# Patient Record
Sex: FEMALE | Race: WHITE | NOT HISPANIC OR LATINO | Employment: FULL TIME | URBAN - METROPOLITAN AREA
[De-identification: names, ages, dates, MRNs, and addresses within clinical notes are randomized per-mention and may not be internally consistent; named-entity substitution may affect disease eponyms.]

---

## 2017-02-19 ENCOUNTER — OFFICE VISIT - HEALTHEAST (OUTPATIENT)
Dept: FAMILY MEDICINE | Facility: CLINIC | Age: 37
End: 2017-02-19

## 2017-02-19 DIAGNOSIS — Z20.818 EXPOSURE TO STREP THROAT: ICD-10-CM

## 2017-03-17 ENCOUNTER — COMMUNICATION - HEALTHEAST (OUTPATIENT)
Dept: FAMILY MEDICINE | Facility: CLINIC | Age: 37
End: 2017-03-17

## 2017-03-30 ENCOUNTER — OFFICE VISIT - HEALTHEAST (OUTPATIENT)
Dept: FAMILY MEDICINE | Facility: CLINIC | Age: 37
End: 2017-03-30

## 2017-03-30 ENCOUNTER — AMBULATORY - HEALTHEAST (OUTPATIENT)
Dept: LAB | Facility: CLINIC | Age: 37
End: 2017-03-30

## 2017-03-30 DIAGNOSIS — Z13.220 SCREENING, LIPID: ICD-10-CM

## 2017-03-30 DIAGNOSIS — E78.5 HYPERLIPIDEMIA, UNSPECIFIED HYPERLIPIDEMIA TYPE: ICD-10-CM

## 2017-03-30 DIAGNOSIS — Z13.29 SCREENING FOR ENDOCRINE, NUTRITIONAL, METABOLIC AND IMMUNITY DISORDER: ICD-10-CM

## 2017-03-30 DIAGNOSIS — Z13.21 SCREENING FOR ENDOCRINE, NUTRITIONAL, METABOLIC AND IMMUNITY DISORDER: ICD-10-CM

## 2017-03-30 DIAGNOSIS — Z13.228 SCREENING FOR METABOLIC DISORDER: ICD-10-CM

## 2017-03-30 DIAGNOSIS — G89.29 OTHER CHRONIC PAIN: ICD-10-CM

## 2017-03-30 DIAGNOSIS — Z13.0 SCREENING FOR ENDOCRINE, NUTRITIONAL, METABOLIC AND IMMUNITY DISORDER: ICD-10-CM

## 2017-03-30 DIAGNOSIS — Z13.1 SCREENING FOR DIABETES MELLITUS: ICD-10-CM

## 2017-03-30 DIAGNOSIS — R63.5 WEIGHT GAIN: ICD-10-CM

## 2017-03-30 DIAGNOSIS — Z13.29 SCREENING FOR THYROID DISORDER: ICD-10-CM

## 2017-03-30 DIAGNOSIS — Z13.0 SCREENING, ANEMIA, DEFICIENCY, IRON: ICD-10-CM

## 2017-03-30 DIAGNOSIS — Z13.228 SCREENING FOR ENDOCRINE, NUTRITIONAL, METABOLIC AND IMMUNITY DISORDER: ICD-10-CM

## 2017-03-30 DIAGNOSIS — E55.9 VITAMIN D DEFICIENCY DISEASE: ICD-10-CM

## 2017-03-30 LAB
CHOLEST SERPL-MCNC: 216 MG/DL
FASTING STATUS PATIENT QL REPORTED: YES
HBA1C MFR BLD: 5.4 % (ref 3.5–6)
HDLC SERPL-MCNC: 63 MG/DL
LDLC SERPL CALC-MCNC: 137 MG/DL
TRIGL SERPL-MCNC: 82 MG/DL

## 2017-03-30 ASSESSMENT — MIFFLIN-ST. JEOR: SCORE: 1820.75

## 2017-03-30 NOTE — ASSESSMENT & PLAN NOTE
Diffuse muscle aches.  Weight reduction recommended.  She  is participating in the Weight Loss Program at [facility].

## 2017-03-30 NOTE — ASSESSMENT & PLAN NOTE
Vitamin d level is low. It is 22.6 but I would like to see it closer to 60.  I recommend you start taking (or increase current intake of) over the counter vitamin D3, 2000 IU daily.  We can recheck your vitamin D level in 3months or at your next visit.

## 2017-03-30 NOTE — ASSESSMENT & PLAN NOTE
Weight reduction recommended.  She  is participating in the Weight Loss Program at [facility].

## 2017-03-30 NOTE — ASSESSMENT & PLAN NOTE
Weight reduction recommended.  She  is participating in the Weight Loss Program at [facility].

## 2017-04-26 ENCOUNTER — OFFICE VISIT - HEALTHEAST (OUTPATIENT)
Dept: FAMILY MEDICINE | Facility: CLINIC | Age: 37
End: 2017-04-26

## 2017-04-26 DIAGNOSIS — Z20.818 EXPOSURE TO STREP THROAT: ICD-10-CM

## 2017-04-27 ENCOUNTER — COMMUNICATION - HEALTHEAST (OUTPATIENT)
Dept: FAMILY MEDICINE | Facility: CLINIC | Age: 37
End: 2017-04-27

## 2017-05-02 ENCOUNTER — OFFICE VISIT - HEALTHEAST (OUTPATIENT)
Dept: FAMILY MEDICINE | Facility: CLINIC | Age: 37
End: 2017-05-02

## 2017-05-02 DIAGNOSIS — E78.5 HYPERLIPIDEMIA: ICD-10-CM

## 2017-06-01 ENCOUNTER — OFFICE VISIT - HEALTHEAST (OUTPATIENT)
Dept: FAMILY MEDICINE | Facility: CLINIC | Age: 37
End: 2017-06-01

## 2017-06-01 DIAGNOSIS — E78.5 HYPERLIPIDEMIA: ICD-10-CM

## 2017-06-01 DIAGNOSIS — G89.29 CHRONIC PAIN: ICD-10-CM

## 2017-06-01 ASSESSMENT — MIFFLIN-ST. JEOR: SCORE: 1686.94

## 2017-07-12 ENCOUNTER — OFFICE VISIT - HEALTHEAST (OUTPATIENT)
Dept: FAMILY MEDICINE | Facility: CLINIC | Age: 37
End: 2017-07-12

## 2017-07-12 DIAGNOSIS — E78.5 HYPERLIPIDEMIA: ICD-10-CM

## 2017-07-12 DIAGNOSIS — G89.29 CHRONIC PAIN: ICD-10-CM

## 2017-07-12 ASSESSMENT — MIFFLIN-ST. JEOR: SCORE: 1635.91

## 2017-07-16 ENCOUNTER — OFFICE VISIT - HEALTHEAST (OUTPATIENT)
Dept: FAMILY MEDICINE | Facility: CLINIC | Age: 37
End: 2017-07-16

## 2017-07-16 DIAGNOSIS — R07.0 THROAT PAIN: ICD-10-CM

## 2017-08-22 ENCOUNTER — OFFICE VISIT - HEALTHEAST (OUTPATIENT)
Dept: FAMILY MEDICINE | Facility: CLINIC | Age: 37
End: 2017-08-22

## 2017-08-22 DIAGNOSIS — E78.5 HYPERLIPIDEMIA: ICD-10-CM

## 2017-08-22 DIAGNOSIS — G89.29 CHRONIC PAIN: ICD-10-CM

## 2017-09-26 ENCOUNTER — COMMUNICATION - HEALTHEAST (OUTPATIENT)
Dept: FAMILY MEDICINE | Facility: CLINIC | Age: 37
End: 2017-09-26

## 2017-10-02 ENCOUNTER — COMMUNICATION - HEALTHEAST (OUTPATIENT)
Dept: FAMILY MEDICINE | Facility: CLINIC | Age: 37
End: 2017-10-02

## 2017-10-12 ENCOUNTER — COMMUNICATION - HEALTHEAST (OUTPATIENT)
Dept: SCHEDULING | Facility: CLINIC | Age: 37
End: 2017-10-12

## 2017-11-07 ENCOUNTER — OFFICE VISIT - HEALTHEAST (OUTPATIENT)
Dept: FAMILY MEDICINE | Facility: CLINIC | Age: 37
End: 2017-11-07

## 2017-11-07 DIAGNOSIS — E78.5 HYPERLIPIDEMIA, UNSPECIFIED HYPERLIPIDEMIA TYPE: ICD-10-CM

## 2017-11-07 DIAGNOSIS — E55.9 VITAMIN D DEFICIENCY DISEASE: ICD-10-CM

## 2017-11-07 LAB
CHOLEST SERPL-MCNC: 224 MG/DL
FASTING STATUS PATIENT QL REPORTED: ABNORMAL
HDLC SERPL-MCNC: 85 MG/DL
LDLC SERPL CALC-MCNC: 129 MG/DL
TRIGL SERPL-MCNC: 50 MG/DL

## 2017-11-07 ASSESSMENT — MIFFLIN-ST. JEOR: SCORE: 1520.24

## 2017-11-07 NOTE — ASSESSMENT & PLAN NOTE
Lab Results   Component Value Date    CHOL 216 (H) 03/30/2017     Lab Results   Component Value Date    HDL  03/30/2017     Lab Results   Component Value Date    LDLCALC 137 (H) 03/30/2017     Lab Results   Component Value Date    TRIG 82 03/30/2017     Repeat lipids ordered today.    Addendum:  mychart result note sent with lipid results:  Overall these are improved.   triglicerides are better, hdl cholesterol is better and ldl cholesterol is better!      The total cholesterol is actually improved as well when considering your excellent HDL number (which is falsely elevating the total cholesterol)    These are excellent numbers.      All your hard work losing weight is paying off, keep upthe great work.

## 2017-11-07 NOTE — ASSESSMENT & PLAN NOTE
Vitamin D, Total (25-Hydroxy)   Date Value RefRange Status   03/30/2017 22.6 (L) 30.0 - 80.0 ng/mL Final      Will recheck today. Order placed.     Addendum:   Vitamin D, Total (25-Hydroxy)   Date Value Ref Range Status   11/07/2017 37.5 30.0 - 80.0 ng/mL Final

## 2017-12-08 ENCOUNTER — TRANSFERRED RECORDS (OUTPATIENT)
Dept: HEALTH INFORMATION MANAGEMENT | Facility: CLINIC | Age: 37
End: 2017-12-08

## 2017-12-12 ENCOUNTER — OFFICE VISIT - HEALTHEAST (OUTPATIENT)
Dept: FAMILY MEDICINE | Facility: CLINIC | Age: 37
End: 2017-12-12

## 2017-12-12 DIAGNOSIS — E55.9 VITAMIN D DEFICIENCY DISEASE: ICD-10-CM

## 2017-12-12 DIAGNOSIS — E78.5 HYPERLIPIDEMIA, UNSPECIFIED HYPERLIPIDEMIA TYPE: ICD-10-CM

## 2017-12-12 NOTE — ASSESSMENT & PLAN NOTE
We discussed that her vitamin D level has improved but that she could probably take up in a little more vitamin D daily and that would be beneficial.

## 2017-12-12 NOTE — ASSESSMENT & PLAN NOTE
She continues to lose weight.  We will continue the phentermine for now.    Initial Weight: 253.5 lbs  Weight: 185 lb 8 oz (84.1 kg)  Weight loss from initial: 68  % Weight loss: 26.82 %

## 2017-12-29 ENCOUNTER — RECORDS - HEALTHEAST (OUTPATIENT)
Dept: ADMINISTRATIVE | Facility: OTHER | Age: 37
End: 2017-12-29

## 2017-12-29 ENCOUNTER — HOSPITAL ENCOUNTER (OUTPATIENT)
Dept: PHYSICAL MEDICINE AND REHAB | Facility: CLINIC | Age: 37
Discharge: HOME | End: 2017-12-29
Attending: PHYSICAL MEDICINE & REHABILITATION

## 2017-12-29 DIAGNOSIS — M54.6 THORACIC SPINE PAIN: ICD-10-CM

## 2017-12-29 DIAGNOSIS — G56.03 BILATERAL CARPAL TUNNEL SYNDROME: ICD-10-CM

## 2017-12-29 DIAGNOSIS — M54.50 CHRONIC LOW BACK PAIN WITHOUT SCIATICA: ICD-10-CM

## 2017-12-29 DIAGNOSIS — R20.0 BILATERAL HAND NUMBNESS: ICD-10-CM

## 2017-12-29 DIAGNOSIS — M54.2 NECK PAIN: ICD-10-CM

## 2017-12-29 DIAGNOSIS — G89.29 CHRONIC LOW BACK PAIN WITHOUT SCIATICA: ICD-10-CM

## 2017-12-29 DIAGNOSIS — M79.18 MYOFASCIAL PAIN: ICD-10-CM

## 2017-12-29 DIAGNOSIS — M47.816 LUMBAR FACET JOINT SYNDROME: ICD-10-CM

## 2017-12-29 ASSESSMENT — MIFFLIN-ST. JEOR: SCORE: 1509.81

## 2018-01-08 ENCOUNTER — OFFICE VISIT - HEALTHEAST (OUTPATIENT)
Dept: PHYSICAL THERAPY | Facility: REHABILITATION | Age: 38
End: 2018-01-08

## 2018-01-08 DIAGNOSIS — G56.12 MEDIAN NERVE DYSFUNCTION, LEFT: ICD-10-CM

## 2018-01-08 DIAGNOSIS — M54.2 CERVICALGIA: ICD-10-CM

## 2018-01-08 DIAGNOSIS — G56.11 MEDIAN NERVE DYSFUNCTION, RIGHT: ICD-10-CM

## 2018-01-08 DIAGNOSIS — M25.639 DECREASED RANGE OF MOTION OF WRIST: ICD-10-CM

## 2018-01-08 DIAGNOSIS — R29.3 POOR POSTURE: ICD-10-CM

## 2018-01-12 ENCOUNTER — RECORDS - HEALTHEAST (OUTPATIENT)
Dept: RADIOLOGY | Facility: CLINIC | Age: 38
End: 2018-01-12

## 2018-01-15 ENCOUNTER — OFFICE VISIT - HEALTHEAST (OUTPATIENT)
Dept: PHYSICAL THERAPY | Facility: REHABILITATION | Age: 38
End: 2018-01-15

## 2018-01-15 DIAGNOSIS — G56.11 MEDIAN NERVE DYSFUNCTION, RIGHT: ICD-10-CM

## 2018-01-15 DIAGNOSIS — R29.3 POOR POSTURE: ICD-10-CM

## 2018-01-15 DIAGNOSIS — M25.639 DECREASED RANGE OF MOTION OF WRIST: ICD-10-CM

## 2018-01-15 DIAGNOSIS — G56.12 MEDIAN NERVE DYSFUNCTION, LEFT: ICD-10-CM

## 2018-01-15 DIAGNOSIS — M54.2 CERVICALGIA: ICD-10-CM

## 2018-01-17 ENCOUNTER — COMMUNICATION - HEALTHEAST (OUTPATIENT)
Dept: PHYSICAL MEDICINE AND REHAB | Facility: CLINIC | Age: 38
End: 2018-01-17

## 2018-01-19 ENCOUNTER — AMBULATORY - HEALTHEAST (OUTPATIENT)
Dept: NEUROSURGERY | Facility: CLINIC | Age: 38
End: 2018-01-19

## 2018-01-19 DIAGNOSIS — G93.5 CHIARI I MALFORMATION (H): ICD-10-CM

## 2018-01-24 ENCOUNTER — OFFICE VISIT - HEALTHEAST (OUTPATIENT)
Dept: FAMILY MEDICINE | Facility: CLINIC | Age: 38
End: 2018-01-24

## 2018-01-24 ENCOUNTER — AMBULATORY - HEALTHEAST (OUTPATIENT)
Dept: NEUROSURGERY | Facility: CLINIC | Age: 38
End: 2018-01-24

## 2018-01-24 ENCOUNTER — COMMUNICATION - HEALTHEAST (OUTPATIENT)
Dept: NEUROSURGERY | Facility: CLINIC | Age: 38
End: 2018-01-24

## 2018-01-24 ENCOUNTER — OFFICE VISIT - HEALTHEAST (OUTPATIENT)
Dept: PHYSICAL THERAPY | Facility: REHABILITATION | Age: 38
End: 2018-01-24

## 2018-01-24 DIAGNOSIS — M54.2 NECK PAIN: ICD-10-CM

## 2018-01-24 DIAGNOSIS — R29.3 POOR POSTURE: ICD-10-CM

## 2018-01-24 DIAGNOSIS — G56.11 MEDIAN NERVE DYSFUNCTION, RIGHT: ICD-10-CM

## 2018-01-24 DIAGNOSIS — G56.12 MEDIAN NERVE DYSFUNCTION, LEFT: ICD-10-CM

## 2018-01-24 DIAGNOSIS — M54.2 CERVICALGIA: ICD-10-CM

## 2018-01-24 DIAGNOSIS — M25.639 DECREASED RANGE OF MOTION OF WRIST: ICD-10-CM

## 2018-01-24 DIAGNOSIS — G89.29 OTHER CHRONIC PAIN: ICD-10-CM

## 2018-01-24 ASSESSMENT — MIFFLIN-ST. JEOR: SCORE: 1502.09

## 2018-01-24 NOTE — ASSESSMENT & PLAN NOTE
She tells me that she continues to have chronic pain and this has not really improved despite her dramatic weight loss.    Initial Weight: 253.5 lbs  Weight: 185 lb (83.9 kg)  Weight loss from initial: 68.5  % Weight loss: 27.02 %  Body Fat %: 40.7  Waist Circumference: 42 inches  Neck Circumference: 14.5 inches

## 2018-01-24 NOTE — ASSESSMENT & PLAN NOTE
She thinks she is not getting enough sleep so she will try to improve this. Could talk more about this at follow up as we spent majority of the time today discussinghow to wean phentermine.     Started weaning Phentermine 1/24/2018.  Taking 5/7d per week.     Her heaviest weight would have qualified her for weight loss surgery so I will keep that in mind if she is having difficulty with her weight at this point.    Plan follow up in 2 months.      We went over her in body today and compared it to July and March when she had previous and bodies done.  She was actually losing muscle mass as well as fat mass back in July but then started to implement exercise and since July she is actually gained 4 pounds of muscle.  She is congratulated on this achievement and encouraged to continue her current habits and exercise.  We did discuss that her waist circumference did not change a lot since July and that this could be due to persistent elevation of insulin levels.  We zoned and again on her sleep and that getting more sleep may be helpful in reducing cortisol and thus insulin levels.    C  A  P  Plan    C stands for control weight equal to or greater than 188 pounds.  If your daily weights are less than this your disease is in control.  Continue your present dosages of medicine and your current efforts at weight maintenance.    A stands for action -if weight is between 189 pounds and 203 pounds. This is a weight guideline that lets you know your disease is starting to slip out of control.  We encourage actions such as journaling food intake, daily weighing and other to get back into the control weight range.  At an action weight you do not increase your weight loss medicines.  You continue at your present dosages.  If you are not on medications you do not resume them unless you are unsuccessful and reach your panic weight.    P stands for PANIC if weight reaches 204 or above.  Your disease is out of control despite your  best efforts.  In this situation resume daily medicines and call the office for an appointment.

## 2018-01-26 ENCOUNTER — RECORDS - HEALTHEAST (OUTPATIENT)
Dept: ADMINISTRATIVE | Facility: OTHER | Age: 38
End: 2018-01-26

## 2018-01-29 ENCOUNTER — OFFICE VISIT - HEALTHEAST (OUTPATIENT)
Dept: PHYSICAL THERAPY | Facility: REHABILITATION | Age: 38
End: 2018-01-29

## 2018-01-29 DIAGNOSIS — M54.2 CERVICALGIA: ICD-10-CM

## 2018-01-29 DIAGNOSIS — R29.3 POOR POSTURE: ICD-10-CM

## 2018-01-29 DIAGNOSIS — M25.639 DECREASED RANGE OF MOTION OF WRIST: ICD-10-CM

## 2018-01-29 DIAGNOSIS — G56.12 MEDIAN NERVE DYSFUNCTION, LEFT: ICD-10-CM

## 2018-01-29 DIAGNOSIS — G56.11 MEDIAN NERVE DYSFUNCTION, RIGHT: ICD-10-CM

## 2018-01-31 ENCOUNTER — HOSPITAL ENCOUNTER (OUTPATIENT)
Dept: MRI IMAGING | Facility: CLINIC | Age: 38
Discharge: HOME | End: 2018-01-31
Attending: NEUROLOGICAL SURGERY

## 2018-01-31 DIAGNOSIS — G93.5 CHIARI I MALFORMATION (H): ICD-10-CM

## 2018-01-31 DIAGNOSIS — M54.2 NECK PAIN: ICD-10-CM

## 2018-02-01 ENCOUNTER — AMBULATORY - HEALTHEAST (OUTPATIENT)
Dept: NEUROSURGERY | Facility: CLINIC | Age: 38
End: 2018-02-01

## 2018-02-02 ENCOUNTER — OFFICE VISIT - HEALTHEAST (OUTPATIENT)
Dept: NEUROSURGERY | Facility: CLINIC | Age: 38
End: 2018-02-02

## 2018-02-02 DIAGNOSIS — G56.03 BILATERAL CARPAL TUNNEL SYNDROME: ICD-10-CM

## 2018-02-02 DIAGNOSIS — G93.5 CHIARI MALFORMATION TYPE I (H): ICD-10-CM

## 2018-02-06 ENCOUNTER — HOSPITAL ENCOUNTER (OUTPATIENT)
Dept: PHYSICAL MEDICINE AND REHAB | Facility: CLINIC | Age: 38
Discharge: HOME | End: 2018-02-06
Attending: NEUROLOGICAL SURGERY

## 2018-02-06 DIAGNOSIS — G56.03 BILATERAL CARPAL TUNNEL SYNDROME: ICD-10-CM

## 2018-02-09 ENCOUNTER — COMMUNICATION - HEALTHEAST (OUTPATIENT)
Dept: PHYSICAL THERAPY | Facility: REHABILITATION | Age: 38
End: 2018-02-09

## 2018-03-01 ENCOUNTER — AMBULATORY - HEALTHEAST (OUTPATIENT)
Dept: FAMILY MEDICINE | Facility: CLINIC | Age: 38
End: 2018-03-01

## 2018-03-15 ENCOUNTER — COMMUNICATION - HEALTHEAST (OUTPATIENT)
Dept: NEUROSURGERY | Facility: CLINIC | Age: 38
End: 2018-03-15

## 2018-03-21 ENCOUNTER — OFFICE VISIT - HEALTHEAST (OUTPATIENT)
Dept: FAMILY MEDICINE | Facility: CLINIC | Age: 38
End: 2018-03-21

## 2018-03-21 NOTE — ASSESSMENT & PLAN NOTE
Started weaning Phentermine 1/24/2018.  Taking 5/7dper week. Will continue that now (3/20/2018) as it seems to be working for her and she continues to lose weight.  She is congratulated today because her BMI has now dropped into the overweight range and she is no longer in the obese range     Her heaviest weight would have qualified her for weight loss surgery so I will keep that in mind if she is having difficulty with her weight at this point.     Plan follow up in 2 months.

## 2018-04-17 ENCOUNTER — COMMUNICATION - HEALTHEAST (OUTPATIENT)
Dept: NEUROSURGERY | Facility: CLINIC | Age: 38
End: 2018-04-17

## 2018-04-20 ENCOUNTER — OFFICE VISIT - HEALTHEAST (OUTPATIENT)
Dept: NEUROSURGERY | Facility: CLINIC | Age: 38
End: 2018-04-20

## 2018-04-20 DIAGNOSIS — G56.03 BILATERAL CARPAL TUNNEL SYNDROME: ICD-10-CM

## 2018-04-20 DIAGNOSIS — G93.5 CHIARI I MALFORMATION (H): ICD-10-CM

## 2018-05-02 ENCOUNTER — RECORDS - HEALTHEAST (OUTPATIENT)
Dept: ADMINISTRATIVE | Facility: OTHER | Age: 38
End: 2018-05-02

## 2018-05-03 ENCOUNTER — COMMUNICATION - HEALTHEAST (OUTPATIENT)
Dept: NEUROSURGERY | Facility: CLINIC | Age: 38
End: 2018-05-03

## 2018-05-04 ENCOUNTER — COMMUNICATION - HEALTHEAST (OUTPATIENT)
Dept: FAMILY MEDICINE | Facility: CLINIC | Age: 38
End: 2018-05-04

## 2018-05-07 ENCOUNTER — COMMUNICATION - HEALTHEAST (OUTPATIENT)
Dept: NEUROSURGERY | Facility: CLINIC | Age: 38
End: 2018-05-07

## 2018-05-07 ENCOUNTER — OFFICE VISIT - HEALTHEAST (OUTPATIENT)
Dept: FAMILY MEDICINE | Facility: CLINIC | Age: 38
End: 2018-05-07

## 2018-05-07 DIAGNOSIS — Z01.818 PREOP EXAMINATION: ICD-10-CM

## 2018-05-07 DIAGNOSIS — M25.532 BILATERAL WRIST PAIN: ICD-10-CM

## 2018-05-07 DIAGNOSIS — M25.531 BILATERAL WRIST PAIN: ICD-10-CM

## 2018-05-07 LAB
ALBUMIN UR-MCNC: NEGATIVE MG/DL
APPEARANCE UR: ABNORMAL
BILIRUB UR QL STRIP: NEGATIVE
COLOR UR AUTO: YELLOW
GLUCOSE UR STRIP-MCNC: NEGATIVE MG/DL
HCG UR QL: NEGATIVE
HGB BLD-MCNC: 12 G/DL (ref 12–16)
HGB UR QL STRIP: ABNORMAL
KETONES UR STRIP-MCNC: NEGATIVE MG/DL
LEUKOCYTE ESTERASE UR QL STRIP: NEGATIVE
NITRATE UR QL: NEGATIVE
PH UR STRIP: 6 [PH] (ref 5–8)
SP GR UR STRIP: 1.02 (ref 1–1.03)
SP GR UR STRIP: 1.02 (ref 1–1.03)
UROBILINOGEN UR STRIP-ACNC: ABNORMAL

## 2018-05-07 ASSESSMENT — MIFFLIN-ST. JEOR: SCORE: 1465.81

## 2018-05-08 ENCOUNTER — AMBULATORY - HEALTHEAST (OUTPATIENT)
Dept: FAMILY MEDICINE | Facility: CLINIC | Age: 38
End: 2018-05-08

## 2018-05-08 DIAGNOSIS — R82.90 ABNORMAL URINALYSIS: ICD-10-CM

## 2018-05-09 ENCOUNTER — COMMUNICATION - HEALTHEAST (OUTPATIENT)
Dept: FAMILY MEDICINE | Facility: CLINIC | Age: 38
End: 2018-05-09

## 2018-05-11 ENCOUNTER — ANESTHESIA - HEALTHEAST (OUTPATIENT)
Dept: SURGERY | Facility: CLINIC | Age: 38
End: 2018-05-11

## 2018-05-14 ENCOUNTER — SURGERY - HEALTHEAST (OUTPATIENT)
Dept: SURGERY | Facility: CLINIC | Age: 38
End: 2018-05-14

## 2018-05-14 ENCOUNTER — COMMUNICATION - HEALTHEAST (OUTPATIENT)
Dept: NEUROLOGY | Facility: CLINIC | Age: 38
End: 2018-05-14

## 2018-05-14 ENCOUNTER — COMMUNICATION - HEALTHEAST (OUTPATIENT)
Dept: NEUROSURGERY | Facility: CLINIC | Age: 38
End: 2018-05-14

## 2018-05-14 ASSESSMENT — MIFFLIN-ST. JEOR: SCORE: 1461.5

## 2018-05-22 ENCOUNTER — COMMUNICATION - HEALTHEAST (OUTPATIENT)
Dept: FAMILY MEDICINE | Facility: CLINIC | Age: 38
End: 2018-05-22

## 2018-05-22 DIAGNOSIS — M54.2 NECK PAIN: ICD-10-CM

## 2018-05-23 ENCOUNTER — OFFICE VISIT - HEALTHEAST (OUTPATIENT)
Dept: FAMILY MEDICINE | Facility: CLINIC | Age: 38
End: 2018-05-23

## 2018-05-23 DIAGNOSIS — F50.812 BINGE-EATING DISORDER, SEVERE: ICD-10-CM

## 2018-05-23 ASSESSMENT — MIFFLIN-ST. JEOR: SCORE: 1435.19

## 2018-05-23 NOTE — ASSESSMENT & PLAN NOTE
Dx: Initial BMI 42.18 with comorbid weight related conditions including chronic pain, hyperlipidemia, and vitamin D deficiency.  She also appears to have severe binge eating disorder.    Initial Weight: 253.5 lbs bmi 42.18, 3/30/2017  Weight: 172 lb (78 kg) bmi 29.52, 5/23/2018   Weight loss from initial: 81.5  % Weight loss: 32.15 %    Are you experiencing any side effects to the medications:  No  PHENTERMINE 37.5 MG PO Q DAY  3/30/2018 -1/24/2018 - 73 LB DOWN    PHENTERMINE 37.5MG PO Q DAY 5/7 D PER WEEK  1/24/2018 - 5/23/2018 - 8 LBS ADDITIONALLY DOWN (TOTAL 81.5 LB DOWN)  Starting to get difficult now, so will decrease dose 5/23/2018     PHENTERMINE 37.5 MG PO 3/7 D PER WEEK  5/23/2018 - Started.    vyvanse - discussed today 5/23/2018 - indication-  severe binge eating disorder.   She will research     Hunger control:  Good although she does eat larger quantities about once a week. She tells me today that she has had problems with what seems to be binge eating about once a day, and the phentermine has helped, she is worried that she will go back to that after getting off the phentermine.   Exercise was discussed: hit or miss. Kids sports taking over.  Taking supplements:  Taking fish oil, , mvi, vit d, she is not taking chromium.   Discussed journaling food:  no  Patient is pleased with the current results:  Yes.   The patient is following the nutrition plan:  Yes.   Barriers to losing weight:  Binge eating disorder - says she used to binge daily before phentermine.       PLAN  phentermine 3/7 d per week. follow up 2 months. consider vyvanse for severe binge eating disorder.

## 2018-05-23 NOTE — ASSESSMENT & PLAN NOTE
Currently weaning off phentermine.  We discussed Vyvanse.  She will go to the website for Vyvanse and consider this drug for herself.  We can talk about it at follow-up which is planned for 2 months from now.

## 2018-05-30 ENCOUNTER — AMBULATORY - HEALTHEAST (OUTPATIENT)
Dept: NEUROSURGERY | Facility: CLINIC | Age: 38
End: 2018-05-30

## 2018-06-12 ENCOUNTER — OFFICE VISIT - HEALTHEAST (OUTPATIENT)
Dept: NEUROSURGERY | Facility: CLINIC | Age: 38
End: 2018-06-12

## 2018-06-12 DIAGNOSIS — G56.01 CARPAL TUNNEL SYNDROME OF RIGHT WRIST: ICD-10-CM

## 2018-06-27 ENCOUNTER — COMMUNICATION - HEALTHEAST (OUTPATIENT)
Dept: NEUROSURGERY | Facility: CLINIC | Age: 38
End: 2018-06-27

## 2018-06-28 ENCOUNTER — HOSPITAL ENCOUNTER (OUTPATIENT)
Dept: PALLIATIVE MEDICINE | Facility: OTHER | Age: 38
Discharge: HOME | End: 2018-06-28
Attending: FAMILY MEDICINE

## 2018-06-28 DIAGNOSIS — G89.4 CHRONIC PAIN SYNDROME: ICD-10-CM

## 2018-07-06 ENCOUNTER — COMMUNICATION - HEALTHEAST (OUTPATIENT)
Dept: FAMILY MEDICINE | Facility: CLINIC | Age: 38
End: 2018-07-06

## 2018-07-10 ENCOUNTER — HOSPITAL ENCOUNTER (OUTPATIENT)
Dept: PALLIATIVE MEDICINE | Facility: OTHER | Age: 38
Discharge: HOME | End: 2018-07-10

## 2018-07-10 DIAGNOSIS — G89.4 CHRONIC PAIN SYNDROME: ICD-10-CM

## 2018-07-17 ENCOUNTER — HOSPITAL ENCOUNTER (OUTPATIENT)
Dept: PALLIATIVE MEDICINE | Facility: OTHER | Age: 38
Discharge: HOME | End: 2018-07-17

## 2018-07-17 DIAGNOSIS — G89.4 CHRONIC PAIN SYNDROME: ICD-10-CM

## 2018-07-18 ENCOUNTER — OFFICE VISIT - HEALTHEAST (OUTPATIENT)
Dept: FAMILY MEDICINE | Facility: CLINIC | Age: 38
End: 2018-07-18

## 2018-07-18 DIAGNOSIS — R20.0 HAND NUMBNESS: ICD-10-CM

## 2018-07-18 DIAGNOSIS — Z86.69 HISTORY OF CHIARI MALFORMATION: ICD-10-CM

## 2018-07-18 DIAGNOSIS — R00.2 HEART PALPITATIONS: ICD-10-CM

## 2018-07-24 ENCOUNTER — OFFICE VISIT - HEALTHEAST (OUTPATIENT)
Dept: FAMILY MEDICINE | Facility: CLINIC | Age: 38
End: 2018-07-24

## 2018-07-24 ENCOUNTER — COMMUNICATION - HEALTHEAST (OUTPATIENT)
Dept: FAMILY MEDICINE | Facility: CLINIC | Age: 38
End: 2018-07-24

## 2018-07-24 ASSESSMENT — MIFFLIN-ST. JEOR: SCORE: 1462.4

## 2018-07-24 NOTE — ASSESSMENT & PLAN NOTE
Dx: Initial BMI 42.18 with comorbid weight related conditions including chronic pain, hyperlipidemia, and vitamin D deficiency.  She also appears to have severe binge eating disorder.     Initial Weight: 253.5 lbs bmi 42.18, 3/30/2017  Weight: 172 lb (78 kg) bmi 29.52, 5/23/2018   Weight: 178 lb (80.7 kg) bmi 30.55 7/24/2018   Weight loss from initial: 75.5  % Weight loss: 29.78 %      Are you experiencing any side effects to the medications:  No  PHENTERMINE 37.5 MG PO Q DAY  3/30/2018 -1/24/2018 - 73 LB DOWN     PHENTERMINE 37.5MG PO Q DAY 5/7 D PER WEEK  1/24/2018 - 5/23/2018 - 8 LBS ADDITIONALLY DOWN (TOTAL 81.5 LB DOWN)  Startingto get difficult now, so will decrease dose 5/23/2018      PHENTERMINE 37.5 MG PO 3/7 D PER WEEK  5/23/2018 - 7/24/2018 - gained 6 lbs.   Dc phentermine 7/24/2018      CONTRAVE 2 PILLS two times a day (START SLOWLY)  7/24/2018 - START     vyvanse - discussed today 5/23/2018 - indication-  severe binge eating disorder.   She will research      Hunger control:  Good although she does eat larger quantities about once a week. She tells me today that she has had problems with what seems to be binge eating about once a day, and the phentermine has helped, she is worried that she will go back to that after getting off the phentermine.   Exercise was discussed: hit or miss. Kids sports taking over.  Taking supplements:  Taking fish oil, , mvi, vit d, she is not taking chromium.   Discussed journaling food:  no  Patient is pleased with the current results:  Yes. But scared to regain weight.   The patient is following the nutrition plan:  Yes.   Barriers to losing weight:  Binge eating disorder - says she used to binge daily before phentermine.         PLAN  Dc phentermine  Consider vyvanse in the future for binge eating disorder. I did not want to start vyvanse today because I worry that it will not be as effective as she is just coming off phentermine (another stimulant to which she appears to  have developed some tolerance.  Start contrave. We did discuss risks and benefits.     Follow up in 1-3 months (she is told that we would like to see weight loss of 5% by 3 months - down to 170 lbs)

## 2018-07-25 ENCOUNTER — COMMUNICATION - HEALTHEAST (OUTPATIENT)
Dept: FAMILY MEDICINE | Facility: CLINIC | Age: 38
End: 2018-07-25

## 2018-07-26 ENCOUNTER — COMMUNICATION - HEALTHEAST (OUTPATIENT)
Dept: FAMILY MEDICINE | Facility: CLINIC | Age: 38
End: 2018-07-26

## 2018-07-26 ENCOUNTER — HOSPITAL ENCOUNTER (OUTPATIENT)
Dept: PALLIATIVE MEDICINE | Facility: OTHER | Age: 38
Discharge: HOME | End: 2018-07-26

## 2018-07-26 DIAGNOSIS — G89.4 CHRONIC PAIN SYNDROME: ICD-10-CM

## 2018-07-30 ENCOUNTER — OFFICE VISIT (OUTPATIENT)
Dept: NEUROSURGERY | Facility: CLINIC | Age: 38
End: 2018-07-30
Attending: NEUROLOGICAL SURGERY
Payer: COMMERCIAL

## 2018-07-30 VITALS
SYSTOLIC BLOOD PRESSURE: 124 MMHG | TEMPERATURE: 97.6 F | OXYGEN SATURATION: 100 % | DIASTOLIC BLOOD PRESSURE: 91 MMHG | HEART RATE: 82 BPM

## 2018-07-30 DIAGNOSIS — G93.5 COMPRESSION OF BRAIN (H): Primary | ICD-10-CM

## 2018-07-30 PROCEDURE — 99203 OFFICE O/P NEW LOW 30 MIN: CPT | Performed by: NEUROLOGICAL SURGERY

## 2018-07-30 PROCEDURE — G0463 HOSPITAL OUTPT CLINIC VISIT: HCPCS

## 2018-07-30 ASSESSMENT — PAIN SCALES - GENERAL: PAINLEVEL: MILD PAIN (2)

## 2018-07-30 NOTE — LETTER
7/30/2018         RE: Lia Lemon  [address]  [place] [address]        Dear Colleague,    Thank you for referring your patient, Lia Lemon, to the [facility]. Please see a copy of my visit note below.    38F w/ HAs, neck pain, bilateral hand numbness/weakness, Chiari malformation.  Several months of aching headaches, neck pain radiating to the bilateral shoulders, numbness, weakness, paresthesias in the hands.  Worsened since a chiropractic neck manipulation.  EMG had shown carpal tunnel, and she recently underwent right carpal tunnel release with Dr. Hennessy.  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       History reviewed. No pertinent past medical history.  History reviewed. No pertinent surgical history.  Social History     Social History     Marital status:      Spouse name: N/A     Number of children: N/A     Years of education: N/A     Occupational History     Not on file.     Social History Main Topics     Smoking status: Never Smoker     Smokeless tobacco: Never Used      Comment: never smoked     Alcohol use Not on file     Drug use: Not on file     Sexual activity: Not on file     Other Topics Concern     Not on file     Social History Narrative     No narrative on file     History reviewed. No pertinent family history.     ROS: 10 point ROS neg other than the symptoms noted above in the HPI.    Physical Exam  BP (!) 124/91 (BP Location: Right arm, Cuff Size: Adult Regular)  Pulse 82  Temp 97.6  F (36.4  C) (Oral)  LMP 07/21/2018 (Exact Date)  SpO2 100%  Breastfeeding? No  HEENT:  Normocephalic, atraumatic.  PERRLA.  EOM s intact.  Visual fields full to gross exam  Neck:  Supple, non-tender, without lymphadenopathy.  Heart:  No peripheral edema  Lungs:  No SOB  Abdomen:  Non-distended.   Skin:  Warm and dry.  Extremities:  No edema, cyanosis or  clubbing.  Psychiatric:  No apparent distress  Musculoskeletal:  Normal bulk and tone    NEUROLOGICAL EXAMINATION:     Mental status:  Alert and Oriented x 3, speech is fluent.  Cranial nerves:  II-XII intact.   Motor:    Shoulder Abduction:  Right:  5/5   Left:  5/5  Biceps:                      Right:  5/5   Left:  5/5  Triceps:                     Right:  5/5   Left:  5/5  Wrist Extensors:       Right:  5/5   Left:  5/5  Wrist Flexors:           Right:  5/5   Left:  5/5  interosseus :            Right:  5/5   Left:  5/5   Hip Flexor:                Right: 5/5  Left:  5/5  Hip Adductor:             Right:  5/5  Left:  5/5  Hip Abductor:             Right:  5/5  Left:  5/5  Gastroc Soleus:        Right:  5/5  Left:  5/5  Tib/Ant:                      Right:  5/5  Left:  5/5  EHL:                     Right:  5/5  Left:  5/5  Sensation:  Intact  Reflexes:  Negative Babinski.  Negative Clonus.  Negative Le's.  Coordination:  Smooth finger to nose testing.   Negative pronator drift.  Smooth tandem walking.    A/P:  38F w/ HAs, neck pain, bilateral hand numbness/weakness, Chiari malformation    I had a discussion with the patient, reviewing the history, symptoms, and imaging  We discussed the risk/benefit profile of Chiari decompression  Discussed that without a syrinx or myelopathy on exam, it is reasonable to pursue observation at this point given that she is fairly lightly symptomatic    Again, thank you for allowing me to participate in the care of your patient.        Sincerely,        Pavel Patel MD

## 2018-07-30 NOTE — NURSING NOTE
Lia Lemon is a 38 year old female who presents for:  Chief Complaint   Patient presents with     Neurologic Problem     referral from Dr. ELVIA Kenney, follow up [facility]        Vitals:    There were no vitals filed for this visit.    BMI:  There is no height or weight on file to calculate BMI.    Pain Score:  Data Unavailable        Mariana Ferro CMA, AAS

## 2018-07-30 NOTE — PROGRESS NOTES
38F w/ HAs, neck pain, bilateral hand numbness/weakness, Chiari malformation.  Several months of aching headaches, neck pain radiating to the bilateral shoulders, numbness, weakness, paresthesias in the hands.  Worsened since a chiropractic neck manipulation.  EMG had shown carpal tunnel, and she recently underwent right carpal tunnel release with Dr. Hennessy.  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       History reviewed. No pertinent past medical history.  History reviewed. No pertinent surgical history.  Social History     Social History     Marital status:      Spouse name: N/A     Number of children: N/A     Years of education: N/A     Occupational History     Not on file.     Social History Main Topics     Smoking status: Never Smoker     Smokeless tobacco: Never Used      Comment: never smoked     Alcohol use Not on file     Drug use: Not on file     Sexual activity: Not on file     Other Topics Concern     Not on file     Social History Narrative     No narrative on file     History reviewed. No pertinent family history.     ROS: 10 point ROS neg other than the symptoms noted above in the HPI.    Physical Exam  BP (!) 124/91 (BP Location: Right arm, Cuff Size: Adult Regular)  Pulse 82  Temp 97.6  F (36.4  C) (Oral)  LMP 07/21/2018 (Exact Date)  SpO2 100%  Breastfeeding? No  HEENT:  Normocephalic, atraumatic.  PERRLA.  EOM s intact.  Visual fields full to gross exam  Neck:  Supple, non-tender, without lymphadenopathy.  Heart:  No peripheral edema  Lungs:  No SOB  Abdomen:  Non-distended.   Skin:  Warm and dry.  Extremities:  No edema, cyanosis or clubbing.  Psychiatric:  No apparent distress  Musculoskeletal:  Normal bulk and tone    NEUROLOGICAL EXAMINATION:     Mental status:  Alert and Oriented x 3, speech is fluent.  Cranial nerves:  II-XII intact.   Motor:    Shoulder Abduction:  Right:  5/5   Left:  5/5  Biceps:                       Right:  5/5   Left:  5/5  Triceps:                     Right:  5/5   Left:  5/5  Wrist Extensors:       Right:  5/5   Left:  5/5  Wrist Flexors:           Right:  5/5   Left:  5/5  interosseus :            Right:  5/5   Left:  5/5   Hip Flexor:                Right: 5/5  Left:  5/5  Hip Adductor:             Right:  5/5  Left:  5/5  Hip Abductor:             Right:  5/5  Left:  5/5  Gastroc Soleus:        Right:  5/5  Left:  5/5  Tib/Ant:                      Right:  5/5  Left:  5/5  EHL:                     Right:  5/5  Left:  5/5  Sensation:  Intact  Reflexes:  Negative Babinski.  Negative Clonus.  Negative Le's.  Coordination:  Smooth finger to nose testing.   Negative pronator drift.  Smooth tandem walking.    A/P:  38F w/ HAs, neck pain, bilateral hand numbness/weakness, Chiari malformation    I had a discussion with the patient, reviewing the history, symptoms, and imaging  We discussed the risk/benefit profile of Chiari decompression  Discussed that without a syrinx or myelopathy on exam, it is reasonable to pursue observation at this point given that she is fairly lightly symptomatic

## 2018-07-31 ENCOUNTER — COMMUNICATION - HEALTHEAST (OUTPATIENT)
Dept: FAMILY MEDICINE | Facility: CLINIC | Age: 38
End: 2018-07-31

## 2018-07-31 ENCOUNTER — HOSPITAL ENCOUNTER (OUTPATIENT)
Dept: PALLIATIVE MEDICINE | Facility: OTHER | Age: 38
Discharge: HOME | End: 2018-07-31

## 2018-07-31 DIAGNOSIS — G89.4 CHRONIC PAIN SYNDROME: ICD-10-CM

## 2018-07-31 DIAGNOSIS — M54.2 NECK PAIN: ICD-10-CM

## 2018-08-06 ENCOUNTER — HEALTH MAINTENANCE LETTER (OUTPATIENT)
Age: 38
End: 2018-08-06

## 2018-08-07 ENCOUNTER — HOSPITAL ENCOUNTER (OUTPATIENT)
Dept: PALLIATIVE MEDICINE | Facility: OTHER | Age: 38
Discharge: HOME | End: 2018-08-07

## 2018-08-07 ENCOUNTER — RECORDS - HEALTHEAST (OUTPATIENT)
Dept: ADMINISTRATIVE | Facility: OTHER | Age: 38
End: 2018-08-07

## 2018-08-07 DIAGNOSIS — M54.2 NECK PAIN: ICD-10-CM

## 2018-08-07 DIAGNOSIS — G89.4 CHRONIC PAIN SYNDROME: ICD-10-CM

## 2018-11-08 ENCOUNTER — RECORDS - HEALTHEAST (OUTPATIENT)
Dept: LAB | Facility: CLINIC | Age: 38
End: 2018-11-08

## 2018-11-10 LAB — BACTERIA SPEC CULT: NORMAL

## 2018-11-12 ENCOUNTER — COMMUNICATION - HEALTHEAST (OUTPATIENT)
Dept: FAMILY MEDICINE | Facility: CLINIC | Age: 38
End: 2018-11-12

## 2018-12-13 ENCOUNTER — OFFICE VISIT - HEALTHEAST (OUTPATIENT)
Dept: FAMILY MEDICINE | Facility: CLINIC | Age: 38
End: 2018-12-13

## 2018-12-13 DIAGNOSIS — E66.812 CLASS 2 OBESITY WITH ALVEOLAR HYPOVENTILATION, SERIOUS COMORBIDITY, AND BODY MASS INDEX (BMI) OF 35.0 TO 35.9 IN ADULT (H): ICD-10-CM

## 2018-12-13 DIAGNOSIS — E66.2 CLASS 2 OBESITY WITH ALVEOLAR HYPOVENTILATION, SERIOUS COMORBIDITY, AND BODY MASS INDEX (BMI) OF 35.0 TO 35.9 IN ADULT (H): ICD-10-CM

## 2018-12-13 DIAGNOSIS — F50.812 BINGE-EATING DISORDER, SEVERE: ICD-10-CM

## 2018-12-13 ASSESSMENT — MIFFLIN-ST. JEOR: SCORE: 1580.34

## 2018-12-13 NOTE — ASSESSMENT & PLAN NOTE
Dx: Initial BMI 42.18 with comorbid weight related conditions including chronic pain, hyperlipidemia, and vitamin D deficiency.  She also appears to have severe binge eating disorder.     Initial Weight: 253.5 lbs bmi 42.18, 3/30/2017  Weight: 172 lb (78 kg) bmi 29.52, 5/23/2018   Weight: 178 lb (80.7 kg) bmi 30.55 7/24/2018   Weight: 204 lb (92.5 kg) Body mass index is 35.02 kg/m . 12/13/2018   Weight loss from initial: 49.5  % Weight loss: 19.53 %    PHENTERMINE 37.5 MG PO Q DAY  3/30/2018 -1/24/2018 - 73 LB DOWN     PHENTERMINE 37.5MG PO Q DAY 5/7 D PER WEEK  1/24/2018 - 5/23/2018 - 8 LBS ADDITIONALLY DOWN (TOTAL 81.5 LB DOWN)  Starting to get difficult now, so will decrease dose 5/23/2018      PHENTERMINE 37.5 MG PO 3/7 D PER WEEK  5/23/2018 - 7/24/2018 - gained 6 lbs.   Dc phentermine 7/24/2018       CONTRAVE 2 PILLS two times a day (START SLOWLY)  7/24/2018 - - SELF DISCONTINUED.       30 MG orally per morning-  indication-  severe binge eating disorder.   12/13/2018 - START       PLAN  REGAINING WEIGHT - (BMI down from 42-35 even with weight regain.)    CAP PLAN - need to discuss in more detail at follow up.   Less than or equal to 206 lbs- control  Between 207 and 209lbs - action  Greater than or equal to 210 - panic     Start Vyvanse 30 mg orally per day.  Notes she is no longer taking Contrave-she discontinued that on her own.    Follow up in 1 MONTH

## 2018-12-14 ENCOUNTER — COMMUNICATION - HEALTHEAST (OUTPATIENT)
Dept: FAMILY MEDICINE | Facility: CLINIC | Age: 38
End: 2018-12-14

## 2019-01-09 ENCOUNTER — HOSPITAL ENCOUNTER (OUTPATIENT)
Dept: PALLIATIVE MEDICINE | Facility: OTHER | Age: 39
Discharge: HOME | End: 2019-01-09

## 2019-01-09 DIAGNOSIS — G89.4 CHRONIC PAIN SYNDROME: ICD-10-CM

## 2019-01-09 DIAGNOSIS — M54.2 NECK PAIN: ICD-10-CM

## 2019-01-11 ENCOUNTER — OFFICE VISIT - HEALTHEAST (OUTPATIENT)
Dept: NEUROSURGERY | Facility: CLINIC | Age: 39
End: 2019-01-11

## 2019-01-11 DIAGNOSIS — I49.8 ATRIAL ARRHYTHMIA: ICD-10-CM

## 2019-01-11 DIAGNOSIS — G56.03 BILATERAL CARPAL TUNNEL SYNDROME: ICD-10-CM

## 2019-01-11 DIAGNOSIS — G93.5 CHIARI I MALFORMATION (H): ICD-10-CM

## 2019-01-15 ENCOUNTER — HOSPITAL ENCOUNTER (OUTPATIENT)
Dept: PALLIATIVE MEDICINE | Facility: OTHER | Age: 39
Discharge: HOME | End: 2019-01-15

## 2019-01-15 DIAGNOSIS — G89.4 CHRONIC PAIN SYNDROME: ICD-10-CM

## 2019-01-16 ENCOUNTER — OFFICE VISIT - HEALTHEAST (OUTPATIENT)
Dept: FAMILY MEDICINE | Facility: CLINIC | Age: 39
End: 2019-01-16

## 2019-01-16 DIAGNOSIS — Z13.1 SCREENING FOR DIABETES MELLITUS: ICD-10-CM

## 2019-01-16 DIAGNOSIS — E66.2 CLASS 1 OBESITY WITH ALVEOLAR HYPOVENTILATION, SERIOUS COMORBIDITY, AND BODY MASS INDEX (BMI) OF 34.0 TO 34.9 IN ADULT (H): ICD-10-CM

## 2019-01-16 DIAGNOSIS — E78.5 HYPERLIPIDEMIA, UNSPECIFIED HYPERLIPIDEMIA TYPE: ICD-10-CM

## 2019-01-16 DIAGNOSIS — Z13.0 SCREENING, ANEMIA, DEFICIENCY, IRON: ICD-10-CM

## 2019-01-16 DIAGNOSIS — E55.9 VITAMIN D DEFICIENCY DISEASE: ICD-10-CM

## 2019-01-16 DIAGNOSIS — E66.811 CLASS 1 OBESITY WITH ALVEOLAR HYPOVENTILATION, SERIOUS COMORBIDITY, AND BODY MASS INDEX (BMI) OF 34.0 TO 34.9 IN ADULT (H): ICD-10-CM

## 2019-01-16 DIAGNOSIS — Z13.228 ENCOUNTER FOR SCREENING FOR METABOLIC DISORDER: ICD-10-CM

## 2019-01-16 DIAGNOSIS — Z13.29 SCREENING FOR THYROID DISORDER: ICD-10-CM

## 2019-01-16 DIAGNOSIS — R00.2 PALPITATIONS: ICD-10-CM

## 2019-01-16 DIAGNOSIS — F50.812 BINGE-EATING DISORDER, SEVERE: ICD-10-CM

## 2019-01-16 LAB
ALBUMIN SERPL-MCNC: 4 G/DL (ref 3.5–5)
ALP SERPL-CCNC: 87 U/L (ref 45–120)
ALT SERPL W P-5'-P-CCNC: <9 U/L (ref 0–45)
ANION GAP SERPL CALCULATED.3IONS-SCNC: 12 MMOL/L (ref 5–18)
AST SERPL W P-5'-P-CCNC: 22 U/L (ref 0–40)
BASOPHILS # BLD AUTO: 0 THOU/UL (ref 0–0.2)
BASOPHILS NFR BLD AUTO: 0 % (ref 0–2)
BILIRUB SERPL-MCNC: 0.6 MG/DL (ref 0–1)
BUN SERPL-MCNC: 9 MG/DL (ref 8–22)
CALCIUM SERPL-MCNC: 9.3 MG/DL (ref 8.5–10.5)
CHLORIDE BLD-SCNC: 105 MMOL/L (ref 98–107)
CHOLEST SERPL-MCNC: 219 MG/DL
CO2 SERPL-SCNC: 22 MMOL/L (ref 22–31)
CREAT SERPL-MCNC: 0.71 MG/DL (ref 0.6–1.1)
EOSINOPHIL # BLD AUTO: 0.1 THOU/UL (ref 0–0.4)
EOSINOPHIL NFR BLD AUTO: 2 % (ref 0–6)
ERYTHROCYTE [DISTWIDTH] IN BLOOD BY AUTOMATED COUNT: 11.2 % (ref 11–14.5)
FASTING STATUS PATIENT QL REPORTED: YES
GFR SERPL CREATININE-BSD FRML MDRD: >60 ML/MIN/1.73M2
GLUCOSE BLD-MCNC: 75 MG/DL (ref 70–125)
HBA1C MFR BLD: 5.1 % (ref 3.5–6)
HCT VFR BLD AUTO: 43.2 % (ref 35–47)
HDLC SERPL-MCNC: 97 MG/DL
HGB BLD-MCNC: 14.3 G/DL (ref 12–16)
LDLC SERPL CALC-MCNC: 111 MG/DL
LYMPHOCYTES # BLD AUTO: 1.3 THOU/UL (ref 0.8–4.4)
LYMPHOCYTES NFR BLD AUTO: 30 % (ref 20–40)
MCH RBC QN AUTO: 31.1 PG (ref 27–34)
MCHC RBC AUTO-ENTMCNC: 33.1 G/DL (ref 32–36)
MCV RBC AUTO: 94 FL (ref 80–100)
MONOCYTES # BLD AUTO: 0.4 THOU/UL (ref 0–0.9)
MONOCYTES NFR BLD AUTO: 10 % (ref 2–10)
NEUTROPHILS # BLD AUTO: 2.5 THOU/UL (ref 2–7.7)
NEUTROPHILS NFR BLD AUTO: 58 % (ref 50–70)
PLATELET # BLD AUTO: 264 THOU/UL (ref 140–440)
PMV BLD AUTO: 7.6 FL (ref 7–10)
POTASSIUM BLD-SCNC: 4.6 MMOL/L (ref 3.5–5)
PROT SERPL-MCNC: 7.2 G/DL (ref 6–8)
RBC # BLD AUTO: 4.6 MILL/UL (ref 3.8–5.4)
SODIUM SERPL-SCNC: 139 MMOL/L (ref 136–145)
TRIGL SERPL-MCNC: 54 MG/DL
TSH SERPL DL<=0.005 MIU/L-ACNC: 1.74 UIU/ML (ref 0.3–5)
WBC: 4.3 THOU/UL (ref 4–11)

## 2019-01-16 ASSESSMENT — MIFFLIN-ST. JEOR: SCORE: 1566.73

## 2019-01-16 NOTE — ASSESSMENT & PLAN NOTE
Vyvanse 30 mg orally every morning discontinued-dose increased  Vyvanse 30 mg p.o. twice daily started today.    Follow-up in 1 month-see BMI and problem list for more details.

## 2019-01-16 NOTE — ASSESSMENT & PLAN NOTE
Dx: Initial BMI 42.18 with comorbid weight related conditions including chronic pain, hyperlipidemia, and vitamin D deficiency.  She also appears to have severe binge eating disorder.     Initial Weight: 253.5 lbs bmi 42.18, 3/30/2017  Weight: 172 lb (78 kg) bmi 29.52, 5/23/2018   Weight: 178 lb (80.7 kg) bmi 30.55 7/24/2018   Weight: 204 lb (92.5 kg) Body mass index is 35.02 kg/m . 12/13/2018   Weight: 201 lb (91.2 kg) bmi 34.50, 1/16/2019   Weight loss from initial: 52.5  % Weight loss: 20.71 %     PHENTERMINE 37.5 MG PO Q DAY  3/30/2018 -1/24/2018 - 73 LB DOWN     PHENTERMINE 37.5MG PO Q DAY 5/7 D PER WEEK  1/24/2018 - 5/23/2018 - 8 LBS ADDITIONALLY DOWN (TOTAL 81.5 LB DOWN)  Starting to get difficult now, so will decrease dose 5/23/2018      PHENTERMINE 37.5 MG PO 3/7 D PER WEEK  5/23/2018 - 7/24/2018 - gained 6 lbs.   Dc phentermine 7/24/2018       CONTRAVE 2 PILLS two times a day (START SLOWLY)  7/24/2018 - - SELF DISCONTINUED.       Vyvanse 30 MG orally per morning-  indication-  severe binge eating disorder.   12/13/2018 - 1/16/2019- she finds it helpful.   1/16/2019 INCREASED DOSE    VYVANSE 30 MG PO two times a day  1/16/2019 - INCREASED DOSE FROM 30 MG PO Q DAY.  [facility].    LIRAGLUTIDE -  1/16/2019 - DISCUSSED.        PLAN  REGAINING WEIGHT - (BMI down from 42-34 even with weight regain.)     CAP PLAN -  Less than or equal to 202 lbs- control  Between 203 and 205 lbs - action  Greater than or equal to 206 - panic      She found the vyvanse helpful but wears off quickly and not strong enough.   Increased vyvanse dose.   START VYVANSE 30 MG PO two times a day (INCREASED)    Goal this month: start to get back on the treadmill.  And increase vyvanse. Watch pacs.     Labs: Given her recent difficulties with maintaining her weight loss I would like to check thyroid, CBC, vitamin D, lipids, A1c and CMP as she is due anyway since it has been over a year since we have checked these.     Follow up in 1 MONTH

## 2019-01-17 LAB
25(OH)D3 SERPL-MCNC: 22.9 NG/ML (ref 30–80)
25(OH)D3 SERPL-MCNC: 22.9 NG/ML (ref 30–80)

## 2019-01-18 ENCOUNTER — RECORDS - HEALTHEAST (OUTPATIENT)
Dept: ADMINISTRATIVE | Facility: OTHER | Age: 39
End: 2019-01-18

## 2019-01-18 ENCOUNTER — HOSPITAL ENCOUNTER (OUTPATIENT)
Dept: PHYSICAL MEDICINE AND REHAB | Facility: CLINIC | Age: 39
Discharge: HOME | End: 2019-01-18
Attending: NEUROLOGICAL SURGERY

## 2019-01-18 ENCOUNTER — COMMUNICATION - HEALTHEAST (OUTPATIENT)
Dept: FAMILY MEDICINE | Facility: CLINIC | Age: 39
End: 2019-01-18

## 2019-01-18 DIAGNOSIS — G56.03 BILATERAL CARPAL TUNNEL SYNDROME: ICD-10-CM

## 2019-01-18 LAB — HPV_EXT - HISTORICAL: NORMAL

## 2019-01-22 ENCOUNTER — COMMUNICATION - HEALTHEAST (OUTPATIENT)
Dept: FAMILY MEDICINE | Facility: CLINIC | Age: 39
End: 2019-01-22

## 2019-01-22 ENCOUNTER — HOSPITAL ENCOUNTER (OUTPATIENT)
Dept: MRI IMAGING | Facility: CLINIC | Age: 39
Discharge: HOME | End: 2019-01-22
Attending: NEUROLOGICAL SURGERY

## 2019-01-22 DIAGNOSIS — G93.5 CHIARI I MALFORMATION (H): ICD-10-CM

## 2019-01-31 ENCOUNTER — HOSPITAL ENCOUNTER (OUTPATIENT)
Dept: PALLIATIVE MEDICINE | Facility: OTHER | Age: 39
Discharge: HOME | End: 2019-01-31

## 2019-01-31 DIAGNOSIS — G89.4 CHRONIC PAIN SYNDROME: ICD-10-CM

## 2019-02-01 ENCOUNTER — AMBULATORY - HEALTHEAST (OUTPATIENT)
Dept: FAMILY MEDICINE | Facility: CLINIC | Age: 39
End: 2019-02-01

## 2019-02-01 ENCOUNTER — OFFICE VISIT - HEALTHEAST (OUTPATIENT)
Dept: FAMILY MEDICINE | Facility: CLINIC | Age: 39
End: 2019-02-01

## 2019-02-01 DIAGNOSIS — M89.8X1 PAIN OF RIGHT SCAPULA: ICD-10-CM

## 2019-02-01 DIAGNOSIS — R03.0 ELEVATED BLOOD PRESSURE READING WITHOUT DIAGNOSIS OF HYPERTENSION: ICD-10-CM

## 2019-02-10 ENCOUNTER — COMMUNICATION - HEALTHEAST (OUTPATIENT)
Dept: FAMILY MEDICINE | Facility: CLINIC | Age: 39
End: 2019-02-10

## 2019-02-11 ENCOUNTER — AMBULATORY - HEALTHEAST (OUTPATIENT)
Dept: FAMILY MEDICINE | Facility: CLINIC | Age: 39
End: 2019-02-11

## 2019-02-11 DIAGNOSIS — B37.31 YEAST INFECTION OF THE VAGINA: ICD-10-CM

## 2019-02-12 ENCOUNTER — VIRTUAL VISIT (OUTPATIENT)
Dept: FAMILY MEDICINE | Facility: OTHER | Age: 39
End: 2019-02-12

## 2019-02-12 NOTE — PROGRESS NOTES
"Date:   Clinician: Octavia Phillips  Clinician NPI: [id number]  Patient: Lia Lemon  Patient : 1980  Patient Address: [address] [place] [address]  Patient Phone: [phone]  Visit Protocol: Ear pain  Patient Summary:  Lia is a 38 year old ( : 1980 ) female who initiated a Visit for swimmer's ear (ear pain). When asked the question \"Please sign me up to receive news, health information and promotions from [employer].\", Lia responded \"No\".    Lia reports that her ear pain started yesterday. The ear pain is located inside both ears.   In addition to the ear pain, Lia is experiencing a feeling of fullness and tenderness in the ear(s). Her ear(s) is tender to the touch on the ear canal.   Symptom Details   Pain: Lia is experiencing moderate pain (4-6 on a 10 point pain scale). It gets worse when she eats or chews and gently pulls on the earlobe(s).    Lia denies redness and itchiness in the ear(s). She also denies the possibility of a foreign object in the ear(s), ever having ear tubes, recent injuries near the ear(s), having fluid draining from the ear(s), and feeling feverish.   Lia reports swimming and flying within the past week.   Weight: 110 lbs   She denies pregnancy and denies breastfeeding. She has menstruated in the past month.   She does not smoke or use smokeless tobacco.   Additional health information pertinent to this Visit as reported by the patient (free text): My son is visiting his grandparents in [place] and has swimmers ear from the beach and pool. Vj Lemon 2007.  He is 110 pounds.  He will be flying in 48 hours and I would like for him to get drops ASAP to prevent issues with his flight.  [facility] - [id number], [facility] [address].   MEDICATIONS: No current medications, ALLERGIES: NKDA  Clinician Response:  Dear Lia,   I am sorry you are not feeling well. To determine the most appropriate " care for you, I would like you to be seen in person to further discuss your health history and symptoms.  You will not be charged for this Visit. Thank you for trusting us with your care.   Diagnosis: Refer for additional evaluation  Diagnosis ICD: R69  Diagnosis ICD: 462.0

## 2019-02-14 ENCOUNTER — OFFICE VISIT - HEALTHEAST (OUTPATIENT)
Dept: FAMILY MEDICINE | Facility: CLINIC | Age: 39
End: 2019-02-14

## 2019-02-14 DIAGNOSIS — E66.2 CLASS 1 OBESITY WITH ALVEOLAR HYPOVENTILATION, SERIOUS COMORBIDITY, AND BODY MASS INDEX (BMI) OF 34.0 TO 34.9 IN ADULT (H): ICD-10-CM

## 2019-02-14 DIAGNOSIS — E55.9 VITAMIN D DEFICIENCY DISEASE: ICD-10-CM

## 2019-02-14 DIAGNOSIS — F50.812 BINGE-EATING DISORDER, SEVERE: ICD-10-CM

## 2019-02-14 DIAGNOSIS — E66.811 CLASS 1 OBESITY WITH ALVEOLAR HYPOVENTILATION, SERIOUS COMORBIDITY, AND BODY MASS INDEX (BMI) OF 34.0 TO 34.9 IN ADULT (H): ICD-10-CM

## 2019-02-14 ASSESSMENT — MIFFLIN-ST. JEOR: SCORE: 1565.37

## 2019-02-14 NOTE — ASSESSMENT & PLAN NOTE
Dx: Initial BMI 42.18 with comorbid weight related conditions including chronic pain, hyperlipidemia, and vitamin D deficiency.  She also appears to have severe binge eating disorder.     Initial Weight: 253.5 lbs bmi 42.18, 3/30/2017  Weight: 172 lb (78 kg) bmi 29.52, 5/23/2018   Weight: 178 lb (80.7 kg) bmi 30.55 7/24/2018   Weight: 204 lb (92.5 kg) Body mass index is 35.02 kg/m . 12/13/2018   Weight: 201 lb (91.2 kg) bmi 34.50, 1/16/2019   Weight: 200 lb 11.2 oz (91 kg) bmi 34.45, 2/14/2019   Weight loss from initial: 52.8  % Weight loss: 20.83 %       PHENTERMINE 37.5 MG PO Q DAY  3/30/2018 -1/24/2018 - 73 LB DOWN     PHENTERMINE 37.5MG PO Q DAY 5/7 D PER WEEK  1/24/2018 - 5/23/2018 - 8 LBS ADDITIONALLY DOWN (TOTAL 81.5 LB DOWN)  Starting to get difficult now, so will decrease dose 5/23/2018      PHENTERMINE 37.5 MG PO 3/7 D PER WEEK  5/23/2018 - 7/24/2018 - gained 6 lbs.  Dc phentermine 7/24/2018       CONTRAVE 2 PILLS two times a day (START SLOWLY)  7/24/2018 - - SELF DISCONTINUED.       Vyvanse 30 MG orally per morning-  indication-  severe binge eating disorder.   12/13/2018 - 1/16/2019 - she finds it helpful.   1/16/2019 INCREASED DOSE     VYVANSE 30 MG PO two times a day  1/16/2019 - INCREASED DOSE FROM 30 MG PO Q DAY.  Watch pvcs.     LIRAGLUTIDE -  1/16/2019 - DISCUSSED.        PLAN  WEIGHT MAINTENANCE PHASE - (BMI down from 42-34)     CAP PLAN   Less than or equal to 202 lbs- control  Between 203 and 205 lbs - action  Greater than or equal to 206 - panic      She found the vyvanse helpful but wears off quickly and not strong enough.   Increased vyvanse dose.  CONTINUE VYVANSE 30 MG PO two times a day (INCREASED)     Goal this month: going to try and increase veggies in diet and research recipes for sauces.      Labs: 1/16/19-normal CBC and A1c is 5.1/improved, d 22.9, tsh nl, cmp nl, lipids normal (hdl high)    Vit d is 22 - recommend taking 5000 iu daily.      Follow up in 1 MONTH

## 2019-03-27 ENCOUNTER — OFFICE VISIT - HEALTHEAST (OUTPATIENT)
Dept: FAMILY MEDICINE | Facility: CLINIC | Age: 39
End: 2019-03-27

## 2019-03-27 DIAGNOSIS — R03.0 ELEVATED BLOOD PRESSURE READING WITHOUT DIAGNOSIS OF HYPERTENSION: ICD-10-CM

## 2019-03-27 DIAGNOSIS — M89.8X1 PAIN OF RIGHT SCAPULA: ICD-10-CM

## 2019-03-27 DIAGNOSIS — M54.6 ACUTE MIDLINE THORACIC BACK PAIN: ICD-10-CM

## 2019-03-27 DIAGNOSIS — G56.01 CARPAL TUNNEL SYNDROME OF RIGHT WRIST: ICD-10-CM

## 2019-04-05 ENCOUNTER — HOSPITAL ENCOUNTER (OUTPATIENT)
Dept: PHYSICAL MEDICINE AND REHAB | Facility: CLINIC | Age: 39
Discharge: HOME | End: 2019-04-05
Attending: PHYSICAL MEDICINE & REHABILITATION

## 2019-04-05 ENCOUNTER — HOSPITAL ENCOUNTER (OUTPATIENT)
Dept: RADIOLOGY | Facility: HOSPITAL | Age: 39
Discharge: HOME | End: 2019-04-05
Attending: PHYSICAL MEDICINE & REHABILITATION

## 2019-04-05 DIAGNOSIS — M54.2 NECK PAIN: ICD-10-CM

## 2019-04-05 DIAGNOSIS — M79.18 MYOFASCIAL PAIN: ICD-10-CM

## 2019-04-05 DIAGNOSIS — G44.86 HEADACHE, CERVICOGENIC: ICD-10-CM

## 2019-04-05 DIAGNOSIS — R20.0 BILATERAL HAND NUMBNESS: ICD-10-CM

## 2019-04-09 ENCOUNTER — OFFICE VISIT - HEALTHEAST (OUTPATIENT)
Dept: FAMILY MEDICINE | Facility: CLINIC | Age: 39
End: 2019-04-09

## 2019-04-09 ENCOUNTER — COMMUNICATION - HEALTHEAST (OUTPATIENT)
Dept: PHYSICAL MEDICINE AND REHAB | Facility: CLINIC | Age: 39
End: 2019-04-09

## 2019-04-09 DIAGNOSIS — R03.0 ELEVATED BLOOD PRESSURE READING WITHOUT DIAGNOSIS OF HYPERTENSION: ICD-10-CM

## 2019-04-09 DIAGNOSIS — E66.812 CLASS 2 OBESITY WITH ALVEOLAR HYPOVENTILATION, SERIOUS COMORBIDITY, AND BODY MASS INDEX (BMI) OF 35.0 TO 35.9 IN ADULT (H): ICD-10-CM

## 2019-04-09 DIAGNOSIS — E66.2 CLASS 2 OBESITY WITH ALVEOLAR HYPOVENTILATION, SERIOUS COMORBIDITY, AND BODY MASS INDEX (BMI) OF 35.0 TO 35.9 IN ADULT (H): ICD-10-CM

## 2019-04-09 ASSESSMENT — MIFFLIN-ST. JEOR: SCORE: 1591.68

## 2019-04-09 NOTE — ASSESSMENT & PLAN NOTE
Dx: Initial BMI 42.18 with comorbid weight related conditions including chronic pain, hyperlipidemia, and vitamin D deficiency.  She also appears to have severe binge eating disorder.     Initial Weight: 253.5 lbs bmi 42.18, 3/30/2017  Weight: 172 lb (78 kg) bmi 29.52, 5/23/2018   Weight: 178 lb (80.7 kg) bmi 30.55 7/24/2018   Weight: 204 lb (92.5 kg) Body mass index is 35.02 kg/m . 12/13/2018   Weight: 201 lb (91.2 kg) bmi 34.50, 1/16/2019   Weight: 200 lb 11.2 oz (91 kg) bmi 34.45, 2/14/2019   Weight: 206 lb 8 oz (93.7 kg) , bmi 35.45, 4/9/2019   Weight lossfrom initial: 47  % Weight loss: 18.54 %    PHENTERMINE 37.5 MG PO Q DAY  3/30/2018 -1/24/2018 - 73 LB DOWN     PHENTERMINE 37.5MG PO Q DAY 5/7 D PER WEEK  1/24/2018 - 5/23/2018 - 8 LBS ADDITIONALLY DOWN (TOTAL 81.5 LB DOWN)  Starting to get difficult now, so will decrease dose 5/23/2018      PHENTERMINE 37.5 MG PO 3/7 D PER WEEK  5/23/2018 - 7/24/2018 - gained 6 lbs.   Dc phentermine 7/24/2018       CONTRAVE 2 PILLS two times a day (START SLOWLY)  7/24/2018 - - SELF DISCONTINUED.       Vyvanse 30 MG orally per morning-  indication-  severe binge eating disorder.   12/13/2018 - 1/16/2019 - she finds it helpful.   1/16/2019 INCREASED DOSE     VYVANSE 30 MG PO two times a day  1/16/2019 - INCREASED DOSE FROM 30 MG PO Q DAY.  Watch pvcs.     LIRAGLUTIDE -  1/16/2019 - DISCUSSED.        PLAN  WEIGHT MAINTENANCE PHASE - (BMI down from 42-34, but now starting to regain and bmi is up to 35)     CAP PLAN   Less than or equal to 208 lbs- control  Between 210 and 212 lbs - action  Greater than or equal to 213 - panic      vyvanse discontinued today due to elevated blood pressure.  I did suggest an antihypertensive medication to help with her blood pressure so that she can remain on a stimulant but she declined that.    Discussed saxenda and belviq, she declined both now but wants to look them up.     Given that her initial BMI was 42 and she has comorbidities including  hyperlipidemia she may be a candidate for weight loss surgery.  I did offer up the option of watching a video learning more about weight loss surgery but she declined.     Goal this month: monitor bp. Discontinue all stimulants due to elevated bp.      Labs: 1/16/19-normal CBC and A1c is 5.1/improved, d 22.9, tsh nl, cmp nl, lipids normal (hdl high)     Vit d is 22 - recommend taking 5000 iu daily.      Followup in 2-4 weeks to recheck bp and weight.

## 2019-04-09 NOTE — ASSESSMENT & PLAN NOTE
BP Readings from Last 3 Encounters:    (!) 182/110   04/05/19 142/87   03/27/19 (!) 128/98     vyvanse discontinued.   Follow up in 2-4 weeks to recheck bp.

## 2019-05-02 ENCOUNTER — OFFICE VISIT - HEALTHEAST (OUTPATIENT)
Dept: PHYSICAL THERAPY | Facility: CLINIC | Age: 39
End: 2019-05-02

## 2019-05-02 DIAGNOSIS — M79.18 MYOFASCIAL PAIN: ICD-10-CM

## 2019-05-02 DIAGNOSIS — M54.2 CERVICALGIA: ICD-10-CM

## 2019-05-02 DIAGNOSIS — G89.29 CHRONIC SCAPULAR PAIN: ICD-10-CM

## 2019-05-02 DIAGNOSIS — M89.8X1 CHRONIC SCAPULAR PAIN: ICD-10-CM

## 2019-05-02 DIAGNOSIS — R29.3 POOR POSTURE: ICD-10-CM

## 2019-05-07 ENCOUNTER — OFFICE VISIT - HEALTHEAST (OUTPATIENT)
Dept: PHYSICAL THERAPY | Facility: CLINIC | Age: 39
End: 2019-05-07

## 2019-05-07 DIAGNOSIS — R29.3 POOR POSTURE: ICD-10-CM

## 2019-05-07 DIAGNOSIS — M79.18 MYOFASCIAL PAIN: ICD-10-CM

## 2019-05-07 DIAGNOSIS — G89.29 CHRONIC SCAPULAR PAIN: ICD-10-CM

## 2019-05-07 DIAGNOSIS — M89.8X1 CHRONIC SCAPULAR PAIN: ICD-10-CM

## 2019-05-07 DIAGNOSIS — M54.2 CERVICALGIA: ICD-10-CM

## 2019-05-09 ENCOUNTER — OFFICE VISIT - HEALTHEAST (OUTPATIENT)
Dept: PHYSICAL THERAPY | Facility: CLINIC | Age: 39
End: 2019-05-09

## 2019-05-09 DIAGNOSIS — M54.2 CERVICALGIA: ICD-10-CM

## 2019-05-09 DIAGNOSIS — G89.29 CHRONIC SCAPULAR PAIN: ICD-10-CM

## 2019-05-09 DIAGNOSIS — M89.8X1 CHRONIC SCAPULAR PAIN: ICD-10-CM

## 2019-05-09 DIAGNOSIS — M79.18 MYOFASCIAL PAIN: ICD-10-CM

## 2019-05-09 DIAGNOSIS — R29.3 POOR POSTURE: ICD-10-CM

## 2019-05-16 ENCOUNTER — OFFICE VISIT - HEALTHEAST (OUTPATIENT)
Dept: PHYSICAL THERAPY | Facility: CLINIC | Age: 39
End: 2019-05-16

## 2019-05-16 DIAGNOSIS — M54.2 CERVICALGIA: ICD-10-CM

## 2019-05-16 DIAGNOSIS — M79.18 MYOFASCIAL PAIN: ICD-10-CM

## 2019-05-16 DIAGNOSIS — G89.29 CHRONIC SCAPULAR PAIN: ICD-10-CM

## 2019-05-16 DIAGNOSIS — M89.8X1 CHRONIC SCAPULAR PAIN: ICD-10-CM

## 2019-05-16 DIAGNOSIS — R29.3 POOR POSTURE: ICD-10-CM

## 2019-05-23 ENCOUNTER — OFFICE VISIT - HEALTHEAST (OUTPATIENT)
Dept: PHYSICAL THERAPY | Facility: CLINIC | Age: 39
End: 2019-05-23

## 2019-05-23 DIAGNOSIS — M89.8X1 CHRONIC SCAPULAR PAIN: ICD-10-CM

## 2019-05-23 DIAGNOSIS — M79.18 MYOFASCIAL PAIN: ICD-10-CM

## 2019-05-23 DIAGNOSIS — R29.3 POOR POSTURE: ICD-10-CM

## 2019-05-23 DIAGNOSIS — M54.2 CERVICALGIA: ICD-10-CM

## 2019-05-23 DIAGNOSIS — G89.29 CHRONIC SCAPULAR PAIN: ICD-10-CM

## 2019-06-27 ENCOUNTER — OFFICE VISIT - HEALTHEAST (OUTPATIENT)
Dept: FAMILY MEDICINE | Facility: CLINIC | Age: 39
End: 2019-06-27

## 2019-06-27 DIAGNOSIS — I10 ESSENTIAL HYPERTENSION: ICD-10-CM

## 2019-06-27 ASSESSMENT — MIFFLIN-ST. JEOR: SCORE: 1589.41

## 2019-07-09 ENCOUNTER — OFFICE VISIT - HEALTHEAST (OUTPATIENT)
Dept: FAMILY MEDICINE | Facility: CLINIC | Age: 39
End: 2019-07-09

## 2019-07-09 DIAGNOSIS — E66.2 CLASS 2 OBESITY WITH ALVEOLAR HYPOVENTILATION, SERIOUS COMORBIDITY, AND BODY MASS INDEX (BMI) OF 36.0 TO 36.9 IN ADULT (H): ICD-10-CM

## 2019-07-09 DIAGNOSIS — E66.812 CLASS 2 OBESITY WITH ALVEOLAR HYPOVENTILATION, SERIOUS COMORBIDITY, AND BODY MASS INDEX (BMI) OF 36.0 TO 36.9 IN ADULT (H): ICD-10-CM

## 2019-07-09 ASSESSMENT — MIFFLIN-ST. JEOR: SCORE: 1612.54

## 2019-07-09 NOTE — ASSESSMENT & PLAN NOTE
Dx: Initial BMI 42.18 with comorbid weight related conditions including chronic pain, hyperlipidemia, and vitamin D deficiency.  also appears to have severe binge eating disorder.     Initial Weight: 253.5 lbs bmi 42.18, 3/30/2017  Weight: 172 lb (78 kg) bmi 29.52, 5/23/2018   Weight: 178 lb (80.7 kg) bmi 30.55 7/24/2018   Weight: 204 lb (92.5 kg) Body mass index is 35.02 kg/m . 12/13/2018   Weight: 201 lb (91.2 kg) bmi 34.50, 1/16/2019   Weight: 200 lb 11.2 oz (91 kg) bmi 34.45, 2/14/2019   Weight: 206 lb 8 oz (93.7 kg) , bmi 35.45, 4/9/2019   Weight: 211 lb 1.6 oz (95.8 kg), bmi 36.24, 7/9/2019   Weight loss from initial: 42.4  % Weight loss: 16.73 %       PHENTERMINE 37.5 MG PO Q DAY  3/30/2018 -1/24/2018 - 73 LB DOWN     PHENTERMINE 37.5MG PO Q DAY 5/7 D PER WEEK  1/24/2018 - 5/23/2018 - 8 LBS ADDITIONALLY DOWN (TOTAL 81.5 LB DOWN)  Starting to get difficult now, so will decrease dose 5/23/2018      PHENTERMINE 37.5 MG PO 3/7 D PER WEEK  5/23/2018 - 7/24/2018 - gained 6 lbs.   Dc phentermine 7/24/2018       CONTRAVE 2 PILLS two times a day (START SLOWLY)  7/24/2018 - - SELF DISCONTINUED.       Vyvanse 30 MG orally per morning-  indication-  severe binge eating disorder.   12/13/2018 - 1/16/2019 - she finds it helpful.   1/16/2019 INCREASED DOSE     VYVANSE 30 MG PO two times a day  1/16/2019 - INCREASED DOSE FROM 30 MG PO Q DAY.  Watch pvcs.     LIRAGLUTIDE -  1/16/2019 - DISCUSSED.        PLAN  WEIGHT MAINTENANCE PHASE - (BMI down from 42-34, but now starting to regain and bmi is up to 36)     CAP PLAN   Less than or equal to 213 lbs- control  Between 214-216 lbs - action  Greater than or equal to 217 - panic      She is frustrated today with weight regain.  She would like to restart stimulant medication.  Phentermine worked for her in the past and she wants to restart that.   Discussed saxenda and belviq, she declined both now and although I had recommended she look these up she has not done that yet.  At this  point she would like to get her blood pressure under control and restart phentermine.  Her blood pressure was still elevated today at 138/90 and so we will wait to see if the hydrochlorothiazide 12.5 mg dose that she started a week ago starts to kick in.  She will schedule a nurse visit and if her blood pressure is improved we can restart phentermine.     Given that her initial BMI was 42 and she has comorbidities including hyperlipidemia she may be a candidate for weight loss surgery.  I did offer up the option of watching a video learning more about weight loss surgery but she declined.     Goal this month: monitor bp.  Get blood pressure under control and consider restarting phentermine.     Labs: 1/16/19-normal CBC and A1c is 5.1/improved, d 22.9, tsh nl, cmp nl, lipids normal (hdl high)     Vit d is 22 - recommend taking 5000 iu daily.      Follow up in 2-4 weeks to recheck bp and weight.

## 2019-07-16 ENCOUNTER — AMBULATORY - HEALTHEAST (OUTPATIENT)
Dept: NURSING | Facility: CLINIC | Age: 39
End: 2019-07-16

## 2019-07-16 DIAGNOSIS — R03.0 ELEVATED BLOOD PRESSURE READING WITHOUT DIAGNOSIS OF HYPERTENSION: ICD-10-CM

## 2019-07-23 ENCOUNTER — OFFICE VISIT - HEALTHEAST (OUTPATIENT)
Dept: FAMILY MEDICINE | Facility: CLINIC | Age: 39
End: 2019-07-23

## 2019-07-23 DIAGNOSIS — I10 ESSENTIAL HYPERTENSION: ICD-10-CM

## 2019-07-23 DIAGNOSIS — E66.812 CLASS 2 OBESITY WITH ALVEOLAR HYPOVENTILATION, SERIOUS COMORBIDITY, AND BODY MASS INDEX (BMI) OF 35.0 TO 35.9 IN ADULT (H): ICD-10-CM

## 2019-07-23 DIAGNOSIS — R03.0 ELEVATED BLOOD PRESSURE READING WITHOUT DIAGNOSIS OF HYPERTENSION: ICD-10-CM

## 2019-07-23 DIAGNOSIS — Z51.81 ENCOUNTER FOR THERAPEUTIC DRUG MONITORING: ICD-10-CM

## 2019-07-23 DIAGNOSIS — E66.2 CLASS 2 OBESITY WITH ALVEOLAR HYPOVENTILATION, SERIOUS COMORBIDITY, AND BODY MASS INDEX (BMI) OF 35.0 TO 35.9 IN ADULT (H): ICD-10-CM

## 2019-07-23 ASSESSMENT — MIFFLIN-ST. JEOR: SCORE: 1595.76

## 2019-07-23 NOTE — ASSESSMENT & PLAN NOTE
BP Readings from Last 3 Encounters:   07/23/19 (!) 138/94   07/16/19 130/82   07/09/19 138/90     She says she is taking hctz 12.5mg po q day but bp still high today.   Of note she says she took her blood pressure at home this morning it was 108/82.  She is never had her home blood pressure cuff calibrated but she plans to bring it in next time to have that done.  She also had a diet Mountain Dew today so possibly that is elevating her blood pressure at this time.  She is really wanting to get back on the phentermine but because of the elevated blood pressure I do not feel comfortable at this time.  She says she is never getting low blood pressures and does not feel hypotensive or lightheaded at any time.  For this reason I did offer to increase her hydrochlorothiazide.  We will start hydrochlorthiazide 25 mrem orally per day.    Follow-up in 2 weeks for blood pressure check and medication titration and consideration of starting phentermine.    Continue weight loss efforts.

## 2019-07-23 NOTE — ASSESSMENT & PLAN NOTE
Dx: Initial BMI 42.18 with comorbid weight related conditions including chronic pain, hyperlipidemia, and vitamin D deficiency.  She also appears to have severe binge eating disorder.     Initial Weight: 253.5 lbs bmi 42.18, 3/30/2017  Weight: 172 lb (78 kg) bmi 29.52, 5/23/2018   Weight: 178 lb (80.7 kg) bmi 30.55 7/24/2018   Weight: 204 lb (92.5 kg) Body mass index is 35.02 kg/m . 12/13/2018   Weight: 201 lb (91.2 kg) bmi 34.50, 1/16/2019   Weight: 200 lb 11.2 oz (91 kg) bmi 34.45, 2/14/2019   Weight: 206 lb 8 oz (93.7 kg) , bmi 35.45, 4/9/2019   Weight: 211 lb 1.6 oz (95.8 kg), bmi 36.24, 7/9/2019   Weight: 207 lb 6.4 oz (94.1 kg), bmi 35.60, 7/23/2019   Weight loss from initial: 46.1  % Weight loss: 18.19 %     PHENTERMINE 37.5 MG PO Q DAY  3/30/2018 -1/24/2018 - 73 LB DOWN     PHENTERMINE 37.5MG PO Q DAY 5/7 D PER WEEK  1/24/2018 - 5/23/2018 - 8 LBS ADDITIONALLY DOWN (TOTAL 81.5 LB DOWN)  Starting to getdifficult now, so will decrease dose 5/23/2018      PHENTERMINE 37.5 MG PO 3/7 D PER WEEK  5/23/2018 - 7/24/2018 - gained 6 lbs.   Dc phentermine 7/24/2018       CONTRAVE 2 PILLS two times a day (START SLOWLY)  7/24/2018 - - SELF DISCONTINUED.       Vyvanse 30 MG orally per morning-  indication-  severe binge eating disorder.   12/13/2018 - 1/16/2019 - she finds it helpful.   1/16/2019 INCREASED DOSE     VYVANSE 30 MG PO two times a day  1/16/2019 - INCREASED DOSE FROM 30 MG PO Q DAY.  Watch pvcs.     LIRAGLUTIDE -  1/16/2019 - DISCUSSED.        PLAN  WEIGHT MAINTENANCE PHASE - (BMI down from 42-34, but now starting to regain and bmi is up to 35)     CAP PLAN   Less than or equal to 209lbs- control  Between 210-211 lbs - action  Greater than or equal to 212 - panic      She is frustrated today with weight regain.  She would like to restart stimulant medication.  Phentermine worked for her in the past and she wants to restart that.  She is not interested in Saxenda or Belviq.  Her blood pressure was still  elevated today at 132/98 despite taking hctz 12.5mg po q day x 3 weeks.      Given that her initial BMI was 42 and she has comorbidities including hyperlipidemia she may be a candidate for weight loss surgery.  I did offer up the option of watching a video learning more about weight loss surgery but she declined.     Goal this month: Increase from 12.5 to hydrochlorothiazide 25 mg orally per day, bring in blood pressure cuff next time to calibrate, consider restarting phentermine once her blood pressure is controlled.  We do need a BMP at follow-up.     Labs: 1/16/19-normal CBC and A1c is 5.1/improved, d 22.9, tsh nl, cmp nl, lipids normal (hdl high)     Vit d is 22 - recommend taking 5000 iu daily.      Follow up in 2-4 weeks to recheck bp and weight.

## 2019-08-09 ENCOUNTER — OFFICE VISIT - HEALTHEAST (OUTPATIENT)
Dept: FAMILY MEDICINE | Facility: CLINIC | Age: 39
End: 2019-08-09

## 2019-08-09 DIAGNOSIS — I10 ESSENTIAL HYPERTENSION: ICD-10-CM

## 2019-08-09 DIAGNOSIS — E78.5 HYPERLIPIDEMIA, UNSPECIFIED HYPERLIPIDEMIA TYPE: ICD-10-CM

## 2019-08-09 DIAGNOSIS — E55.9 VITAMIN D DEFICIENCY DISEASE: ICD-10-CM

## 2019-08-09 DIAGNOSIS — E66.2 CLASS 2 OBESITY WITH ALVEOLAR HYPOVENTILATION, SERIOUS COMORBIDITY, AND BODY MASS INDEX (BMI) OF 35.0 TO 35.9 IN ADULT (H): ICD-10-CM

## 2019-08-09 DIAGNOSIS — G89.29 OTHER CHRONIC PAIN: ICD-10-CM

## 2019-08-09 DIAGNOSIS — M54.6 ACUTE MIDLINE THORACIC BACK PAIN: ICD-10-CM

## 2019-08-09 DIAGNOSIS — E66.812 CLASS 2 OBESITY WITH ALVEOLAR HYPOVENTILATION, SERIOUS COMORBIDITY, AND BODY MASS INDEX (BMI) OF 35.0 TO 35.9 IN ADULT (H): ICD-10-CM

## 2019-08-09 ASSESSMENT — MIFFLIN-ST. JEOR: SCORE: 1584.87

## 2019-08-09 NOTE — ASSESSMENT & PLAN NOTE
BP Readings from Last 3 Encounters:   08/09/19 (!) 144/94   07/23/19 (!) 132/98   07/16/19 130/82   WORSENING despite weight loss and continued attempts at weight loss.   Her blood pressure continues to be an issue and seems to be rising despite the addition of hctz 12.5 on 6/27/19 and increased to 25 mg on 7/23/19.     For weight loss for now I am avoiding stimulants and have suggested adding another medication, we discussed lisinopril today or followup with PCP. She wants to make appt w/ PCP and I think this is a good idea. I have recommended she also bring in her home bp cuff to calibrate because she reports home bp is always normal.  She is dealing with a lot of back and neck pain and not sleeping well because of it and her stress levels are increased. All of this could be contributing to elevated bp and to increased weight.

## 2019-08-09 NOTE — ASSESSMENT & PLAN NOTE
Worsening - clearly having a lot of discomfort today, constantly moving and trying to get comfortable. Recommend joyce w PCP. She continues to maintain a 45+ lb weight loss since 2017 and wants to lose more

## 2019-08-09 NOTE — ASSESSMENT & PLAN NOTE
Vit D was 22 in past and will recheck now as she says she is taking her supplement.   Order has been placed.

## 2019-08-09 NOTE — ASSESSMENT & PLAN NOTE
Dx: Initial BMI 42.18 with comorbid weight related conditions including chronic pain, hyperlipidemia, and vitamin D deficiency.  She also appears to have severe binge eating disorder.     Initial Weight: 253.5 lbs bmi 42.18, 3/30/2017  Weight: 172 lb (78 kg) bmi 29.52, 5/23/2018   Weight: 178 lb (80.7 kg) bmi 30.55 7/24/2018   Weight: 204 lb (92.5 kg) Body mass index is 35.02 kg/m . 12/13/2018   Weight: 201 lb (91.2 kg) bmi 34.50, 1/16/2019   Weight: 200 lb 11.2 oz (91 kg) bmi 34.45, 2/14/2019   Weight: 206 lb 8 oz (93.7 kg) ,bmi 35.45, 4/9/2019   Weight: 211 lb 1.6 oz (95.8 kg), bmi 36.24, 7/9/2019   Weight: 207 lb 6.4 oz (94.1 kg), bmi 35.60, 7/23/2019   Weight: 205 lb (93 kg), bmi 35.19, 8/11/2019   Weight loss from initial: 48.5  % Weight loss: 19.13 %       PHENTERMINE 37.5 MG PO Q DAY  3/30/2018 -1/24/2018 - 73 LB DOWN     PHENTERMINE 37.5MG PO Q DAY 5/7 D PER WEEK  1/24/2018 - 5/23/2018 - 8 LBS ADDITIONALLY DOWN (TOTAL 81.5 LB DOWN)  Dose decreased 5/2018    PHENTERMINE 37.5 MG PO 3/7 D PER WEEK  5/23/2018 - 7/24/2018 - gained 6 lbs.   Dc phentermine 7/24/2018       2 PILLS two times a day (START SLOWLY)  7/24/2018 - - SELF DISCONTINUED.     NALOXONE  8/9/19 - start naloxone (plan to add wellbutrin if desired in future to simulate contrave with less cost).      Vyvanse 30 MG orally per morning-  indication-  severe binge eating disorder.   12/13/2018 - 1/16/2019 - she finds it helpful.   1/16/2019 INCREASED DOSE     VYVANSE 30 MG PO two times a day  1/16/2019 - INCREASED DOSE FROM 30 MG PO Q DAY.  Watch pvcs.     LIRAGLUTIDE -  1/16/2019 - DISCUSSED.        PLAN  WEIGHT MAINTENANCE PHASE - (BMI down from 42-34, but now starting to regain and bmi is up to 35).  CURRENTLY NOT INTERESTED IN 24 WEEK PROGRAM AND NOT INTERESTED IN BARIATRIC SURGERY.     CAP PLAN   Less than or equal to 209lbs- control  Between 210-211 lbs - action  Greater than or equal to 212 - panic      Start naloxone    She is frustrated today  with weight and difficulty losing more.  She would like to restart stimulant medication.  Phentermine worked for her in the past and she wants to restart that.  She is not interested in Saxenda or Belviq.  Her blood pressure is making it difficult to restart stimulant as it seems to keep going up despite taking hctz 25 mg po q day. See htn in prob list.      Given that her initial BMI was 42 and she has comorbidities including hyperlipidemia she may be a candidate for weight loss surgery.  I did offer up the option of watching a video in the past, to learn more about weight loss surgery but she declined.     Goal this month: see pcp to work on chronic back pain and htn. Start naloxone (didn't like contrave due to cost) so could try generic naloxone andadd wellbutrin as needed for compulsive eating)     Labs: 1/16/19-normal CBC and A1c is 5.1/improved, d 22.9, tsh nl, cmp nl, lipids normal (hdl high)     Vit d is 22 - recommend taking 5000 iu daily.     HYPERTENSION - WORSENING  BP Readings from Last 3 Encounters:   08/09/19 (!) 144/94   07/23/19 (!) 132/98   07/16/19 130/82     Her blood pressure continues to be an issue and seems to be rising despite the addition of hctz 12.5 on 6/27/19 and increased to 25 mg on 7/23/19.     For weight loss for now I am avoiding stimulants and have suggested adding another medication, we discussed lisinopril today or follow up with PCP. She wants to make appt w/ PCP and I think this is a good idea.I have recommended she also bring in her home bp cuff to calibrate because she reports home bp is always normal.  She is dealing with a lot of back and neck pain and not sleeping well because of it and her stress levels are increased. All of this could be contributing to elevated bp and to increased weight.     CHRONIC NECK AND BACK PAIN  Worsening - clearly having a lot of discomfort today, constantly moving and trying to getcomfortable. Recommend fu w PCP. She continues to maintain a  45+ lb weight loss since 2017 and wants to lose more       Follow up in in 1-3 months and try to work with PCP to get bp under control in the meantime.

## 2019-08-22 ENCOUNTER — COMMUNICATION - HEALTHEAST (OUTPATIENT)
Dept: FAMILY MEDICINE | Facility: CLINIC | Age: 39
End: 2019-08-22

## 2019-08-22 DIAGNOSIS — R03.0 ELEVATED BLOOD PRESSURE READING WITHOUT DIAGNOSIS OF HYPERTENSION: ICD-10-CM

## 2019-09-03 ENCOUNTER — COMMUNICATION - HEALTHEAST (OUTPATIENT)
Dept: FAMILY MEDICINE | Facility: CLINIC | Age: 39
End: 2019-09-03

## 2019-10-17 ENCOUNTER — COMMUNICATION - HEALTHEAST (OUTPATIENT)
Dept: FAMILY MEDICINE | Facility: CLINIC | Age: 39
End: 2019-10-17

## 2019-11-08 ENCOUNTER — HEALTH MAINTENANCE LETTER (OUTPATIENT)
Age: 39
End: 2019-11-08

## 2019-12-21 ENCOUNTER — RECORDS - HEALTHEAST (OUTPATIENT)
Dept: LAB | Facility: CLINIC | Age: 39
End: 2019-12-21

## 2019-12-23 LAB — BACTERIA SPEC CULT: NORMAL

## 2019-12-24 ENCOUNTER — RECORDS - HEALTHEAST (OUTPATIENT)
Dept: LAB | Facility: CLINIC | Age: 39
End: 2019-12-24

## 2019-12-26 LAB — BACTERIA SPEC CULT: NORMAL

## 2020-01-07 ENCOUNTER — COMMUNICATION - HEALTHEAST (OUTPATIENT)
Dept: SURGERY | Facility: CLINIC | Age: 40
End: 2020-01-07

## 2020-02-23 ENCOUNTER — HEALTH MAINTENANCE LETTER (OUTPATIENT)
Age: 40
End: 2020-02-23

## 2020-05-05 ENCOUNTER — COMMUNICATION - HEALTHEAST (OUTPATIENT)
Dept: FAMILY MEDICINE | Facility: CLINIC | Age: 40
End: 2020-05-05

## 2020-09-02 ENCOUNTER — RECORDS - HEALTHEAST (OUTPATIENT)
Dept: ADMINISTRATIVE | Facility: OTHER | Age: 40
End: 2020-09-02

## 2020-10-01 NOTE — ASSESSMENT & PLAN NOTE
Lab Results   Component Value Date    CHOL 224 (H) 11/07/2017    CHOL 216 (H) 03/30/2017     Lab Results   Component Value Date    HDL 85 11/07/2017    HDL 63 03/30/2017     Lab Results   Component Value Date    LDLCALC 129 11/07/2017    LDLCALC 137 (H) 03/30/2017     Lab Results   Component Value Date    TRIG 50 11/07/2017    TRIG 82 03/30/2017     No components found for: CHOLHDL    Overall improved.  The total cholesterol has likely gone up due to the elevated HDL cholesterol which is a positive change.  Encouraged continued weight loss efforts.  Continue phentermine.    Initial Weight: 253.5 lbs  Weight: 185 lb 8 oz (84.1 kg)  Weight loss from initial: 68  % Weight loss: 26.82 %      No

## 2020-10-14 ENCOUNTER — OFFICE VISIT - HEALTHEAST (OUTPATIENT)
Dept: FAMILY MEDICINE | Facility: CLINIC | Age: 40
End: 2020-10-14

## 2020-10-14 DIAGNOSIS — E78.2 MIXED HYPERLIPIDEMIA: ICD-10-CM

## 2020-10-14 DIAGNOSIS — I10 ESSENTIAL HYPERTENSION: ICD-10-CM

## 2020-10-14 DIAGNOSIS — E55.9 VITAMIN D DEFICIENCY DISEASE: ICD-10-CM

## 2020-10-14 DIAGNOSIS — Z00.00 ROUTINE GENERAL MEDICAL EXAMINATION AT A HEALTH CARE FACILITY: ICD-10-CM

## 2020-10-14 LAB
ALBUMIN SERPL-MCNC: 3.8 G/DL (ref 3.5–5)
ALBUMIN UR-MCNC: NEGATIVE MG/DL
ALP SERPL-CCNC: 110 U/L (ref 45–120)
ALT SERPL W P-5'-P-CCNC: 14 U/L (ref 0–45)
ANION GAP SERPL CALCULATED.3IONS-SCNC: 9 MMOL/L (ref 5–18)
APPEARANCE UR: CLEAR
AST SERPL W P-5'-P-CCNC: 31 U/L (ref 0–40)
BILIRUB SERPL-MCNC: 0.5 MG/DL (ref 0–1)
BILIRUB UR QL STRIP: NEGATIVE
BUN SERPL-MCNC: 8 MG/DL (ref 8–22)
CALCIUM SERPL-MCNC: 9.1 MG/DL (ref 8.5–10.5)
CHLORIDE BLD-SCNC: 105 MMOL/L (ref 98–107)
CHOLEST SERPL-MCNC: 229 MG/DL
CO2 SERPL-SCNC: 25 MMOL/L (ref 22–31)
COLOR UR AUTO: YELLOW
CREAT SERPL-MCNC: 0.72 MG/DL (ref 0.6–1.1)
FASTING STATUS PATIENT QL REPORTED: YES
GFR SERPL CREATININE-BSD FRML MDRD: >60 ML/MIN/1.73M2
GLUCOSE BLD-MCNC: 105 MG/DL (ref 70–125)
GLUCOSE UR STRIP-MCNC: NEGATIVE MG/DL
HDLC SERPL-MCNC: 88 MG/DL
HGB BLD-MCNC: 13.1 G/DL (ref 12–16)
HGB UR QL STRIP: NEGATIVE
KETONES UR STRIP-MCNC: NEGATIVE MG/DL
LDLC SERPL CALC-MCNC: 129 MG/DL
LEUKOCYTE ESTERASE UR QL STRIP: NEGATIVE
NITRATE UR QL: NEGATIVE
PH UR STRIP: 7 [PH] (ref 5–8)
POTASSIUM BLD-SCNC: 4.3 MMOL/L (ref 3.5–5)
PROT SERPL-MCNC: 7 G/DL (ref 6–8)
SODIUM SERPL-SCNC: 139 MMOL/L (ref 136–145)
SP GR UR STRIP: 1.02 (ref 1–1.03)
TRIGL SERPL-MCNC: 59 MG/DL
UROBILINOGEN UR STRIP-ACNC: NORMAL

## 2020-10-14 ASSESSMENT — ANXIETY QUESTIONNAIRES
GAD7 TOTAL SCORE: 6
1. FEELING NERVOUS, ANXIOUS, OR ON EDGE: SEVERAL DAYS
4. TROUBLE RELAXING: SEVERAL DAYS
6. BECOMING EASILY ANNOYED OR IRRITABLE: SEVERAL DAYS
7. FEELING AFRAID AS IF SOMETHING AWFUL MIGHT HAPPEN: NOT AT ALL
3. WORRYING TOO MUCH ABOUT DIFFERENT THINGS: MORE THAN HALF THE DAYS
5. BEING SO RESTLESS THAT IT IS HARD TO SIT STILL: NOT AT ALL
2. NOT BEING ABLE TO STOP OR CONTROL WORRYING: SEVERAL DAYS
IF YOU CHECKED OFF ANY PROBLEMS ON THIS QUESTIONNAIRE, HOW DIFFICULT HAVE THESE PROBLEMS MADE IT FOR YOU TO DO YOUR WORK, TAKE CARE OF THINGS AT HOME, OR GET ALONG WITH OTHER PEOPLE: SOMEWHAT DIFFICULT

## 2020-10-14 ASSESSMENT — PATIENT HEALTH QUESTIONNAIRE - PHQ9: SUM OF ALL RESPONSES TO PHQ QUESTIONS 1-9: 8

## 2020-10-14 ASSESSMENT — MIFFLIN-ST. JEOR: SCORE: 1737.96

## 2020-10-15 LAB
25(OH)D3 SERPL-MCNC: 23.9 NG/ML (ref 30–80)
25(OH)D3 SERPL-MCNC: 23.9 NG/ML (ref 30–80)

## 2020-10-28 ENCOUNTER — RECORDS - HEALTHEAST (OUTPATIENT)
Dept: HEALTH INFORMATION MANAGEMENT | Facility: CLINIC | Age: 40
End: 2020-10-28

## 2020-11-06 ENCOUNTER — COMMUNICATION - HEALTHEAST (OUTPATIENT)
Dept: FAMILY MEDICINE | Facility: CLINIC | Age: 40
End: 2020-11-06

## 2020-11-06 DIAGNOSIS — I10 ESSENTIAL HYPERTENSION: ICD-10-CM

## 2020-12-06 ENCOUNTER — HEALTH MAINTENANCE LETTER (OUTPATIENT)
Age: 40
End: 2020-12-06

## 2021-04-30 ENCOUNTER — OFFICE VISIT - HEALTHEAST (OUTPATIENT)
Dept: FAMILY MEDICINE | Facility: CLINIC | Age: 41
End: 2021-04-30

## 2021-04-30 ENCOUNTER — RECORDS - HEALTHEAST (OUTPATIENT)
Dept: ADMINISTRATIVE | Facility: OTHER | Age: 41
End: 2021-04-30

## 2021-04-30 ENCOUNTER — COMMUNICATION - HEALTHEAST (OUTPATIENT)
Dept: FAMILY MEDICINE | Facility: CLINIC | Age: 41
End: 2021-04-30

## 2021-04-30 DIAGNOSIS — E66.01 CLASS 3 SEVERE OBESITY DUE TO EXCESS CALORIES WITH SERIOUS COMORBIDITY AND BODY MASS INDEX (BMI) OF 40.0 TO 44.9 IN ADULT (H): ICD-10-CM

## 2021-04-30 DIAGNOSIS — E78.2 MIXED HYPERLIPIDEMIA: ICD-10-CM

## 2021-04-30 DIAGNOSIS — E66.812 CLASS 2 OBESITY WITH ALVEOLAR HYPOVENTILATION, SERIOUS COMORBIDITY, AND BODY MASS INDEX (BMI) OF 35.0 TO 35.9 IN ADULT (H): ICD-10-CM

## 2021-04-30 DIAGNOSIS — G89.29 OTHER CHRONIC PAIN: ICD-10-CM

## 2021-04-30 DIAGNOSIS — M54.6 ACUTE MIDLINE THORACIC BACK PAIN: ICD-10-CM

## 2021-04-30 DIAGNOSIS — E66.813 CLASS 3 SEVERE OBESITY DUE TO EXCESS CALORIES WITH SERIOUS COMORBIDITY AND BODY MASS INDEX (BMI) OF 40.0 TO 44.9 IN ADULT (H): ICD-10-CM

## 2021-04-30 DIAGNOSIS — I10 ESSENTIAL HYPERTENSION: ICD-10-CM

## 2021-04-30 DIAGNOSIS — E66.2 CLASS 2 OBESITY WITH ALVEOLAR HYPOVENTILATION, SERIOUS COMORBIDITY, AND BODY MASS INDEX (BMI) OF 35.0 TO 35.9 IN ADULT (H): ICD-10-CM

## 2021-04-30 ASSESSMENT — MIFFLIN-ST. JEOR: SCORE: 1772.67

## 2021-04-30 NOTE — ASSESSMENT & PLAN NOTE
Worsening.   rx liraglutide today.   Not interested in weight loss surgery. Decided to watch bariatric surgery video.   Not interested in 24 week program.     Dx: Initial BMI 42.18 with comorbid weight related conditions including chronic pain, hyperlipidemia, and vitamin D deficiency.  She also appears to have severe binge eating disorder.    Initial Weight: 253.5 lbs bmi 42.18, 3/30/2017  Weight: 172 lb (78 kg) bmi 29.52, 5/23/2018   Weight: 178 lb (80.7 kg) bmi 30.55 7/24/2018   Weight: 204 lb (92.5 kg) Body mass index is 35.02 kg/m . 12/13/2018   Weight: 201 lb (91.2 kg) bmi 34.50, 1/16/2019   Weight: 200 lb 11.2 oz (91 kg) bmi 34.45, 2/14/2019   Weight: 206 lb 8 oz (93.7 kg) , bmi 35.45, 4/9/2019   Weight: 211 lb 1.6 oz (95.8 kg), bmi 36.24, 7/9/2019   Weight: 207 lb 6.4 oz (94.1 kg), bmi 35.60, 7/23/2019   Weight: 205 lb (93 kg), bmi 35.19, 8/11/2019   Weight: (!) 242 lb 14.4 oz (110.2 kg) - bmi 40.42  Weight loss from initial: 10.6  % Weight loss: 4.18 %    PHENTERMINE 37.5 MG PO Q DAY  3/30/2018 -1/24/2018 - 73 LB DOWN     PHENTERMINE 37.5MG PO Q DAY 5/7 D PER WEEK  1/24/2018 - 5/23/2018 - 8 LBS ADDITIONALLY DOWN (TOTAL 81.5 LB DOWN)  Dose decreased 5/2018     PHENTERMINE 37.5 MG PO 3/7 D PER WEEK  5/23/2018 - 7/24/2018 - gained 6 lbs.   Dc phentermine 7/24/2018       CONTRAVE 2 PILLS two times a day (START SLOWLY)  7/24/2018 - - SELF DISCONTINUED.      NALOXONE  8/9/19 - start naloxone (plan to add wellbutrin if desired in future to simulate contrave with less cost).    4/30/2021 she says she never tried this.     Vyvanse 30 MG orally per morning-  indication-  severe binge eating disorder.   12/13/2018 - 1/16/2019 - she finds it helpful.   1/16/2019 INCREASED DOSE     VYVANSE 30 MG PO two times a day  1/16/2019 - INCREASED DOSE FROM 30 MG PO Q DAY.  Watch pvcs.     LIRAGLUTIDE -  1/16/2019 - DISCUSSED.  4/30/2021 saxenda rx sent, [facility] team tagged

## 2021-04-30 NOTE — ASSESSMENT & PLAN NOTE
BP Readings from Last 3 Encounters:   04/30/21 138/90   10/14/20 132/82   10/03/19 136/77      Managed by PCP   Weight reduction recommended see bmi in problem list.

## 2021-05-04 ENCOUNTER — COMMUNICATION - HEALTHEAST (OUTPATIENT)
Dept: FAMILY MEDICINE | Facility: CLINIC | Age: 41
End: 2021-05-04

## 2021-05-07 ENCOUNTER — RECORDS - HEALTHEAST (OUTPATIENT)
Dept: ADMINISTRATIVE | Facility: OTHER | Age: 41
End: 2021-05-07

## 2021-05-21 ENCOUNTER — HOSPITAL ENCOUNTER (OUTPATIENT)
Dept: MAMMOGRAPHY | Facility: CLINIC | Age: 41
Discharge: HOME | End: 2021-05-21
Attending: FAMILY MEDICINE
Payer: COMMERCIAL

## 2021-05-21 DIAGNOSIS — Z12.31 VISIT FOR SCREENING MAMMOGRAM: ICD-10-CM

## 2021-05-25 ENCOUNTER — OFFICE VISIT - HEALTHEAST (OUTPATIENT)
Dept: PHARMACY | Facility: CLINIC | Age: 41
End: 2021-05-25

## 2021-05-25 DIAGNOSIS — E66.01 CLASS 3 SEVERE OBESITY DUE TO EXCESS CALORIES WITH SERIOUS COMORBIDITY AND BODY MASS INDEX (BMI) OF 40.0 TO 44.9 IN ADULT (H): ICD-10-CM

## 2021-05-25 DIAGNOSIS — E66.813 CLASS 3 SEVERE OBESITY DUE TO EXCESS CALORIES WITH SERIOUS COMORBIDITY AND BODY MASS INDEX (BMI) OF 40.0 TO 44.9 IN ADULT (H): ICD-10-CM

## 2021-05-26 ASSESSMENT — PATIENT HEALTH QUESTIONNAIRE - PHQ9: SUM OF ALL RESPONSES TO PHQ QUESTIONS 1-9: 8

## 2021-05-27 NOTE — PROGRESS NOTES
Assessment/Plan:      Diagnoses and all orders for this visit:    Neck pain  -     Ambulatory referral to Physical Therapy  -     XR Cervical Spine 2 - 3 VWS    Myofascial pain  -     amitriptyline (ELAVIL) 10 MG tablet  Dispense: 90 tablet; Refill: 1  -     Ambulatory referral to Physical Therapy    Bilateral hand numbness  -     amitriptyline (ELAVIL) 10 MG tablet  Dispense: 90 tablet; Refill: 1  -     Ambulatory referral to Physical Therapy    Headache, cervicogenic  -     amitriptyline (ELAVIL) 10 MG tablet  Dispense: 90 tablet; Refill: 1        Assessment: Pleasant 39 y.o. female with a history of hyperlipidemia, status post removal of adrenal tumor with:    1.   Chronic cervical spine pain upper thoracic spine pain parascapular pain with right greater than left armPain and paresthesias.  Symptoms are most consistent with myofascial pain in the cervical spine and parascapular region with mild cervical hypermobility and some myofascial pain and potential for thoracic outlet type symptoms.    2.  Bilateral hand paresthesias.  These have improved on the right after carpal tunnel release still having them on the left but the right hand pain remains.  Median neuropathy at the wrist and EMG very mild on the left.    3.  Occasional cervicogenic headaches.  Does have equivocal Chiari malformation.      Discussion:    1.  I discussed the diagnosis and treatment options.  We did the option for medications, further diagnostics, therapy.    2.  Plain film cervical spine to evaluate alignment and evaluate for any anomalies such as accessory rib.    3.  Start physical therapy as [facility] protocol for cervical parascapular strengthening.    4.  Trial amitriptyline 10 mg increasing to 30 mg over the next 3 weeks as tolerated to help with myofascial pain headaches and sleep.    5.  Follow-up 1 month      It was our pleasure caring for your patient today, if there any questions or concerns please do not hesitate to contact  us.      Subjective:   Patient ID: Lia Lemon is a 39 y.o. female.    History of Present Illness: *Patient presents as a transfer of care from Dr. Newton for evaluation of cervical spine thoracic spine pain arm pain paresthesias that are chronic.  Chronic issues for her dating back at least 5 years.  Worsening over time.  She has pain in the mid cervical spine into the right greater left parascapular region.  Seems to be worse with sitting as well as using her arms.  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  Seems to be better with using her standing workstation as well as lying down sometimes helps.  Pain is relatively constant.  She also gets some headaches that radiate from the neck up into the head.  Pain today is a 3/10, 7/10 at worst, 1/10 at best.    She has had numbness and tingling in both hands in the past mostly digits 1-4.  Has had EMGs in the past showing median neuropathy at the wrist which is very mild-mild.  Had carpal tunnel release in the right upper extremity which is helped the tingling but still has pain in the right arm as well as some tingling in the arm itself.  The left hand is unchanged has not had surgery there.    In the past has had some physical therapy for the cervical and parascapular pain with no benefit.  Has had labs in the past from PCP.  She has had low vitamin D and still remains low.  She just started supplementation.  Has had a mildly elevated CRP several years ago.  Lyme titer negative in the past.      Imaging: MRI report and images were personally reviewed and discussed with the patient.  A plastic model was utilized during the discussion.  MRI of theCervical spine personally reviewed.  This was in January 2018.  Shows some minimal degenerative changes C5-6 and 6-7 no central stenosis or foraminal stenosis.  Mild straightening of the cervical lordotic curve.  MRI brain shows equivocal Chiari malformation.    Review of Systems: Complains  of numbness and tingling in the hands along with weakness or coordination issues in the hands.  She has headaches difficulty swallowing at times.  No fevers unintentional weight loss, no bowel or bladder incontinence.    Past Medical History:   Diagnosis Date     Abdominal pain      Chronic pain     upper back  pain     CTS (carpal tunnel syndrome)     bilateral     Hyperlipidemia      Normal delivery     x4     Palpitations      Shoulder joint pain     right- past hx.     Vitamin D deficiency      Social history: 5 children.  Works as a nurse.  .      The following portions of the patient's history were reviewed and updated as appropriate: allergies, current medications, past family history, past medical history, past social history, past surgical history and problem list.      Objective:   Physical Exam:    Vitals:    04/05/19 1004   BP: 142/87   Pulse: 80       General:  Well-appearing female in no acute distress.  Overweight, pleasant, cooperative, and interactive throughout the examination and interview.  CV: No lower extremity edema on inspection or paltation.  Lymphatics: No cervical lymphadenopathy palpated.  Eyes: sclera clear.  Skin: No rashes or lesions seen over the head/neck, hairline, arms, legs,  .  Respirations unlabored.  MSK: Gait is normal.  Able to heel-toe walk without difficulty.  Negative Romberg.  Spine: normal AP curves of the C, T, and L spine.  Full range of motion in the cervical spine in all planes.  Slight hypermobility of the cervical spine.  No hypermobility of the elbows wrists or fingers.  Palpation: Tenderness to palpation over cervical and parascapular region with hypertonic tissue textures.  Extremities: Full range of motion of the shoulders, elbows, and wrists with no effusions or tenderness to palpation.  Negative arm drop, empty can, and Speed's test bilaterally.  Negative Goodman and Neer's test bilaterally.  Full range of motion of the  knees, and ankles from a seated  position with no effusions or tenderness to palpation.   Neurologic exam: Mental status: Patient is alert and oriented with normal affect.  Attention, knowledge, memory, and language are intact.  Normal coordination throughout the examination.  Reflexes are 2+ and symmetric biceps, triceps, brachioradialis, patellar, and Achilles with Negative Christian's.  Sensation is intact to light touch throughout the upper and lower extremities bilaterally.  Manual muscle testing reveals 5 out of 5 strength in the shoulder abductors, elbow flexors/extensors, wrist extensors, interosseous, and finger flexors; lower extremity strength appears grossly normal.   Normal muscle bulk and tone in the arms and legs.  Negative Spurling's test bilaterally.

## 2021-05-27 NOTE — PROGRESS NOTES
1. Carpal tunnel syndrome of right wrist  Ambulatory referral to Spine Care    ketorolac injection 30 mg (TORADOL)    DISCONTINUED: ketorolac injection 30 mg (TORADOL)   2. Acute midline thoracic back pain  Ambulatory referral to Spine Care    ketorolac injection 30 mg (TORADOL)    DISCONTINUED: ketorolac injection 30 mg (TORADOL)   3. Pain of right scapula  Ambulatory referral to Spine Care    ketorolac injection 30 mg (TORADOL)    DISCONTINUED: ketorolac injection 30 mg (TORADOL)   4. Elevated blood pressure reading without diagnosis of hypertension           ASSESSMENT/PLAN:     The following high BMI interventions were performed this visit: exercise promotion: strength training and exercise promotion: stretching    1. Carpal tunnel syndrome of right wrist    - Ambulatory referral to Spine Care  - ketorolac injection 30 mg (TORADOL)    2. Acute midline thoracic back pain    - Ambulatory referral to Spine Care  - ketorolac injection 30 mg (TORADOL)    3. Pain of right scapula    - Ambulatory referral to Spine Care  - ketorolac injection 30 mg (TORADOL)    4. Elevated blood pressure reading without diagnosis of hypertension    -continue to monitor, patient is asymptomatic today, is having increased pain which would explain this today      There are no Patient Instructions on file for this visit.  Medications Discontinued During This Encounter   Medication Reason     predniSONE (DELTASONE) 10 mg tablet Therapy completed     ketorolac injection 30 mg (TORADOL)      Return if symptoms worsen or fail to improve, for carpel tunnel, back and scapular pain.         Sanjana Botello NP          SUBJECTIVE:  Lia Lemon is a 39 y.o. female who presents for reevaluation of her ongoing right carpal tunnel pain with right shoulder pain, right scapular pain, right neck pain and right pectoral pain.  Pain is constant.  She describes it as a burning, sharp, achy sensation.  Aggravating factors include sitting too long,  bending, twisting her upper body, raising her hands above her head and when she is at work.  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  Does change positions frequently at work.  Was given a sit to stand desk due to her ongoing issues with pain in the neck and back.  She has had these symptoms for several years and recently was working with neurosurgery for her cervical spine and Chiari issues.  Did have EMG testing with MRI of the brain and neck back in January 2019.  She viewed her results online and then never returned back to the clinic for follow-up with her neurosurgeon Dr. Hennessy.  Past treatments for her ongoing headache pain, neck pain, scapular and carpal tunnel issues have included prednisone, Tylenol, ibuprofen, chiropractor visits with acupuncture, physical therapy, wearing a wrist brace and she did undergo carpal tunnel surgery of the right wrist in May 2018.  We did discuss sending her back to spine care today for further evaluation and testing versus starting her on medication such as Cymbalta or gabapentin, I also suggested referral to pain clinic.  It is not interested in narcotic therapy or Cymbalta at this time, she would consider gabapentin after she discusses her issues with spine care.  In the meantime, until we can get her into spine care, based on her most recent kidney and liver function studies in January, we will give her an injection of Toradol 30 mg today.  Advised her to continue with rotating his and ice packs along with ibuprofen or Tylenol.  I did give her a prescription for prednisone in February to try, she states that it did nothing for her discomfort.  Did notice some relief of symptoms when she was in [place] in the hotter weather.      Diastolic blood pressure continues to be elevated x2 today, she did have elevated blood pressures during her last office with me as well.  She denies any chest pain, shortness of breath,  dizziness, lower extremity swelling or diaphoresis.  Currently, she is not taking any sort of antihypertensive medication.  Chief Complaint   Patient presents with     Pain     RTwrist and mellissa/shoulder  - getting worse         Patient Active Problem List   Diagnosis     Palpitations     Abdominal Pain     Joint pain localized in the shoulder      Vaginal delivery     Class 1 obesity with alveolar hypoventilation, serious comorbidity, and body mass index (BMI) of 34.0 to 34.9 in adult (H)     Chronic pain     Vitamin D deficiency disease     Hyperlipidemia     Carpal tunnel syndrome of right wrist     Binge-eating disorder, severe     Elevated blood pressure reading without diagnosis of hypertension     Pain of right scapula     Acute midline thoracic back pain       Current Outpatient Medications   Medication Sig Dispense Refill     cholecalciferol, vitamin D3, (VITAMIN D3) 2,000 unit Tab Take 2000 IU daily  0     lisdexamfetamine (VYVANSE) 30 MG capsule Take 1 capsule (30 mg total) by mouth 2 (two) times a day. 60 capsule 0     OMEGA-3/DHA/EPA/FISH OIL (FISH OIL-OMEGA-3 FATTY ACIDS) 300-1,000 mg capsule Take 2 capsules (2 g total) by mouth daily.  0     No current facility-administered medications for this visit.        Social History     Tobacco Use   Smoking Status Never Smoker   Smokeless Tobacco Never Used       REVIEW OF SYSTEMS: Denies saddle anesthesia, numbness/weakness, loss of bowel/bladder control.      TOBACCO USE:  Social History     Tobacco Use   Smoking Status Never Smoker   Smokeless Tobacco Never Used     Social History     Socioeconomic History     Marital status:      Spouse name: Macario     Number of children: 5     Years of education: Not on file     Highest education level: Not on file   Occupational History     Occupation: RN Manager     Comment: [employer]   Social Needs     Financial resource strain: Not on file     Food insecurity:     Worry: Not on file     Inability: Not  on file     Transportation needs:     Medical: Not on file     Non-medical: Not on file   Tobacco Use     Smoking status: Never Smoker     Smokeless tobacco: Never Used   Substance and Sexual Activity     Alcohol use: Yes     Alcohol/week: 3.0 oz     Types: 5 Standard drinks or equivalent per week     Comment: occas.     Drug use: No     Sexual activity: Yes     Partners: Male     Birth control/protection: Condom   Lifestyle     Physical activity:     Days per week: Not on file     Minutes per session: Not on file     Stress: Not on file   Relationships     Social connections:     Talks on phone: Not on file     Gets together: Not on file     Attends Voodoo service: Not on file     Active member of club or organization: Not on file     Attends meetings of clubs or organizations: Not on file     Relationship status: Not on file     Intimate partner violence:     Fear of current or ex partner: Not on file     Emotionally abused: Not on file     Physically abused: Not on file     Forced sexual activity: Not on file   Other Topics Concern     Not on file   Social History Narrative     Not on file         OBJECTIVE:            Vitals:    03/27/19 1144   BP: (!) 128/98   Pulse:    Resp:    Temp:    SpO2:      No Data Recorded  Wt Readings from Last 3 Encounters:   02/14/19 200 lb 11.2 oz (91 kg)   01/16/19 201 lb (91.2 kg)   12/13/18 204 lb (92.5 kg)     There is no height or weight on file to calculate BMI.        Physical Exam:  GENERAL APPEARANCE: A&A, NAD, well hydrated, well nourished  NECK: Supple, without lymphadenopathy, no thyroid mass, no JVD, no bruit, she does have full range of motion but does report increased neck pain with forward flexion and hyperextension  CV: RRR, no M/G/R, no edema  LUNGS: CTAB, normal respiratory effort  ABDOMEN: S&NT, no masses, no organomegaly, BS present x4   EXTREMITY: no edema.  Increased right scapular pain with palpation starting above the right scapula extending down to the  left inner aspect. Right pectoral pain with palpation.  She also notices increased scapular and right shoulder pain with overhead extension, lateral side raise, and forward front raise.  She reports increased right wrist pain with prior sign,  strength is decreased slightly on the right.  Hands are cool to touch, capillary refill time is less than 3 seconds.  SKIN:  Normal skin turgor, no lesions/rashes, warm and dry

## 2021-05-27 NOTE — TELEPHONE ENCOUNTER
----- Message from Vinh Guzman DO sent at 4/8/2019  7:47 AM CDT -----  Plain films reviewed.  Shows the mild straightening/flattening of the cervical curve with the degenerative changes at C3-4.    Continue current plan of physical therapy.

## 2021-05-27 NOTE — PROGRESS NOTES
ASSESSMENT AND PLAN:     Problem List Items Addressed This Visit        High    Class 2 obesity with alveolar hypoventilation, serious comorbidity, and body mass index (BMI) of 35.0 to 35.9 in adult (H)     Dx: Initial BMI 42.18 with comorbid weight related conditions including chronic pain, hyperlipidemia, and vitamin D deficiency.  She also appears to have severe binge eating disorder.     Initial Weight: 253.5 lbs bmi 42.18, 3/30/2017  Weight: 172 lb (78 kg) bmi 29.52, 5/23/2018   Weight: 178 lb (80.7 kg) bmi 30.55 7/24/2018   Weight: 204 lb (92.5 kg) Body mass index is 35.02 kg/m . 12/13/2018   Weight: 201 lb (91.2 kg) bmi 34.50, 1/16/2019   Weight: 200 lb 11.2 oz (91 kg) bmi 34.45, 2/14/2019   Weight: 206 lb 8 oz (93.7 kg) , bmi 35.45, 4/9/2019   Weight loss from initial: 47  % Weight loss: 18.54 %    PHENTERMINE 37.5 MG PO Q DAY  3/30/2018 -1/24/2018 - 73 LB DOWN     PHENTERMINE 37.5MG PO Q DAY 5/7 D PER WEEK  1/24/2018 - 5/23/2018 - 8 LBS ADDITIONALLY DOWN (TOTAL 81.5 LB DOWN)  Starting to get difficult now, so will decrease dose 5/23/2018      PHENTERMINE 37.5 MG PO 3/7 D PER WEEK  5/23/2018 - 7/24/2018 - gained 6 lbs.   Dc phentermine 7/24/2018       CONTRAVE 2 PILLS two times a day (START SLOWLY)  7/24/2018 - - SELF DISCONTINUED.       Vyvanse 30 MG orally per morning-  indication-  severe binge eating disorder.   12/13/2018 - 1/16/2019 - she finds it helpful.   1/16/2019 INCREASED DOSE     VYVANSE 30 MG PO two times a day  1/16/2019 - INCREASED DOSE FROM 30 MG PO Q DAY.  Watch pvcs.     LIRAGLUTIDE -  1/16/2019 - DISCUSSED.        PLAN  WEIGHT MAINTENANCE PHASE - (BMI down from 42-34, but now starting to regain and bmi is up to 35)     CAP PLAN   Less than or equal to 208 lbs- control  Between 210 and 212 lbs - action  Greater than or equal to 213 - panic      vyvanse discontinued today due to elevated blood pressure.  I did suggest an antihypertensive medication to help with her blood pressure so that  "she can remain on a stimulant but she declined that.    Discussed saxenda and belviq, she declined both now but wants to look them up.     Given that her initial BMI was 42 and she has comorbidities including hyperlipidemia she may be a candidate for weight loss surgery.  I did offer up the option of watching a video learning more about weight loss surgery but she declined.     Goal this month: monitor bp. Discontinue all stimulants due to elevated bp.      Labs: 1/16/19-normal CBC and A1c is 5.1/improved, d 22.9, tsh nl, cmp nl, lipids normal (hdl high)     Vit d is 22 - recommend taking 5000 iu daily.      Follow up in 2-4 weeks to recheck bp and weight.              Unprioritized    Elevated blood pressure reading without diagnosis of hypertension     BP Readings from Last 3 Encounters:   04/09/19 (!) 182/110   04/05/19 142/87   03/27/19 (!) 128/98     vyvanse discontinued.   Follow up in 2-4 weeks to recheck bp.                 Chief Complaint   Patient presents with     Weight Loss        HPI  Lia Lemon is a 39 y.o. female comes in for weight loss follow up.    Goal this past month:  going to try and increase veggies in diet and research recipes for sauces.  Update on above goal:  We did not talk about this because she was really upset about her weight regain.  She feels like she is losing control of her weight.  She feels the Vyvanse is not as strong as the phentermine.  She would like to go back on the phentermine but her blood pressure is really high today.  She feels extremely stressed out.  She says she has had \"all the doctors appointments \"dealing with \"pain stuff \"she says she has hand and back pain in the scapular area.  She says her large chest is problematic and she tends to hunch.  She feels like she is being bounced around from doctor to doctor and she is not being given straight answers about her pain.  She deals with carpal tunnel pain and actually had surgery for carpal tunnel but it only " worked partially.  She is really scared to have any more surgeries because of the carpal tunnel surgery not being 100% successful.    Are you experiencing any side effects to the medications:  Yes, elevated bp.  Hunger control:  vyvanse helps, just not as effective as phentermine.   Exercise was discussed: has not been exercising, feels like she can start this.   Discussed journaling food:  She is not journaling. She has done this in the past. She felt it was not helpful.   Patient is pleased with the current results:  No.   The patient is following the nutrition plan:  Struggling.   Barriers to losing weight:  Highest bmi 42 and she feels her body is trying to regain the weight.     Social History     Tobacco Use   Smoking Status Never Smoker   Smokeless Tobacco Never Used      Current Outpatient Medications on File Prior to Visit   Medication Sig Dispense Refill     cholecalciferol, vitamin D3, (VITAMIN D3) 2,000 unit Tab Take 2000 IU daily  0     lisdexamfetamine (VYVANSE) 30 MG capsule Take 1 capsule (30 mg total) by mouth 2 (two) times a day. 60 capsule 0     OMEGA-3/DHA/EPA/FISH OIL (FISH OIL-OMEGA-3 FATTY ACIDS) 300-1,000 mg capsule Take 2 capsules (2 g total) by mouth daily.  0     amitriptyline (ELAVIL) 10 MG tablet 1 tab at bedtime x 5 days.  Then may increase to 2 tabs at bedtime x 5 days, then may increase to 3 caps at bedtime. 90 tablet 1     No current facility-administered medications on file prior to visit.       Allergies   Allergen Reactions     Morphine Hives and Itching     Can take vicodin and percocet       Review of Systems   Constitutional: Negative.    HENT: Negative.    Eyes: Negative.    Respiratory: Negative.  Negative for chest tightness and shortness of breath.    Cardiovascular: Negative.  Negative for chest pain.   Gastrointestinal: Negative.    Endocrine: Negative.    Genitourinary: Negative.    Musculoskeletal: Negative.    Skin: Negative.    Neurological: Negative.   "  Hematological: Negative.    Psychiatric/Behavioral: Negative.         OBJECTIVE: BP (!) 182/110   Pulse 88   Resp 16   Ht 5' 4\" (1.626 m)   Wt 206 lb 8 oz (93.7 kg) Comment: declined today  BMI 35.45 kg/m     Physical Exam   Constitutional: She is oriented to person, place, and time. She appears well-developed and well-nourished. No distress.   HENT:   Head: Normocephalic and atraumatic.   Eyes: Conjunctivae are normal.   Neck: Neck supple.   Cardiovascular: Normal rate and regular rhythm.   Pulmonary/Chest: Effort normal.   Musculoskeletal: Normal range of motion.   Neurological: She is alert and oriented to person, place, and time.   Skin: Skin is warm and dry.   Psychiatric: She has a normal mood and affect.        This note was created using Dragon dictation.  Please excuse any grammatical errors.    "

## 2021-05-28 ASSESSMENT — ANXIETY QUESTIONNAIRES: GAD7 TOTAL SCORE: 6

## 2021-05-28 NOTE — PROGRESS NOTES
[facility]   Cervical Thoracic Initial Evaluation    Patient Name: Lia Lemon  Date of evaluation: 5/2/2019  Referral Diagnosis: Neck pain  Referring provider: Vinh Guzman DO  Visit Diagnosis:     ICD-10-CM    1. Cervicalgia M54.2    2. Chronic scapular pain M89.8X1     G89.29    3. Poor posture R29.3    4. Myofascial pain M79.18        Assessment:        Patient is a 39 y.o. female that presents with signs and symptoms consistent with R>L cervical and scapular neck pain secondary to possible thoracic joint stiffness, scapular muscle instability/weakness, chiari malformation and/or weight of her breasts causing her increased pain symptoms - all could be contributing factors to patient's pain symptoms. Patient demonstrates impairments including decreased thoracic joint mobility and flexibility, with poor postural strength and awareness, and weak core (has given birth to 5 children, most recent 2016) leading to impaired functional mobility. Patient's functional limitations include rolling over in bed, sitting for longer than 10 minutes, sleeping comfortable at night, turning her head in all directions, and performing daily work/house duties. Today patient was tested on cervical MedX and demonstrates below average strength at all points of motion, especially at end range extension.    Patient educated, demonstrated understanding and is in agreement with nature of impairment, plan of care, patient role and HEP. Patient compliant with PT and prognosis is good. Patient would benefit from skilled PT to progress and improve above impairments.    The POC is dynamic and will be modified on an ongoing basis.  Patient will return to clinic if symptoms persist.  Barriers to achieving goals as noted in the assessment section may affect outcome.  Prognosis to achieve goals is  good   Pt. is appropriate for skilled PT intervention as outlined in the Plan of Care (POC).  Pt. is a good candidate for skilled PT  services to improve pain levels and function.    Goals:  Pt. will be independent with home exercise program in : 4 weeks  Pt. will have improved quality of sleep: with less pain;waking less times/night;in 6 weeks  Pt. will improve posture : and demonstrate posture with minimal to no cuing;in sitting;in standing;30 minutes;for working;in 6 weeks  Patient will sit: 10 minutes;for work;for driving;with no pain;with less difficultty;in 6 weeks    Pt will: demonstrate above average strength for all ROM on MedX by 8 weeks.  Pt will: report decreased instances to 1x/week of pain symptoms by 6 weeks.       Patient's expectations/goals are realistic.    Barriers to Learning or Achieving Goals:  No Barriers.  Chronicity of the problem.       Plan / Patient Instructions:        Plan of Care:   No data recorded    POC and pathology of condition were reviewed with patient.  Pt. is in agreement with the Plan of Care  A Home Exercise Program (HEP) was initiated today.  Pt. was instructed in exercises by PT and patient was given a handout with detailed instructions.    Plan for next visit: initiate cervical DE and rotary torso, review HEP, pec doorway stretching, scapular strengthening, possible use of Ktape or TENS unit?, core strengthening and postural review, manual cervical traction and MFR to scapular musculature, eventual reformer - test for possible TOS?     Subjective:         History of Present Illness:    Lia is a 39 y.o. female who presents to therapy today with complaints of interscapular muscle pain. Date of onset is April 2019 and onset was gradual. Symptoms are constant and not improving. Patient reports going to chiropractor in 2017, she did have chiari malformation found - unsure if this is causing it. When her neck and back pain are worse her R hand pain gets worse too. Patient has a standing work station at some of her offices, sitting is the worst. Patient is unsure if some of her pain is due to her breast  size. Patient might think that it is TOS. Patient says her pain is hit or miss, some days are good some are bad for her. She reports a constant  history of similar symptoms. She describes their previous level of function as not limited. She did have carpal tunnel surgery in R but not L. She sleeps on her stomach with her hands above her head. 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    Pain Ratin  Pain rating at best: 1  Pain rating at worst: 7  Pain description: aching, burning, sharp and weakness    Functional limitations are described as occurring with:   looking up, looking down and turning head  performing routine daily activities  sitting for about 5-10 min  sleeping    Patient reports benefit from:  heat, cold, standing or laying         Objective:      Note: Items left blank indicates the item was not performed or not indicated at the time of the evaluation.    Patient Outcome Measures :    Neck Disability Score in %: 34     Scores range from 0-100%, where a score of 0% represents minimal pain and maximal function. The minmal clinically important difference is a score reduction of 10%.    Cervical Thoracic Examination  1. Cervicalgia     2. Chronic scapular pain     3. Poor posture     4. Myofascial pain       Involved side: Right and Bilateral  Posture Observation:      General sitting posture is  normal.  General standing posture is normal.    Cervical ROM:    Date: 2019     *Indicate scale AROM AROM AROM   Cervical Flexion 65 P     Cervical Extension 65 P      Right Left Right Left Right Left   Cervical Sidebending 45 45 pull on R scapula       Cervical Rotation 80 P 85 P       Cervical Protraction      Cervical Retraction      Thoracic Flexion      Thoracic Extension      Thoracic Sidebending         Thoracic Rotation           Strength   WFL no pain    Cervical Special Tests     Cervical Special Tests Right Left UE Nerve Mobility Right Left   Cervical compression   Median nerve -  -   Cervical distraction Pain relief  Ulnar nerve - -   Spurling s test   Radial nerve     Shoulder abduction sign   Thoracic outlet     Deep neck flexor endurance test   Nelson     Upper cervical rotation   Adson s     Sharper-Jose Raul   Cervical rotation lateral flexion     Alar ligament test   Other:     Other:   Other:         Flexibility/Tissue Extensibility: decreased of R>L cervical paraspinals, levators, pectorals, rhomboids  Palpation: TTP but no pain at rhomboids and LT, UT  Passive Mobility-Joint Integrity: Hypomobile.      Treatment Today      Patient Education: Patient educated on plan of care, prognosis, PT/patient role and HEP. Patient educated on impairments related to condition and reproduction of symptoms. Patient instructed to focus on the small goals and this may be a long process to recovery, and that exercises at home are just as important as coming to therapy. Patient was educated on importance of exercise consistency and activity modification in order to see progress and change. Patient demonstrated and verbalized understanding.     Cervical MedX Initial testing (5/2/19) 4-week Re-test   AROM (full= 0-120  cervical) 0-96    Max Extension Torque  204#    Flex: ext ratio (ideal 1.4:1) 1.45:1          Exercises:  Exercise #1: supine chin tuck - hold 5 sec x 10  Comment #1: supine pec stretch with breathing - hold 60 seconds  Exercise #2: lat stretch at countertop/sink - hold 30 sec x 2  Comment #2: seated QL stretch with lumbar flexion bias - hold 30 sec x 2  Exercise #3: sidelying reach/roll - 10 reps         TREATMENT MINUTES COMMENTS   Evaluation 25    Self-care/ Home management     Manual therapy     Neuromuscular Re-education     Therapeutic Activity     Therapeutic Exercises 30 See flowsheet  Lumbar IM   Gait training     Modality__________________                Total 55    Blank areas are intentional and mean the treatment did not include these items.     PT Evaluation Code: (Please list  factors)  Patient History/Comorbidities: chronic pain, obesity, chiari malformation  Examination: decreased scapular stability and postural awareness  Clinical Presentation: stable  Clinical Decision Making: low    Patient History/  Comorbidities Examination  (body structures and functions, activity limitations, and/or participation restrictions) Clinical Presentation Clinical Decision Making (Complexity)   No documented Comorbidities or personal factors 1-2 Elements Stable and/or uncomplicated Low   1-2 documented comorbidities or personal factor 3 Elements Evolving clinical presentation with changing characteristics Moderate   3-4 documented comorbidities or personal factors 4 or more Unstable and unpredictable High                Stacey Saucedo, PT  5/2/2019  7:50 AM

## 2021-05-28 NOTE — PROGRESS NOTES
[facility] Daily Progress     Patient Name: Lia Lemon  Date: 5/7/2019   Initial Eval: 5/2/19  Visit #: 2  Referral Diagnosis: neck pain and myofascial pain  Referring provider: Dr. Vinh Guzman  Visit Diagnosis:     ICD-10-CM    1. Cervicalgia M54.2    2. Chronic scapular pain M89.8X1     G89.29    3. Poor posture R29.3    4. Myofascial pain M79.18          Assessment:   Patient seen for first MedX follow-up this session and is currently in the 1st week of the MedX program. PT initiated dynamic exercise and rotary torso. Treatment time limited this visit due to late arrival but patient tolerated exercise session well.    HEP/POC compliance is  fair .  Patient is appropriate to continue with skilled physical therapy intervention, as indicated by initial plan of care.    Goal Status:  Pt. will be independent with home exercise program in : 4 weeks  Pt. will have improved quality of sleep: with less pain;waking less times/night;in 6 weeks  Pt. will improve posture : and demonstrate posture with minimal to no cuing;in sitting;in standing;30 minutes;for working;in 6 weeks  Patient will sit: 10 minutes;for work;for driving;with no pain;with less difficultty;in 6 weeks    Pt will: demonstrate above average strength for all ROM on MedX by 8 weeks.  Pt will: report decreased instances to 1x/week of pain symptoms by 6 weeks.     From initial plan of care:  Patient is a 39 y.o. female that presents with signs and symptoms consistent with R>L cervical and scapular neck pain secondary to possible thoracic joint stiffness, scapular muscle instability/weakness, chiari malformation and/or weight of her breasts causing her increased pain symptoms - all could be contributing factors to patient's pain symptoms. Patient demonstrates impairments including decreased thoracic joint mobility and flexibility, with poor postural strength and awareness, and weak core (has given birth to 5 children, most recent 2016) leading  "to impaired functional mobility. Patient's functional limitations include rolling over in bed, sitting for longer than 10 minutes, sleeping comfortable at night, turning her head in all directions, and performing daily work/house duties. Today patient was tested on cervical MedX and demonstrates below average strength at all points of motion, especially at end range extension.  Plan / Patient Education:     Continue with initial plan of care.  Progress with home program as tolerated.    PLAN for next visit:MedX scapular strengthening, possible use of Ktape or TENS unit?, core strengthening and postural review, manual cervical traction and MFR to scapular musculature, eventual reformer - test for possible TOS?  Subjective:     Pain Rating: 3  She is a little limited with HEP due to grafting she had placed on her gums but she really likes the reach and roll. She would like to review the cervical retraction exercise.      Objective:     Exercises: (see flow sheet for date performed)  Exercise #1: supine chin tuck - hold 5 sec x 10  Comment #1: supine pec stretch with breathing - hold 60 seconds, changed to standing unilateral at wall, paitent wanted more of a stretch   Exercise #2: lat stretch at countertop/sink - hold 30 sec x 2, verbally reviewed  Comment #2: seated QL stretch with lumbar flexion bias - hold 30 sec x 2  Exercise #3: sidelying reach/roll - 10 reps   Comment #3: UBE x 3 minutes  Exercise #4: Rotary Torso 90\" 26# started to the R    Cervical MedX Initial testing (5/2/19) 4-week Re-test   AROM (full= 0-120  cervical) 0-96    Max Extension Torque  204#    Flex: ext ratio (ideal 1.4:1) 1.45:1        Treatment Today     TREATMENT MINUTES COMMENTS   Evaluation     Self-care/ Home management     Manual therapy     Neuromuscular Re-education     Therapeutic Activity     Therapeutic Exercises 20 See flow sheet  Patient with decrease in pain to 2/10 post treatment   Gait training     Modality__________________   "              Total 20    Blank areas are intentional and mean the treatment did not include these items.       Debby Zhou  5/7/2019

## 2021-05-28 NOTE — PROGRESS NOTES
[facility] Daily Progress     Patient Name: Lia Lemon  Date: 5/9/2019   Initial Eval: 5/2/19  Visit #: 3/13  Referral Diagnosis: neck pain and myofascial pain  Referring provider: Dr. Vinh Guzman  Visit Diagnosis:     ICD-10-CM    1. Cervicalgia M54.2    2. Chronic scapular pain M89.8X1     G89.29    3. Poor posture R29.3    4. Myofascial pain M79.18          Assessment:     Patient seen for MedX follow-up currently in the 2nd week of the MedX program and reports she might be feeling a little bit worse, some soreness but more pain, not necessarily due to MedX. Patient was given scapular theraband strengthening today.     HEP/POC compliance is  fair .  Patient is appropriate to continue with skilled physical therapy intervention, as indicated by initial plan of care.    Goal Status:  Pt. will be independent with home exercise program in : 4 weeks  Pt. will have improved quality of sleep: with less pain;waking less times/night;in 6 weeks  Pt. will improve posture : and demonstrate posture with minimal to no cuing;in sitting;in standing;30 minutes;for working;in 6 weeks  Patient will sit: 10 minutes;for work;for driving;with no pain;with less difficultty;in 6 weeks    Pt will: demonstrate above average strength for all ROM on MedX by 8 weeks.  Pt will: report decreased instances to 1x/week of pain symptoms by 6 weeks.     From initial plan of care:  Patient is a 39 y.o. female that presents with signs and symptoms consistent with R>L cervical and scapular neck pain secondary to possible thoracic joint stiffness, scapular muscle instability/weakness, chiari malformation and/or weight of her breasts causing her increased pain symptoms - all could be contributing factors to patient's pain symptoms. Patient demonstrates impairments including decreased thoracic joint mobility and flexibility, with poor postural strength and awareness, and weak core (has given birth to 5 children, most recent 2016)  "leading to impaired functional mobility. Patient's functional limitations include rolling over in bed, sitting for longer than 10 minutes, sleeping comfortable at night, turning her head in all directions, and performing daily work/house duties.     Plan / Patient Education:     Continue with initial plan of care.  Progress with home program as tolerated.    PLAN for next visit: MedX,  scapular strengthening, possible use of Ktape or TENS unit?, core strengthening and postural review, manual cervical traction and MFR to scapular musculature, eventual reformer - test for possible TOS?    Subjective:     Pain Rating: 3 right now  Feeling worse, later in the day it has been increasing, yesterday she felt some uncomfortableness and stretches tended to help.     Objective:     Exercises: (see flow sheet for date performed)  Exercise #1: supine chin tuck - hold 5 sec x 10  Comment #1: supine pec stretch with breathing - hold 60 seconds, changed to standing unilateral at wall, paitent wanted more of a stretch   Exercise #2: lat stretch at countertop/sink - hold 30 sec x 2, verbally reviewed  Comment #2: seated QL stretch with lumbar flexion bias - hold 30 sec x 2  Exercise #3: sidelying reach/roll - 10 reps   Comment #3: UBE x 3 minutes  Exercise #4: Rotary Torso 90\" 28# started to the L  Comment #4: tband row and extension - hold 5 sec x 10    Cervical MedX Initial testing (5/2/19) 4-week Re-test   AROM (full= 0-120  cervical) 0-96    Max Extension Torque  204#    Flex: ext ratio (ideal 1.4:1) 1.45:1        Enter Week / Visit #: W 2 V 1  Weight (lbs): 114# - inc at next visit  Reps (#): 30  Time: 193s  ROM (degrees): 0-96  Pain: 2  Flex:Ext ratio: 1.45:1        Treatment Today     TREATMENT MINUTES COMMENTS   Evaluation     Self-care/ Home management     Manual therapy 10 MFR R>L upper trap, levators, supraspinatus and cervical paraspinals    Neuromuscular Re-education     Therapeutic Activity     Therapeutic Exercises 20 " See flow sheet  Lumbar DE and rotary torso  Added Tband row/extension   Gait training     Modality__________________                Total 30    Blank areas are intentional and mean the treatment did not include these items.       Stacey Saucedo  5/9/2019

## 2021-05-28 NOTE — PROGRESS NOTES
[facility] Daily Progress     Patient Name: Lia Lemon  Date: 5/16/2019   Initial Eval: 5/2/19  Visit #: 4/13  Referral Diagnosis: neck pain and myofascial pain  Referring provider: Dr. Vinh Guzman  Visit Diagnosis:     ICD-10-CM    1. Cervicalgia M54.2    2. Chronic scapular pain M89.8X1     G89.29    3. Poor posture R29.3    4. Myofascial pain M79.18          Assessment:     Patient is currently in the 2nd week of the MedX program and reporting some soreness following the last session ~24 hours. PT educated the patient that in the short-term this can be normal. PT encouraged compliance with the scapular resistance exercises and posture checks. PT continued with MedX and Manual therapy this visit.    HEP/POC compliance is  fair .  Patient is appropriate to continue with skilled physical therapy intervention, as indicated by initial plan of care.    Goal Status:  Pt. will be independent with home exercise program in : 4 weeks  Pt. will have improved quality of sleep: with less pain;waking less times/night;in 6 weeks  Pt. will improve posture : and demonstrate posture with minimal to no cuing;in sitting;in standing;30 minutes;for working;in 6 weeks  Patient will sit: 10 minutes;for work;for driving;with no pain;with less difficultty;in 6 weeks    Pt will: demonstrate above average strength for all ROM on MedX by 8 weeks.  Pt will: report decreased instances to 1x/week of pain symptoms by 6 weeks.     From initial plan of care:  Patient is a 39 y.o. female that presents with signs and symptoms consistent with R>L cervical and scapular neck pain secondary to possible thoracic joint stiffness, scapular muscle instability/weakness, chiari malformation and/or weight of her breasts causing her increased pain symptoms - all could be contributing factors to patient's pain symptoms. Patient demonstrates impairments including decreased thoracic joint mobility and flexibility, with poor postural strength and  "awareness, and weak core (has given birth to 5 children, most recent ) leading to impaired functional mobility. Patient's functional limitations include rolling over in bed, sitting for longer than 10 minutes, sleeping comfortable at night, turning her head in all directions, and performing daily work/house duties.     Plan / Patient Education:     Continue with initial plan of care.  Progress with home program as tolerated.    PLAN for next visit: MedX,  scapular strengthening, possible use of Ktape or TENS unit?, core strengthening and postural review, manual cervical traction and MFR to scapular musculature, eventual reformer - test for possible TOS?    Subjective:     Pain Ratin right now  Ruperto is doing okay. The last couple of days it has been uncomfortable. She was sore for the rest of the day  Pt and patient discussed this to be normal.  Objective:     Exercises: (see flow sheet for date performed)  Exercise #1: supine chin tuck - hold 5 sec x 10  Comment #1: supine pec stretch with breathing - hold 60 seconds, changed to standing unilateral at wall, ruperto wanted more of a stretch   Exercise #2: lat stretch at countertop/sink - hold 30 sec x 2, verbally reviewed  Comment #2: seated QL stretch with lumbar flexion bias - hold 30 sec x 2  Exercise #3: sidelying reach/roll - 10 reps   Comment #3: UBE x 3 minutes  Exercise #4: Rotary Torso 90\" 30# started to the R  Comment #4: Orange band row and extension - hold 5 sec x 15, extension completed today    Cervical MedX Initial testing (19) 4-week Re-test   AROM (full= 0-120  cervical) 0-96    Max Extension Torque  204#    Flex: ext ratio (ideal 1.4:1) 1.45:1        Enter Week / Visit #: wk 2 v 2  Weight (lbs): 123#  Reps (#): 25  Time: 129 s  ROM (degrees): 0-96  Pain: no worse  Flex:Ext ratio: 1.45:1        Treatment Today     TREATMENT MINUTES COMMENTS   Evaluation     Self-care/ Home management     Manual therapy 15 MFR R>L upper trap, levators, " supraspinatus and cervical paraspinals    Neuromuscular Re-education     Therapeutic Activity     Therapeutic Exercises 15 See flow sheet  cervical DE and rotary torso  Added Tband row/extension  Discussed median nerve glide, should review next session   Gait training     Modality__________________                Total 30    Blank areas are intentional and mean the treatment did not include these items.       Debby Zhou  5/16/2019

## 2021-05-29 NOTE — PROGRESS NOTES
[facility] Discharge Summary  Patient Name: Lia Lemon  Date: 7/25/2019  Referral Diagnosis: neck and myofascial pain  Referring provider: Alicia Kenney MD  Visit Diagnosis:   1. Cervicalgia     2. Chronic scapular pain     3. Poor posture     4. Myofascial pain         Goals:  Pt. will be independent with home exercise program in : 4 weeks  Pt. will have improved quality of sleep: with less pain;waking less times/night;in 6 weeks  Pt. will improve posture : and demonstrate posture with minimal to no cuing;in sitting;in standing;30 minutes;for working;in 6 weeks  Patient will sit: 10 minutes;for work;for driving;with no pain;with less difficultty;in 6 weeks    Pt will: demonstrate above average strength for all ROM on MedX by 8 weeks.  Pt will: report decreased instances to 1x/week of pain symptoms by 6 weeks.       Patient was seen for 5 visits for physical therapy of neck and myofascial pain from 5/2/19 to 5/23/19 with no follow up appointments.   The patient was instructed to follow up with physician's clinic.  The patient discontinued therapy, did not return.  No further therapy is required at this time.    Therapy will be discontinued at this time.  The patient will need a new referral to resume physical therapy treatment. Please see below for patient's current status.    Thank you for your referral.  Stacey Saucedo, PT, DPT  7/25/2019   9:49 AM      [facility] Daily Progress     Patient Name: Lia Lemon  Date: 5/23/2019   Initial Eval: 5/2/19  Visit #: 5/13  Referral Diagnosis: neck pain and myofascial pain  Referring provider: Dr. Vinh Guzman  Visit Diagnosis:     ICD-10-CM    1. Cervicalgia M54.2    2. Chronic scapular pain M89.8X1     G89.29    3. Poor posture R29.3    4. Myofascial pain M79.18          Assessment:     Patient is currently in the 3rd week of the MedX program -however held off on MedX today due to increased pain - PT educated patient to follow up  with referring provider. PT encouraged compliance with the scapular resistance exercises and posture checks. PT continued with MedX and Manual therapy this visit.    HEP/POC compliance is  fair .  Patient is appropriate to continue with skilled physical therapy intervention, as indicated by initial plan of care.    Goal Status:  Pt. will be independent with home exercise program in : 4 weeks  Pt. will have improved quality of sleep: with less pain;waking less times/night;in 6 weeks  Pt. will improve posture : and demonstrate posture with minimal to no cuing;in sitting;in standing;30 minutes;for working;in 6 weeks  Patient will sit: 10 minutes;for work;for driving;with no pain;with less difficultty;in 6 weeks    Pt will: demonstrate above average strength for all ROM on MedX by 8 weeks.  Pt will: report decreased instances to 1x/week of pain symptoms by 6 weeks.     From initial plan of care:  Patient is a 39 y.o. female that presents with signs and symptoms consistent with R>L cervical and scapular neck pain secondary to possible thoracic joint stiffness, scapular muscle instability/weakness, chiari malformation and/or weight of her breasts causing her increased pain symptoms - all could be contributing factors to patient's pain symptoms. Patient demonstrates impairments including decreased thoracic joint mobility and flexibility, with poor postural strength and awareness, and weak core (has given birth to 5 children, most recent 2016) leading to impaired functional mobility. Patient's functional limitations include rolling over in bed, sitting for longer than 10 minutes, sleeping comfortable at night, turning her head in all directions, and performing daily work/house duties.     Plan / Patient Education:     Continue with initial plan of care.  Progress with home program as tolerated.    PLAN for next visit: MedX,  scapular strengthening, possible use of Ktape or TENS unit?, core strengthening and postural review,  "manual cervical traction and MFR to scapular musculature, eventual reformer - test for possible TOS?    Subjective:     Pain Ratin, most relief when she is laying flat.   Neck has been uncomfortable every day, makes her wonder if it is the chiari getting irritated, it has been pretty bad everyday for the last week. Uncomfortable in her neck and it goes down the spine, it sometimes cracks. Patient will be in [place] and [place] this next week so it will be interesting to see what happens.     Pt and patient discussed this to be normal.    Objective:     Exercises: (see flow sheet for date performed)  Exercise #1: supine chin tuck - hold 5 sec x 10  Comment #1: supine pec stretch with breathing - hold 60 seconds, changed to standing unilateral at wall, ezequieltent wanted more of a stretch   Exercise #2: lat stretch at countertop/sink - hold 30 sec x 2, verbally reviewed  Comment #2: seated QL stretch with lumbar flexion bias - hold 30 sec x 2  Exercise #3: sidelying reach/roll - 10 reps   Comment #3: UBE x 3 minutes  Exercise #4: Rotary Torso 90\" 30# started to the R  Comment #4: Orange band row and extension - hold 5 sec x 15, extension completed today    Cervical MedX Initial testing (19) 4-week Re-test   AROM (full= 0-120  cervical) 0-96    Max Extension Torque  204#    Flex: ext ratio (ideal 1.4:1) 1.45:1        Enter Week / Visit #: wk 2 v 2  Weight (lbs): 123#  Reps (#): 25  Time: 129 s  ROM (degrees): 0-96  Pain: no worse  Flex:Ext ratio: 1.45:1        Treatment Today     TREATMENT MINUTES COMMENTS   Evaluation     Self-care/ Home management     Manual therapy 25 MFR R>L upper trap, levators, supraspinatus and cervical paraspinals    Neuromuscular Re-education     Therapeutic Activity     Therapeutic Exercises 5 See flow sheet  cervical DE and rotary torso  Added Tband row/extension  Discussed median nerve glide, should review next session   Gait training     Modality__________________              "   Total 30    Blank areas are intentional and mean the treatment did not include these items.       Stacey Saucedo  5/23/2019

## 2021-05-30 VITALS — BODY MASS INDEX: 38.94 KG/M2 | WEIGHT: 234 LBS

## 2021-05-30 VITALS — WEIGHT: 249 LBS | BODY MASS INDEX: 42.08 KG/M2

## 2021-05-30 VITALS — WEIGHT: 253.5 LBS | HEIGHT: 65 IN | BODY MASS INDEX: 42.24 KG/M2

## 2021-05-30 VITALS — WEIGHT: 239.8 LBS | BODY MASS INDEX: 39.9 KG/M2

## 2021-05-30 NOTE — PROGRESS NOTES
I met with Lia Lemon at the request of EDA to recheck her blood pressure.  Blood pressure medications on the MAR were reviewed with patient.    Patient has taken all medications as per usual regimen: Yes STARTED HCTZ 2 WEEKS AGO   Patient reports tolerating them without any issues or concerns: Yes    Vitals:    07/16/19 1554   BP: 130/82   Pulse: 90   SpO2: 100%       Blood pressure was taken, previous encounter was reviewed, recorded blood pressure below 140/90.  Patient was discharged and the note will be sent to the provider for final review.

## 2021-05-30 NOTE — PROGRESS NOTES
ASSESSMENT AND PLAN:     Problem List Items Addressed This Visit        High    Class 2 obesity with alveolar hypoventilation, serious comorbidity, and body mass index (BMI) of 36.0 to 36.9 in adult (H)     Dx: Initial BMI 42.18 with comorbid weight related conditions including chronic pain, hyperlipidemia, and vitamin D deficiency.  She also appears to have severe binge eating disorder.     Initial Weight: 253.5 lbs bmi 42.18, 3/30/2017  Weight: 172 lb (78 kg) bmi 29.52, 5/23/2018   Weight: 178 lb (80.7 kg) bmi 30.55 7/24/2018   Weight: 204 lb (92.5 kg) Body mass index is 35.02 kg/m . 12/13/2018   Weight: 201 lb (91.2 kg) bmi 34.50, 1/16/2019   Weight: 200 lb 11.2 oz (91 kg) bmi 34.45, 2/14/2019   Weight: 206 lb 8 oz (93.7 kg) , bmi 35.45, 4/9/2019   Weight: 211 lb 1.6 oz (95.8 kg), bmi 36.24, 7/9/2019   Weight loss from initial: 42.4  % Weight loss: 16.73 %       PHENTERMINE 37.5 MG PO Q DAY  3/30/2018 -1/24/2018 - 73 LB DOWN     PHENTERMINE 37.5MG PO Q DAY 5/7 D PER WEEK  1/24/2018 - 5/23/2018 - 8 LBS ADDITIONALLY DOWN (TOTAL 81.5 LB DOWN)  Starting to get difficult now, so will decrease dose 5/23/2018      PHENTERMINE 37.5 MG PO 3/7 D PER WEEK  5/23/2018 - 7/24/2018 - gained 6 lbs.   Dc phentermine 7/24/2018       CONTRAVE 2 PILLS two times a day (START SLOWLY)  7/24/2018 - - SELF DISCONTINUED.       Vyvanse 30 MG orally per morning-  indication-  severe binge eating disorder.   12/13/2018 - 1/16/2019 - she finds it helpful.   1/16/2019 INCREASED DOSE     VYVANSE 30 MG PO two times a day  1/16/2019 - INCREASED DOSE FROM 30 MG PO Q DAY.  Watch pvcs.     LIRAGLUTIDE -  1/16/2019 - DISCUSSED.        PLAN  WEIGHT MAINTENANCE PHASE - (BMI down from 42-34, but now starting to regain and bmi is up to 36)     CAP PLAN   Less than or equal to 213 lbs- control  Between 214-216 lbs - action  Greater than or equal to 217 - panic      She is frustrated today with weight regain.  She would like to restart stimulant  medication.  Phentermine worked for her in the past and she wants to restart that.   Discussed saxenda and belviq, she declined both now and although I had recommended she look these up she has not done that yet.  At this point she would like to get her blood pressure under control and restart phentermine.  Her blood pressure was still elevated today at 138/90 and so we will wait to see if the hydrochlorothiazide 12.5 mg dose that she started a week ago starts to kick in.  She will schedule a nurse visit and if her blood pressure is improved we can restart phentermine.     Given that her initial BMI was 42 and she has comorbidities including hyperlipidemia she may be a candidate for weight loss surgery.  I did offer up the option of watching a video learning more about weight loss surgery but she declined.     Goal this month: monitor bp.  Get blood pressure under control and consider restarting phentermine.     Labs: 1/16/19-normal CBC and A1c is 5.1/improved, d 22.9, tsh nl, cmp nl, lipids normal (hdl high)     Vit d is 22 - recommend taking 5000 iu daily.      Follow up in 2-4 weeks to recheck bp and weight.                 Chief Complaint   Patient presents with     Weight Loss        HPI  Lia Lemon is a 39 y.o. female comes in for weight loss follow up.    Is here today wanting to restart her phentermine because she notices her weight continues to slowly creep up without any medications.  We had to discontinue her phentermine/Vyvanse because of elevated blood pressure and have not restarted it due to the increased blood pressure.  She is been trying some essential oils at home but that did not help.  1 week ago she went saw her primary care physician and started hydrochlorothiazide 12.5 mg orally per day and at home she is getting blood pressures between 110-120/70-80 and at [facility] she got a blood pressure of 122/84.    Over the July 4 holiday she has been eating a lot more and feels that may be her  "recent indulgences have contributed to her elevated blood pressure today.    Social History     Tobacco Use   Smoking Status Never Smoker   Smokeless Tobacco Never Used      Current Outpatient Medications on File Prior to Visit   Medication Sig Dispense Refill     cholecalciferol, vitamin D3, (VITAMIN D3) 2,000 unit Tab Take 2000 IU daily  0     hydroCHLOROthiazide (MICROZIDE) 12.5 mg capsule Take 1 capsule (12.5 mg total) by mouth daily. 30 capsule 0     multivit-min/ferrous fumarate (MULTI VITAMIN ORAL) Take by mouth.       OMEGA-3/DHA/EPA/FISH OIL (FISH OIL-OMEGA-3 FATTY ACIDS) 300-1,000 mg capsule Take 2 capsules (2 g total) by mouth daily.  0     No current facility-administered medications on file prior to visit.       Allergies   Allergen Reactions     Morphine Hives and Itching     Can take vicodin and percocet         Review of Systems   Constitutional: Negative.    HENT: Negative.    Eyes: Negative.    Respiratory: Negative.    Cardiovascular: Negative.    Gastrointestinal: Negative.    Endocrine: Negative.    Genitourinary: Negative.    Musculoskeletal: Negative.    Skin: Negative.    Neurological: Negative.    Hematological: Negative.    Psychiatric/Behavioral: Negative.         OBJECTIVE: /90 (Patient Site: Left Arm, Patient Position: Sitting, Cuff Size: Adult Regular)   Pulse 74   Ht 5' 4\" (1.626 m)   Wt 211 lb 1.6 oz (95.8 kg)   BMI 36.24 kg/m     Physical Exam   Constitutional: She is oriented to person, place, and time. She appears well-developed and well-nourished. No distress.   HENT:   Head: Normocephalic and atraumatic.   Eyes: Conjunctivae are normal.   Neck: Neck supple.   Cardiovascular: Normal rate and regular rhythm.   Pulmonary/Chest: Effort normal.   Musculoskeletal: Normal range of motion.   Neurological: She is alert and oriented to person, place, and time.   Skin: Skin is warm and dry.   Psychiatric: She has a normal mood and affect.        This note was created using Dragon " dictation.  Please excuse any grammatical errors.

## 2021-05-30 NOTE — PROGRESS NOTES
1. Essential hypertension  hydroCHLOROthiazide (MICROZIDE) 12.5 mg capsule       ASSESSMENT/PLAN:     The following high BMI interventions were performed this visit: encouragement to exercise and weight monitoring    1. Essential hypertension    - hydroCHLOROthiazide (MICROZIDE) 12.5 mg capsule; Take 1 capsule (12.5 mg total) by mouth daily.  Dispense: 30 capsule; Refill: 0      There are no Patient Instructions on file for this visit.  Medications Discontinued During This Encounter   Medication Reason     amitriptyline (ELAVIL) 10 MG tablet Non-compliance     lisdexamfetamine (VYVANSE) 30 MG capsule Non-compliance     Return in about 2 weeks (around 7/11/2019) for hypertension.    .    Sanjana Botello NP          SUBJECTIVE:  Lia Lemon is a 39 y.o. female who presents for evaluation of her elevated blood pressure.  I have seen the patient in clinic a few times since February of this year, during both visits, she did have borderline high systolic but has always had borderline/elevated diastolic pressures.  She is asymptomatic in regards to her elevated blood pressure readings. Denies palpitations, CP, shortness of breath, HA.  She does check her blood pressure at home and typically has a systolic pressure between 130-140 with diastolic of .  There is a family history of heart disease and hypertension with her father.  He did start blood pressure medication when he was in his 20s.  She does not exercise regularly, they do have a treadmill, she is planning on starting soon.  She is a non-smoker, she does drink 1 glass of wine several days a week.  She does not add salt to her diet, she does drink 1 can of soda daily at least 5 days out of the week, she does not typically eat much fast food.  She was previously being seen by the obesity clinic, she was on Contrave and Vyvanse, however, they would not refill her medication due to her elevated blood pressure readings.  She was recently seen by the spine  "clinic and had been attending physical therapy for her ongoing neck and back pain issues, however, she did not start the amitriptyline that they prescribed to her because \"I do not like to take a lot of pills \".  She does notice lower extremity swelling at times intermittently when she stands for long periods of time, she does have a sit to stand desk at work but admits she is typically on her feet most of the day.  Her weight does bother her, she did not want to know what her weight was today.  I did not tell her what she weighed but I did inform her that she has gained 30 pounds since last May which could be contributing to her elevated blood pressure readings today.  We did discuss treatment options for her blood pressure, she has agreed to start a low-dose diuretic daily.   Chief Complaint   Patient presents with     Hypertension     BP running high 130/140 -90/100         Patient Active Problem List   Diagnosis     Palpitations     Abdominal Pain     Joint pain localized in the shoulder      Vaginal delivery     Class 2 obesity with alveolar hypoventilation, serious comorbidity, and body mass index (BMI) of 35.0 to 35.9 in adult (H)     Chronic pain     Vitamin D deficiency disease     Hyperlipidemia     Carpal tunnel syndrome of right wrist     Binge-eating disorder, severe     Elevated blood pressure reading without diagnosis of hypertension     Pain of right scapula     Acute midline thoracic back pain       Current Outpatient Medications   Medication Sig Dispense Refill     cholecalciferol, vitamin D3, (VITAMIN D3) 2,000 unit Tab Take 2000 IU daily  0     OMEGA-3/DHA/EPA/FISH OIL (FISH OIL-OMEGA-3 FATTY ACIDS) 300-1,000 mg capsule Take 2 capsules (2 g total) by mouth daily.  0     hydroCHLOROthiazide (MICROZIDE) 12.5 mg capsule Take 1 capsule (12.5 mg total) by mouth daily. 30 capsule 0     No current facility-administered medications for this visit.        Social History     Tobacco Use   Smoking Status " Never Smoker   Smokeless Tobacco Never Used       REVIEW OF SYSTEMS: Denies radiation, diaphoresis, shortness of breath, dizziness, syncope, nausea, palpitations, and associated with activity.       TOBACCO USE:  Social History     Tobacco Use   Smoking Status Never Smoker   Smokeless Tobacco Never Used     Social History     Socioeconomic History     Marital status:      Spouse name: Macario     Number of children: 5     Years of education: Not on file     Highest education level: Not on file   Occupational History     Occupation: RN Manager     Comment: [employer]   Social Needs     Financial resource strain: Not on file     Food insecurity:     Worry: Not on file     Inability: Not on file     Transportation needs:     Medical: Not on file     Non-medical: Not on file   Tobacco Use     Smoking status: Never Smoker     Smokeless tobacco: Never Used   Substance and Sexual Activity     Alcohol use: Yes     Alcohol/week: 3.0 oz     Types: 5 Standard drinks or equivalent per week     Comment: occas.     Drug use: No     Sexual activity: Yes     Partners: Male     Birth control/protection: Condom   Lifestyle     Physical activity:     Days per week: Not on file     Minutes per session: Not on file     Stress: Not on file   Relationships     Social connections:     Talks on phone: Not on file     Gets together: Not on file     Attends Tenriism service: Not on file     Active member of club or organization: Not on file     Attends meetings of clubs or organizations: Not on file     Relationship status: Not on file     Intimate partner violence:     Fear of current or ex partner: Not on file     Emotionally abused: Not on file     Physically abused: Not on file     Forced sexual activity: Not on file   Other Topics Concern     Not on file   Social History Narrative     Not on file         OBJECTIVE:            Vitals:    06/27/19 1206   BP: (!) 130/96   Pulse:    Resp:    Temp:    SpO2:      Weight: 206 lb  (93.4 kg)    Wt Readings from Last 3 Encounters:   06/27/19 206 lb (93.4 kg)   04/09/19 206 lb 8 oz (93.7 kg)   02/14/19 200 lb 11.2 oz (91 kg)     Body mass index is 35.36 kg/m .        Physical Exam:  GENERAL APPEARANCE: A&A, NAD, well hydrated, well nourished  NECK: Supple, without lymphadenopathy, no thyroid mass, no JVD, no bruit  CV: RRR, no M/G/R, no edema, bilateral posttibial pulses are equal and palpable, 2+  LUNGS: CTAB, normal respiratory effort  ABDOMEN: S&NT, no masses, no organomegaly, BS present x4   SKIN:  Normal skin turgor, no lesions/rashes, warm and dry   NEURO: no gross deficits

## 2021-05-30 NOTE — PROGRESS NOTES
ASSESSMENT AND PLAN:     Problem List Items Addressed This Visit        High    Class 2 obesity with alveolar hypoventilation, serious comorbidity, and body mass index (BMI) of 35.0 to 35.9 in adult (H)     Dx: Initial BMI 42.18 with comorbid weight related conditions including chronic pain, hyperlipidemia, and vitamin D deficiency.  She also appears to have severe binge eating disorder.     Initial Weight: 253.5 lbs bmi 42.18, 3/30/2017  Weight: 172 lb (78 kg) bmi 29.52, 5/23/2018   Weight: 178 lb (80.7 kg) bmi 30.55 7/24/2018   Weight: 204 lb (92.5 kg) Body mass index is 35.02 kg/m . 12/13/2018   Weight: 201 lb (91.2 kg) bmi 34.50, 1/16/2019   Weight: 200 lb 11.2 oz (91 kg) bmi 34.45, 2/14/2019   Weight: 206 lb 8 oz (93.7 kg) , bmi 35.45, 4/9/2019   Weight: 211 lb 1.6 oz (95.8 kg), bmi 36.24, 7/9/2019   Weight: 207 lb 6.4 oz (94.1 kg), bmi 35.60, 7/23/2019   Weight loss from initial: 46.1  % Weight loss: 18.19 %     PHENTERMINE 37.5 MG PO Q DAY  3/30/2018 -1/24/2018 - 73 LB DOWN     PHENTERMINE 37.5MG PO Q DAY 5/7 D PER WEEK  1/24/2018 - 5/23/2018 - 8 LBS ADDITIONALLY DOWN (TOTAL 81.5 LB DOWN)  Starting to get difficult now, so will decrease dose 5/23/2018      PHENTERMINE 37.5 MG PO 3/7 D PER WEEK  5/23/2018 - 7/24/2018 - gained 6 lbs.   Dc phentermine 7/24/2018       CONTRAVE 2 PILLS two times a day (START SLOWLY)  7/24/2018 - - SELF DISCONTINUED.       Vyvanse 30 MG orally per morning-  indication-  severe binge eating disorder.   12/13/2018 - 1/16/2019 - she finds it helpful.   1/16/2019 INCREASED DOSE     VYVANSE 30 MG PO two times a day  1/16/2019 - INCREASED DOSE FROM 30 MG PO Q DAY.  Watch pvcs.     LIRAGLUTIDE -  1/16/2019 - DISCUSSED.        PLAN  WEIGHT MAINTENANCE PHASE - (BMI down from 42-34, but now starting to regain and bmi is up to 35)     CAP PLAN   Less than or equal to 209lbs- control  Between 210-211 lbs - action  Greater than or equal to 212 - panic      She is frustrated today with weight  regain.  She would like to restart stimulant medication.  Phentermine worked for her in the past and she wants to restart that.  She is not interested in Saxenda or Belviq.  Her blood pressure was still elevated today at 132/98 despite taking hctz 12.5mg po q day x 3 weeks.      Given that her initial BMI was 42 and she has comorbidities including hyperlipidemia she may be a candidate for weight loss surgery.  I did offer up the option of watching a video learning more about weight loss surgery but she declined.     Goal this month: Increase from 12.5 to hydrochlorothiazide 25 mg orally per day, bring in blood pressure cuff next time to calibrate, consider restarting phentermine once her blood pressure is controlled.  We do need a BMP at follow-up.     Labs: 1/16/19-normal CBC and A1c is 5.1/improved, d 22.9, tsh nl, cmp nl, lipids normal (hdl high)     Vit d is 22 - recommend taking 5000 iu daily.      Follow up in 2-4 weeks to recheck bp and weight.             Unprioritized    Essential hypertension - Primary     BP Readings from Last 3 Encounters:   07/23/19 (!) 138/94   07/16/19 130/82   07/09/19 138/90     She says she is taking hctz 12.5mg po q day but bp still high today.   Of note she says she took her blood pressure at home this morning it was 108/82.  She is never had her home blood pressure cuff calibrated but she plans to bring it in next time to have that done.  She also had a diet Mountain Dew today so possibly that is elevating her blood pressure at this time.  She is really wanting to get back on the phentermine but because of the elevated blood pressure I do not feel comfortable at this time.  She says she is never getting low blood pressures and does not feel hypotensive or lightheaded at any time.  For this reason I did offer to increase her hydrochlorothiazide.  We will start hydrochlorthiazide 25 mrem orally per day.    Follow-up in 2 weeks for blood pressure check and medication titration and  "consideration of starting phentermine.    Continue weight loss efforts.         Relevant Medications    hydroCHLOROthiazide (HYDRODIURIL) 25 MG tablet      Other Visit Diagnoses     Encounter for therapeutic drug monitoring        Relevant Orders    Basic Metabolic Panel           Chief Complaint   Patient presents with     Weight Loss     blood pressure running high        HPI  Lia Lemon is a 39 y.o. female comes in for weight loss follow up.    She has been working on portion control.   She was hoping that she can get back on the phentermine today.    Social History     Tobacco Use   Smoking Status Never Smoker   Smokeless Tobacco Never Used      Current Outpatient Medications on File Prior to Visit   Medication Sig Dispense Refill     cholecalciferol, vitamin D3, (VITAMIN D3) 2,000 unit Tab Take 2000 IU daily  0     multivit-min/ferrous fumarate (MULTI VITAMIN ORAL) Take by mouth.       OMEGA-3/DHA/EPA/FISH OIL (FISH OIL-OMEGA-3 FATTY ACIDS) 300-1,000 mg capsule Take 2 capsules (2 g total) by mouth daily.  0     [DISCONTINUED] hydroCHLOROthiazide (MICROZIDE) 12.5 mg capsule Take 1 capsule (12.5 mg total) by mouth daily. 30 capsule 0     No current facility-administered medications on file prior to visit.       Allergies   Allergen Reactions     Morphine Hives and Itching     Can take vicodin and percocet       Review of Systems   Constitutional: Negative.    HENT: Negative.    Eyes: Negative.    Respiratory: Negative.    Cardiovascular: Negative.    Gastrointestinal: Negative.    Endocrine: Negative.    Genitourinary: Negative.    Musculoskeletal: Negative.    Skin: Negative.    Neurological: Negative.    Hematological: Negative.    Psychiatric/Behavioral: Negative.         OBJECTIVE: BP (!) 132/98   Pulse 74   Ht 5' 4\" (1.626 m)   Wt 207 lb 6.4 oz (94.1 kg)   BMI 35.60 kg/m     Physical Exam   Constitutional: She is oriented to person, place, and time. She appears well-developed and well-nourished. No " distress.   HENT:   Head: Normocephalic and atraumatic.   Eyes: Conjunctivae are normal.   Neck: Neck supple.   Cardiovascular: Normal rate and regular rhythm.   Pulmonary/Chest: Effort normal.   Musculoskeletal: Normal range of motion.   Neurological: She is alert and oriented to person, place, and time.   Skin: Skin is warm and dry.   Psychiatric: She has a normal mood and affect.        This note was created using Dragon dictation.  Please excuse any grammatical errors.

## 2021-05-31 VITALS — WEIGHT: 202 LBS | BODY MASS INDEX: 34.14 KG/M2

## 2021-05-31 VITALS — HEIGHT: 65 IN | BODY MASS INDEX: 35.74 KG/M2 | WEIGHT: 214.5 LBS

## 2021-05-31 VITALS — BODY MASS INDEX: 31.11 KG/M2 | HEIGHT: 65 IN | WEIGHT: 186.7 LBS

## 2021-05-31 VITALS — WEIGHT: 189 LBS | BODY MASS INDEX: 31.49 KG/M2 | HEIGHT: 65 IN

## 2021-05-31 VITALS — WEIGHT: 212.4 LBS | BODY MASS INDEX: 35.9 KG/M2

## 2021-05-31 VITALS — BODY MASS INDEX: 31.35 KG/M2 | WEIGHT: 185.5 LBS

## 2021-05-31 VITALS — BODY MASS INDEX: 37.32 KG/M2 | WEIGHT: 224 LBS | HEIGHT: 65 IN

## 2021-05-31 NOTE — PROGRESS NOTES
ASSESSMENT AND PLAN:     Problem List Items Addressed This Visit        High    Class 2 obesity with alveolar hypoventilation, serious comorbidity, and body mass index (BMI) of 35.0 to 35.9 in adult (H) - Primary     Dx: Initial BMI 42.18 with comorbid weight related conditions including chronic pain, hyperlipidemia, and vitamin D deficiency.  She also appears to have severe binge eating disorder.     Initial Weight: 253.5 lbs bmi 42.18, 3/30/2017  Weight: 172 lb (78 kg) bmi 29.52, 5/23/2018   Weight: 178 lb (80.7 kg) bmi 30.55 7/24/2018   Weight: 204 lb (92.5 kg) Body mass index is 35.02 kg/m . 12/13/2018   Weight: 201 lb (91.2 kg) bmi 34.50, 1/16/2019   Weight: 200 lb 11.2 oz (91 kg) bmi 34.45, 2/14/2019   Weight: 206 lb 8 oz (93.7 kg) , bmi 35.45, 4/9/2019   Weight: 211 lb 1.6 oz (95.8 kg), bmi 36.24, 7/9/2019   Weight: 207 lb 6.4 oz (94.1 kg), bmi 35.60, 7/23/2019   Weight: 205 lb (93 kg), bmi 35.19, 8/11/2019   Weight loss from initial: 48.5  % Weight loss: 19.13 %       PHENTERMINE 37.5 MG PO Q DAY  3/30/2018 -1/24/2018 - 73 LB DOWN     PHENTERMINE 37.5MG PO Q DAY 5/7 D PER WEEK  1/24/2018 - 5/23/2018 - 8 LBS ADDITIONALLY DOWN (TOTAL 81.5 LB DOWN)  Dose decreased 5/2018    PHENTERMINE 37.5 MG PO 3/7 D PER WEEK  5/23/2018 - 7/24/2018 - gained 6 lbs.   Dc phentermine 7/24/2018       CONTRAVE 2 PILLS two times a day (START SLOWLY)  7/24/2018 - - SELF DISCONTINUED.     NALOXONE  8/9/19 - start naloxone (plan to add wellbutrin if desired in future to simulate contrave with less cost).      Vyvanse 30 MG orally per morning-  indication-  severe binge eating disorder.   12/13/2018 - 1/16/2019 - she finds it helpful.   1/16/2019 INCREASED DOSE     VYVANSE 30 MG PO two times a day  1/16/2019 - INCREASED DOSE FROM 30 MG PO Q DAY.  Watch pvcs.     LIRAGLUTIDE -  1/16/2019 - DISCUSSED.        PLAN  WEIGHT MAINTENANCE PHASE - (BMI down from 42-34, but now starting to regain and bmi is up to 35).  CURRENTLY NOT INTERESTED  IN 24 WEEK PROGRAM AND NOT INTERESTED IN BARIATRIC SURGERY.     CAP PLAN   Less than or equal to 209lbs- control  Between 210-211 lbs - action  Greater than or equal to 212 - panic      Start naloxone    She is frustrated today with weight and difficulty losing more.  She would like to restart stimulant medication.  Phentermine worked for her in the past and she wants to restart that.  She is not interested in Saxenda or Belviq.  Her blood pressure is making it difficult to restart stimulant as it seems to keep going up despite taking hctz 25 mg po q day. See htn in prob list.      Given that her initial BMI was 42 and she has comorbidities including hyperlipidemia she may be a candidate for weight loss surgery.  I did offer up the option of watching a video in the past, to learn more about weight loss surgery but she declined.     Goal this month: see pcp to work on chronic back pain and htn. Start naloxone (didn't like contrave due to cost) so could try generic naloxone and add wellbutrin as needed for compulsive eating)     Labs: 1/16/19-normal CBC and A1c is 5.1/improved, d 22.9, tsh nl, cmp nl, lipids normal (hdl high)     Vit d is 22 - recommend taking 5000 iu daily.     HYPERTENSION - WORSENING  BP Readings from Last 3 Encounters:   08/09/19 (!) 144/94   07/23/19 (!) 132/98   07/16/19 130/82     Her blood pressure continues to be an issue and seems to be rising despite the addition of hctz 12.5 on 6/27/19 and increased to 25 mg on 7/23/19.     For weight loss for now I am avoiding stimulants and have suggested adding another medication, we discussed lisinopril today or follow up with PCP. She wants to make appt w/ PCP and I think this is a good idea. I have recommended she also bring in her home bp cuff to calibrate because she reports home bp is always normal.  She is dealing with a lot of back and neck pain and not sleeping well because of it and her stress levels are increased. All of this could be  contributing to elevated bp and to increased weight.     CHRONIC NECK AND BACK PAIN  Worsening - clearly having a lot of discomfort today, constantly moving and trying to get comfortable. Recommend fu w PCP. She continues to maintain a 45+ lb weight loss since 2017 and wants to lose more       Follow up in in 1-3 months and try to work with PCP to get bp under control in the meantime.            Relevant Medications    naltrexone (DEPADE) 50 mg tablet       Unprioritized    Chronic pain (Chronic)     See back pain in prob list.          Hyperlipidemia (Chronic)     STABLE. Continue weight loss efforts.          Vitamin D deficiency disease     Vit D was 22 in past and will recheck now as she says she is taking her supplement.   Order has been placed.          Relevant Orders    Vitamin D, Total (25-Hydroxy)    Essential hypertension     BP Readings from Last 3 Encounters:   08/09/19 (!) 144/94   07/23/19 (!) 132/98   07/16/19 130/82   WORSENING despite weight loss and continued attempts at weight loss.   Her blood pressure continues to be an issue and seems to be rising despite the addition of hctz 12.5 on 6/27/19 and increased to 25 mg on 7/23/19.     For weight loss for now I am avoiding stimulants and have suggested adding another medication, we discussed lisinopril today or follow up with PCP. She wants to make appt w/ PCP and I think this is a good idea. I have recommended she also bring in her home bp cuff to calibrate because she reports home bp is always normal.  She is dealing with a lot of back and neck pain and not sleeping well because of it and her stress levels are increased. All of this could be contributing to elevated bp and to increased weight.          Acute midline thoracic back pain     Worsening - clearly having a lot of discomfort today, constantly moving and trying to get comfortable. Recommend fu w PCP. She continues to maintain a 45+ lb weight loss since 2017 and wants to lose more           "       Chief Complaint   Patient presents with     Weight Loss        HPI  Lia Lemon is a 39 y.o. female comes in for weight loss follow up.  She says she has not been sleeping well because of back pain and she is under a lot of stress because she is continually uncomfortable and nothing seems to be helping.  She notices that she constantly grazing and eating despite not particularly feeling hungry.    Social History     Tobacco Use   Smoking Status Never Smoker   Smokeless Tobacco Never Used      Current Outpatient Medications on File Prior to Visit   Medication Sig Dispense Refill     cholecalciferol, vitamin D3, (VITAMIN D3) 2,000 unit Tab Take 2000 IU daily  0     hydroCHLOROthiazide (HYDRODIURIL) 25 MG tablet Take 1 tablet (25 mg total) by mouth daily. 30 tablet 0     multivit-min/ferrous fumarate (MULTI VITAMIN ORAL) Take by mouth.       OMEGA-3/DHA/EPA/FISH OIL (FISH OIL-OMEGA-3 FATTY ACIDS) 300-1,000 mg capsule Take 2 capsules (2 g total) by mouth daily.  0     No current facility-administered medications on file prior to visit.       Allergies   Allergen Reactions     Morphine Hives and Itching     Can take vicodin and percocet       Review of Systems   Constitutional: Negative.    HENT: Negative.    Eyes: Negative.    Respiratory: Negative.    Cardiovascular: Negative.    Gastrointestinal: Negative.    Endocrine: Negative.    Genitourinary: Negative.    Musculoskeletal: Negative.    Skin: Negative.    Neurological: Negative.    Hematological: Negative.    Psychiatric/Behavioral: Negative.         OBJECTIVE: BP (!) 144/94   Pulse 84   Resp 12   Ht 5' 4\" (1.626 m)   Wt 205 lb (93 kg)   BMI 35.19 kg/m     Physical Exam   Constitutional: She is oriented to person, place, and time. She appears well-developed and well-nourished. No distress.   HENT:   Head: Normocephalic and atraumatic.   Eyes: Conjunctivae are normal.   Neck: Neck supple.   Cardiovascular: Normal rate and regular rhythm. "   Pulmonary/Chest: Effort normal.   Musculoskeletal: Normal range of motion.   Neurological: She is alert and oriented to person, place, and time.   Skin: Skin is warm and dry.   Psychiatric: She has a normal mood and affect.      Additional History from Old Records Summarized (2): no  Decision to Obtain Records (1): no  Radiology Tests Summarized or Ordered (1): no  Labs Reviewed or Ordered (1): yes  Medicine Test Summarized or Ordered (1): yes  Independent Review of EKG or X-RAY(2 each): no      This note was created using Dragon dictation.  Please excuse any grammatical errors.

## 2021-05-31 NOTE — TELEPHONE ENCOUNTER
Refill Approved    Rx renewed per Medication Renewal Policy. Medication was last renewed on 7/23/19.    Rosa Ortiz, [facility] Triage/Med Refill 8/22/2019     Requested Prescriptions   Pending Prescriptions Disp Refills     hydroCHLOROthiazide (HYDRODIURIL) 25 MG tablet [Pharmacy Med Name: HYDROCHLOROTHIAZIDE 25 MG TAB] 30 tablet 0     Sig: TAKE 1 TABLET BY MOUTH EVERY DAY       Diuretics/Combination Diuretics Refill Protocol  Passed - 8/22/2019  2:17 AM        Passed - Visit with PCP or prescribing provider visit in past 12 months     Last office visit with prescriber/PCP: 8/9/2019 Faustina Cline MD OR same dept: 8/9/2019 Faustina Cline MD OR same specialty: 8/9/2019 Faustina Cline MD  Last physical: Visit date not found Last MTM visit: Visit date not found   Next visit within 3 mo: Visit date not found  Next physical within 3 mo: Visit date not found  Prescriber OR PCP: Faustina Cline MD  Last diagnosis associated with med order: 1. Elevated blood pressure reading without diagnosis of hypertension  - hydroCHLOROthiazide (HYDRODIURIL) 25 MG tablet [Pharmacy Med Name: HYDROCHLOROTHIAZIDE 25 MG TAB]; TAKE 1 TABLET BY MOUTH EVERY DAY  Dispense: 30 tablet; Refill: 0    If protocol passes may refill for 12 months if within 3 months of last provider visit (or a total of 15 months).             Passed - Serum Potassium in past 12 months      Lab Results   Component Value Date    Potassium 4.6 01/16/2019             Passed - Serum Sodium in past 12 months      Lab Results   Component Value Date    Sodium 139 01/16/2019             Passed - Blood pressure on file in past 12 months     BP Readings from Last 1 Encounters:   08/09/19 (!) 144/94             Passed - Serum Creatinine in past 12 months      Creatinine   Date Value Ref Range Status   01/16/2019 0.71 0.60 - 1.10 mg/dL Final

## 2021-06-01 ENCOUNTER — RECORDS - HEALTHEAST (OUTPATIENT)
Dept: ADMINISTRATIVE | Facility: CLINIC | Age: 41
End: 2021-06-01

## 2021-06-01 VITALS — HEIGHT: 65 IN | BODY MASS INDEX: 30.82 KG/M2 | WEIGHT: 185 LBS

## 2021-06-01 VITALS — BODY MASS INDEX: 30.39 KG/M2 | WEIGHT: 178 LBS | HEIGHT: 64 IN

## 2021-06-01 VITALS — BODY MASS INDEX: 28.91 KG/M2 | WEIGHT: 168.44 LBS

## 2021-06-01 VITALS — BODY MASS INDEX: 29.37 KG/M2 | WEIGHT: 172 LBS | HEIGHT: 64 IN

## 2021-06-01 VITALS — BODY MASS INDEX: 29.91 KG/M2 | WEIGHT: 177 LBS

## 2021-06-01 VITALS — WEIGHT: 177 LBS | BODY MASS INDEX: 29.49 KG/M2 | HEIGHT: 65 IN

## 2021-06-01 VITALS — BODY MASS INDEX: 30.35 KG/M2 | HEIGHT: 64 IN | WEIGHT: 177.8 LBS

## 2021-06-02 VITALS — HEIGHT: 64 IN | WEIGHT: 200.7 LBS | BODY MASS INDEX: 34.27 KG/M2

## 2021-06-02 VITALS — WEIGHT: 206.5 LBS | BODY MASS INDEX: 35.26 KG/M2 | HEIGHT: 64 IN

## 2021-06-02 VITALS — HEIGHT: 64 IN | BODY MASS INDEX: 34.45 KG/M2

## 2021-06-02 VITALS — HEIGHT: 64 IN | WEIGHT: 201 LBS | BODY MASS INDEX: 34.31 KG/M2

## 2021-06-02 VITALS — HEIGHT: 64 IN | WEIGHT: 204 LBS | BODY MASS INDEX: 34.83 KG/M2

## 2021-06-03 VITALS — HEIGHT: 64 IN | WEIGHT: 206 LBS | BODY MASS INDEX: 35.17 KG/M2

## 2021-06-03 VITALS — HEIGHT: 64 IN | BODY MASS INDEX: 36.04 KG/M2 | WEIGHT: 211.1 LBS

## 2021-06-03 VITALS — HEIGHT: 64 IN | BODY MASS INDEX: 35.41 KG/M2 | WEIGHT: 207.4 LBS

## 2021-06-03 VITALS — WEIGHT: 205 LBS | BODY MASS INDEX: 35 KG/M2 | HEIGHT: 64 IN

## 2021-06-05 ENCOUNTER — RECORDS - HEALTHEAST (OUTPATIENT)
Dept: LAB | Facility: CLINIC | Age: 41
End: 2021-06-05

## 2021-06-05 VITALS
HEIGHT: 65 IN | DIASTOLIC BLOOD PRESSURE: 90 MMHG | RESPIRATION RATE: 16 BRPM | BODY MASS INDEX: 40.47 KG/M2 | SYSTOLIC BLOOD PRESSURE: 138 MMHG | WEIGHT: 242.9 LBS | HEART RATE: 100 BPM

## 2021-06-05 VITALS
HEART RATE: 90 BPM | WEIGHT: 237 LBS | SYSTOLIC BLOOD PRESSURE: 132 MMHG | HEIGHT: 65 IN | BODY MASS INDEX: 39.49 KG/M2 | OXYGEN SATURATION: 99 % | DIASTOLIC BLOOD PRESSURE: 82 MMHG

## 2021-06-05 DIAGNOSIS — R10.9 ABDOMINAL PAIN: ICD-10-CM

## 2021-06-07 NOTE — TELEPHONE ENCOUNTER
Left message to call back for: Lia   Information to relay to patient:    We are reaching out to all of the former weight loss program patients to see if they are interested in re-starting the program.  We are hearing that most people are struggling right now with the current situation (working from home, gyms are closed) and we are offering to restart anyone who would like.   We are doing these as virtual video visits if they are interested.     Rosangela Melendez, URMILA 5/5/2020 3:38 PM   Holly TEJEDA

## 2021-06-08 NOTE — PROGRESS NOTES
S: Lia Lemon is a 37 year old female presenting for sore throat and GI symptoms for the past 4-5 days. She initially presented with her son who was found to be Group A strep positive and so her two other boys and herself were registered to be seen. Symptoms present for 4-5 days with mild scratching feeling in the throat. No ear pain, headaches, nasal discharge, sinus pressure or fevers. Has a small amount of loose stools and stomach upset. No vomiting. Has not been taking any medications at home. Still able to eat and drink the same amount of foods. Only allergy is morphine and has not recently been taking antibiotics.    O:  Vitals:    02/19/17 1508   BP: 108/66   Pulse: 84   Resp: 18   Temp: 98.4  F (36.9  C)   SpO2: 99%     General: Calm appearing female in no apparent distress  HEENT: Normocephalic, atraumatic, PERRL, sclera anicteric and not injected. TM flat and clear without erythema. Oral mucosa moist and intact without erythema or tonsillar exudate, no cervical lymphadenopathy  Cardio: RRR, no murmurs, rubs or gallop  Respiratory: Clear breath sounds bilaterally without wheezes or rhonchi  Abdomen: Soft, non-tender, no masses or organomegaly  Skin: No rashes or bruising    Results for orders placed or performed in visit on 02/19/17   Rapid Strep A Screen-Throat   Result Value Ref Range    Rapid Strep A Antigen No Group A Strep detected No Group A Strep detected       A/P:  1. Exposure to strep throat: Patient presenting initially with her son found to be Group A Strep positive. Has a total of 5 children at home and everybody in the family has had similar symptoms over the past 4-5 days. Rapid strep is negative. Will be testing the remainder of family members today. No antibiotics recommended for mom at this time and will wait for confirmatory testing to come back.  - Rapid Strep A Screen-Throat  - Group A Strep, RNA Direct Detection, Throat      Ria Mead MD - PGY-3

## 2021-06-09 NOTE — PROGRESS NOTES
ASSESSMENT AND PLAN:   I spent 40 minutes with the patient. 100 % of that time was spent face to face.    Dx: chronic diffuse muscle pain and bmi 42  BECAUSE OF chronic muscle pain and elevated bmi PROBLEMS IT IS STRONGLY ADVISED THAT Lia LOSE WEIGHT.  BELOW IS MY PLAN FOR her     Since Lia has morbid obesity, if conservative management does not work, could consider surgical management in the form of bariatric surgery.       Alicia Kenney MD  0- pcp  Start weight 253 lbs, 3/30/2017   Prescribed meds: phentermine   Supplements: fish oil, vit D 2000, mvi,   Goals this month: Start weight loss program, start to pay more attention to protein/ carb ratio on nutrition lables and if at all possible track diet.  Follow up monthly.    Recommended macronutrients;   Calories: 1568 daily  Protein 120 grams per day  carbs 50-75 grams per day     ______________________________________________________________________    MORE DETAILED DISCUSSION RE TODAY'S VISIT:    OBESITY  Lia  patient attended group visit to learn about obesity as a disease, an approach to treatment and metabolic factors.  Nutrition counseling reviewed.    She will start food journaling and contemplating how to increase her activity level as well.     Phentermine discussed. The risks (including addiction, erratic heart rate rhythm, insomnia, anxiety, birth defects, dry mouth, elevated blood pressure, and more) discussed.  There is only one benefit to phentermine - weight loss. She wishes to proceed.  contraception condoms.    THYROID NORMAL.     Lab Results   Component Value Date    TSH 1.84 03/30/2017      INSULIN RESISTANCE/ METABOLIC SYNDROME WORK UP NEGATIVE  Fasting blood sugar was 97.  (>100 indicative of metabolic syndrome)  Waist circumference was 53 IN Greater than 35 inches in women is indicative of metabolic syndrome. And central obesity was apparent.  Triglycerides were 82  (>150 is indicative of metabolic syndrome)  HDL 63 (<40 in  "females and <50 in males is indicative of metabolic syndrome)  bp was NORMAL (greater than 140/85 is indicative of metabolic syndrome)  When three or more of the above are present it is an indication that Metabolic syndrome is present, but it does not appear that Lia has metabolic syndrome     Her elevated waist circumference also is an indication that she  could benefit from Chromium 400 mg orally q day.  So that was prescribed as well.     VITAMIN D DEFICIENCY  Vitamin d level is low. It is 22.6 but I would like to see it closer to 60.  I recommend you start taking (or increase current intake of) over the counter vitamin D3, 2000 IU daily.  We can recheck your vitamin D level in 3 months or at your next visit.        VITAMINS   Recommend a Multivitamin, vitamin D 2000 IU per day and fish oil.     _________________________________________________________________    SUBJECTIVE: Lia Lemon is a 37 y.o. female  comes in for chronic muscle aches diffusely  and weight reduction. She  is RN MANAGEER for a living.  She  has the following hobbies: hanging out with her boys and her .     WEIGHT LOSS INTAKE    Reason for wanting to lose weight: \"to be healthier and more confident\"  Goal weight: 185  Last time at that weight:  Age 32  Overweight whole life:  Yes    Family history:    Heart disease/attack: father, grandfather   High blood pressure: father   High cholesterol: Father   Diabetes: Father    History of illicit drug or alcohol abuse:  No  History of eating disorder:  No    Triggers for eating: hunger, boredom, stress, habit    Typical Daily Food:   Breakfast: cheese stick and coffee   Lunch: frozen entree, yogurt, fiber one and water   Dinner: spaghetti, burger, chicken strips, chicken, salad, beans, grilled cheese   Snacks: \"I'm a  of leftovers.\"    Contraception:  condoms    See weight loss program intake form for more information/details.       ROS  A comprehensive review of systems was " "negative.  Pertinent items are noted in HPI.    EXAM  Initial Weight: 253.5 lbs  Weight: 253 lb 8 oz (115 kg)  Weight loss from initial: 0  % Weight loss: 0 %  Body Fat %: 51.8  Waist Circumference: 53 inches  Neck Circumference: 15.5 inches     /84  Pulse 80  Resp 16  Ht 5' 5\" (1.651 m)  Wt (!) 253 lb 8 oz (115 kg)  LMP 03/30/2017 (Exact Date)  Breastfeeding? No  BMI 42.18 kg/m2   Skin: Skin Tags: absent, Acanthosis: absent and Striae: present  Thyroid: normal to inspection and palpation  Cardiac: Regular rate and rhythm and S1S2 present  Lungs: clear  Leg Edema: absent  Abdomen: abdomen is soft without significant tenderness, masses, organomegaly or guarding      "

## 2021-06-10 NOTE — PROGRESS NOTES
Assessment/Plan:   Exposure to Strep pharyngitis - all 5 sons tested positive for strep today.    Amoxicillin twice a day for 10 days  Change toothbrush in 2-3 days  Follow up as needed.     Subjective:      Lia Lemon is a 37 y.o. female who presents with her family for possible strep.  One of her kids was diagnosed with strep earlier today and she has brought the rest of the family in now to be checked.  Her 4 other kids are also positive for strep.  She has felt pretty well, just very tired and this is not typical for her but is the common presenting symptoms when she has gotten strep from the kids before.  No fever.  No URI or cough.  No rash.  No V/D or abdominal pain.      Allergies   Allergen Reactions     Morphine Hives and Itching     Current Outpatient Prescriptions on File Prior to Visit   Medication Sig Dispense Refill     cholecalciferol, vitamin D3, (VITAMIN D3) 2,000 unit Tab Take 2000 IU daily  0     chromium 200 mcg Tab Take 400mg once daily or 200 mg twice daily (at snack times) 30 each 0     multivitamin (ONE A DAY) per tablet Take 1 tablet by mouth daily. 120 tablet 2     OMEGA-3/DHA/EPA/FISH OIL (FISH OIL-OMEGA-3 FATTY ACIDS) 300-1,000 mg capsule Take 2 capsules (2 g total) by mouth daily.  0     valACYclovir (VALTREX) 1000 MG tablet Take 1 tablet (1,000 mg total) by mouth daily. 30 tablet 0     [DISCONTINUED] phentermine (ADIPEX-P) 37.5 mg tablet 1/2 tab po bid 30 tablet 0     No current facility-administered medications on file prior to visit.      Patient Active Problem List   Diagnosis     Palpitations     Abdominal Pain     Joint pain localized in the shoulder      Vaginal delivery     BMI 40.0-44.9, adult     Chronic pain     Vitamin D deficiency disease     Hyperlipidemia       Objective:     BP (!) 165/91  Pulse 77  Temp 98.3  F (36.8  C) (Oral)   Resp 16  Wt (!) 239 lb 12.8 oz (108.8 kg)  LMP 03/30/2017 (Exact Date)  SpO2 99%  Breastfeeding? No  BMI 39.9  kg/m2    Physical  General Appearance: Alert, cooperative, no distress  Head: Normocephalic, without obvious abnormality, atraumatic  Eyes: Conjunctivae are normal.   Ears: Normal TMs and external ear canals, both ears  Nose: No significant congestion.  Throat: Throat is red.  No exudate.  No significant lesions  Neck: No adenopathy  Lungs: Clear to auscultation bilaterally, respirations unlabored  Heart: Regular rate and rhythm  Skin:  no rashes or lesions  Psychiatric: Patient has a normal mood and affect.        Recent Results (from the past 24 hour(s))   Rapid Strep A Screen-Throat   Result Value Ref Range    Rapid Strep A Antigen No Group A Strep detected, presumptive negative No Group A Strep detected, presumptive negative

## 2021-06-10 NOTE — PROGRESS NOTES
ASSESSMENT AND PLAN:   I spent 20 minutes with the patient. 100 % of that time was spent face to face.    Dx: chronic diffuse muscle pain, hyperlipidemia and bmi 42  BECAUSE OF chronic muscle pain and elevated bmi PROBLEMS IT IS STRONGLY ADVISED THAT Lia LOSE WEIGHT.  BELOW IS MY PLAN FOR her     Since Lia has morbid obesity, if conservative management does not work, could consider surgical management in the form of bariatric surgery.       Alicia Kenney MD  0- pcp  Start weight 253 lbs, 3/30/2017 234 5/2/2017  Goal: 228- 240  Ideal body weight: 57 kg (125 lb 10.6 oz)    Prescribed meds: phentermine   Supplements: fish oil, vit D 2000, mvi,   Goals this month: try to give body a break by trying to keep healthy habits and maintain weight.   Follow up monthly.    Recommended macronutrients;   Calories: 1568 daily  Protein 120 grams per day  carbs 50-75 grams per day     Chief Complaint   Patient presents with     Weight Loss      SUBJECTIVE: Lia Lemon is a 37 y.o. female  comes in for chronic muscle aches diffusely  and weight reduction. She  is RN MANAGEER for a living.  She  has the following hobbies: hanging out with her boys and her .     She feels really good, she did not feel this month was hard and had not realized how much weight she'd lost.     Are you experiencing any side effects to the medications:  Some jitteryness. Tolerable.   Hunger control:  Good.   Exercise was discussed: yes. walking  Taking supplements:  Fish oil, mvi, vit D.  Discussed journaling food:  Intermittently.   Patient is pleased with the current results:  Yes.   The patient is following the nutrition plan:  Yes trying.   Barriers to losing weight:  None.      ROS  A comprehensive review of systems was negative.  Pertinent items are noted in HPI.    EXAM  Initial Weight: 253.5 lbs  Weight: 234 lb (106.1 kg)  Weight loss from initial: 19.5  % Weight loss: 7.69 %     /80  Pulse 88  Resp 16  Wt (!) 234 lb  (106.1 kg)  BMI 38.94 kg/m2   Physical Exam   Constitutional: She is oriented to person, place, and time. She appears well-developed and well-nourished.   HENT:   Head: Normocephalic and atraumatic.   Eyes: Conjunctivae are normal.   Cardiovascular: Normal rate and regular rhythm.    Pulmonary/Chest: Effort normal.   Neurological: She is alert and oriented to person, place, and time.   Skin: Skin is warm and dry.   Psychiatric: She has a normal mood and affect.

## 2021-06-11 NOTE — PROGRESS NOTES
SUBJECTIVE:   Pt presents with sore throat that started yesterday. Fever absent. Two children had strep last week.    OBJECTIVE:   Appears well.  Ears: normal  Eyes: no redness or discharge  Nose: no discharge  Oropharynx: normal  Neck: no adenopathy  Lungs: unlabored breathing  Skin: no strep-like sandpaper rash.      Rapid Strep test: negative    ASSESSMENT:   Pharyngitis with a negative RST. No evidence for peritonsillar abscess    PLAN:   Step RNA test is pending and if positive, we will call in a Rx for an antibiotic  Use acetaminophen or other OTC analgesic.   F/U prn.

## 2021-06-11 NOTE — PROGRESS NOTES
ASSESSMENT AND PLAN:   I spent 20 minutes with the patient. 100 % of that time was spent face to face.    Dx: chronic diffuse muscle pain, hyperlipidemia and bmi 42  BECAUSE OF chronic muscle pain and elevated bmi PROBLEMS IT IS STRONGLY ADVISED THAT Lia LOSE WEIGHT.  BELOW IS MY PLAN FOR her     Since Lia has morbid obesity, if conservative management does not work, could consider surgical management in the form of bariatric surgery.       Alicia Kenney MD  - pcp  Start weight 253 lbs, 3/30/2017 234 5/31/2017  Goal: 228- 240 (she has already exceeded this)  Ideal body weight: 57 kg (125 lb 10.6 oz)    Prescribed meds: phentermine   Supplements: fish oil, vit D 2000, mvi,   Goals this month: increase water from 50 oz to 70 oz or so. PLAN FOR INBODY AT FOLLOW UP.  Follow up monthly.    Recommended macronutrients;   Calories: 1568 daily  Protein 120 grams per day (She says she gets 80 - 100 most days)  carbs 50-75 grams per day     Chief Complaint   Patient presents with     Weight Loss      SUBJECTIVE: Lia Lemon is a 37 y.o. female  comes in for chronic muscle aches diffusely  and weight reduction. She  is RN MANAGEER for a living.  She  has the following hobbies: hanging out with her boys and her .     She feels really good, she did not feel this month was hard and had not realized how much weight she'd lost.     Are you experiencing any side effects to the medications:  Some jitteryness. Tolerable.   Hunger control:  Good.   Exercise was discussed: yes. walking  Taking supplements:  Fish oil, mvi, vit D.  Discussed journaling food:  Intermittently.   Patient is pleased with the current results:  Yes.   The patient is following the nutrition plan:  Yes trying.   Barriers to losing weight:  None.      ROS  A comprehensive review of systems was negative.  Pertinent items are noted in HPI.    EXAM  Initial Weight: 253.5 lbs  Weight: 224 lb (101.6 kg)  Weight loss from initial: 29.5  % Weight  "loss: 11.64 %     /68 (Patient Site: Left Arm, Patient Position: Sitting, Cuff Size: Adult Large)  Pulse 70  Ht 5' 5\" (1.651 m)  Wt (!) 224 lb (101.6 kg)  Breastfeeding? No  BMI 37.28 kg/m2   Physical Exam   Constitutional: She is oriented to person, place, and time. She appears well-developed and well-nourished.   HENT:   Head: Normocephalic and atraumatic.   Eyes: Conjunctivae are normal.   Cardiovascular: Normal rate and regular rhythm.    Pulmonary/Chest: Effort normal.   Neurological: She is alert and oriented to person, place, and time.   Skin: Skin is warm and dry.   Psychiatric: She has a normal mood and affect.      "

## 2021-06-12 NOTE — TELEPHONE ENCOUNTER
Refill Approved    Rx renewed per Medication Renewal Policy. Medication was last renewed on 10/14/20.    Rosa Ortiz, [facility] Triage/Med Refill 11/9/2020     Requested Prescriptions   Pending Prescriptions Disp Refills     lisinopriL (PRINIVIL,ZESTRIL) 10 MG tablet [Pharmacy Med Name: LISINOPRIL 10 MG TABLET] 30 tablet 0     Sig: TAKE 1 TABLET BY MOUTH EVERY DAY       [facility] Inhibitors Refill Protocol Passed - 11/6/2020 12:55 AM        Passed - PCP or prescribing provider visit in past 12 months       Last office visit with prescriber/PCP: 7/18/2018 Alicia Kenney MD OR same dept: Visit date not found OR same specialty: 8/9/2019 Faustina Cline MD  Last physical: 10/14/2020 Last MTM visit: Visit date not found   Next visit within 3 mo: Visit date not found  Next physical within 3 mo: Visit date not found  Prescriber OR PCP: Alicia Kenney MD  Last diagnosis associated with med order: 1. Essential hypertension  - lisinopriL (PRINIVIL,ZESTRIL) 10 MG tablet [Pharmacy Med Name: LISINOPRIL 10 MG TABLET]; TAKE 1 TABLET BY MOUTH EVERY DAY  Dispense: 30 tablet; Refill: 0    If protocol passes may refill for 12 months if within 3 months of last provider visit (or a total of 15 months).             Passed - Serum Potassium in past 12 months     Lab Results   Component Value Date    Potassium 4.3 10/14/2020             Passed - Blood pressure filed in past 12 months     BP Readings from Last 1 Encounters:   10/14/20 132/82             Passed - Serum Creatinine in past 12 months     Creatinine   Date Value Ref Range Status   10/14/2020 0.72 0.60 - 1.10 mg/dL Final

## 2021-06-12 NOTE — PROGRESS NOTES
Assessment:      Healthy female exam.      Plan:       All questions answered.      ASSESSMENT: 40 y.o. female physical exam.     PLAN:     Routine general medical examination at a health care facility [Z00.00]    1. Routine general medical examination at a health care facility  - Hemoglobin  - Urinalysis Macroscopic  - Lipid Cascade FASTING    2. Essential hypertension  - Lipid Louisa FASTING  - Comprehensive Metabolic Panel  - lisinopriL (PRINIVIL,ZESTRIL) 10 MG tablet; Take 1 tablet (10 mg total) by mouth daily.  Dispense: 30 tablet; Refill: 0    3. Mixed hyperlipidemia  - Lipid Cascade FASTING  - Comprehensive Metabolic Panel    4. Vitamin D deficiency disease  - Vitamin D, Total (25-Hydroxy)      Patient is a 40 y.o. female who is overall doing well.  She has noticed that her blood pressure is a bit high when she had an injection a few weeks ago at [facility] and also at home she is noticing that they are in the 130s over 90s.  We will start a little bit of lisinopril and see how she does.  Prescription for lisinopril 10 mg was sent to the pharmacy.  She should call me or my chart me in about 2 or 3 weeks with blood pressure readings.  She does have access to having her blood pressure checked both at work as well as at home.  She has had a history of hypertension in the past and so hopefully the lisinopril will help her.  She also has a history of high cholesterol so we will go ahead and check metabolic panel as well as lipid panel for both the hypertension and the hyperlipidemia.  We will contact her with results of those when they return.  She does have history of vitamin D deficiency in the past and so we will go ahead and check a vitamin D level today as well.  I did remind her that she is due for mammogram at the age of 41.  She declined HIV testing today.  She has ready had a flu vaccination this season.  She feels like emotionally she is doing okay although she is a bit stressed because of try  to do distance learning with all 5 of her kids as well as stress at work given the fact that she works at [employer] and is not sure what is going to happen with her job.  All of her questions and concerns were addressed today.  If she has additional problems or concerns should let me know.    Will contact her with the results of the labs when available.  Alicia Kenney M.D.       Subjective:      Lia Lemon is a 40 y.o. female who presents for an annual exam. The patient is sexually active. The patient participates in regular exercise: no. The patient reports that there is not domestic violence in her life.  Patient overall feels like things are doing okay.  Emotionally she seems to be doing fine.  She does not feel like she has any assistance needed at this point.  She is trying to juggle enough a lot of things with the fact that she works at [employer] and so she is not sure exactly what her job is going to entail or how her job is going to change with the closing of [employer].  She also is trying to do distance learning with all 5 of her kids and certainly that stressful.  She feels like she is dealing with it okay and does not feel like she needs any additional assistance currently.  If that changes in the future she certainly will let me know.  She has had a history of blood pressure elevation in the past and she is noticed at home that her blood pressure is in the 130s over 90s but when she went to have an injection a couple of weeks ago her blood pressure was in the 150s over 110s.  She notes that previously the weight loss clinic is put her on some hydrochlorothiazide and it did not make much of a difference for her but she is wondering if she should be on something to help with her blood pressure.  She is feeling well.  She denies that she is having any chest pain or shortness of breath or swelling of her extremities.  She does note that she has a C6-C7 herniated disc that she has been  following with [facility] for.  That is where she had the injection done a couple of weeks ago.  She does not feel like the injection did a whole lot but she is getting continue to work with [facility] to see if they can figure out what else is going on.  She also notes that she has some chronic pain in her upper chest and shoulder region which they think is probably due to this herniated disc.  She has had a history of vitamin D deficiency in the past we will go ahead and check a vitamin D level today.  She has already had her flu vaccination this year.  I did discuss HIV and she declines testing today.  She has had it previously when she was pregnant.  Patient does get her Pap and pelvic exam through her OB/GYN.  We will complete an LINDA so that we can get a copy of the Pap smear but she declines any kind a Pap smear pelvic exam today.    Healthy Habits:   Regular Exercise: No  Sunscreen Use: Yes  Healthy Diet: No  Dental Visits Regularly: Yes  Seat Belt: Yes  Sexually active: Yes  Self Breast Exam Monthly:No  Hemoccults: N/A  Flex Sig: N/A  Colonoscopy: N/A  Lipid Profile: N/A  Glucose Screen: N/A  Prevention of Osteoporosis: N/A  Last Dexa: N/A   Guns at Home:  No      Immunization History   Administered Date(s) Administered     DT (pediatric) 12/02/1997     DTP 1981, 1981, 1981, 09/07/1982, 09/03/1985     DTaP, historic 12/28/2015     Hep B, Peds or Adolescent 09/14/1993, 11/02/1993, 04/26/1994     Influenza [id number], 11/19/2009     Influenza, Seasonal, Inj PF IIV3 12/28/2011, 09/26/2012, 10/10/2013, 10/04/2016     Influenza, inj, historic,unspecified 10/27/2014, 10/15/2015, 09/27/2020     Influenza,seasonal, Inj IIV3 10/27/2014, 10/08/2015     MMR 09/07/1982, 08/23/1993     OPV,Trivalent,Historic([id number] only) 1981, 1981, 09/07/1982, 09/03/1985     PPD Test 07/24/2006     Rho (D) Immune Globulin 08/30/2011     Td, Adult, Absorbed 09/07/1982, 09/03/1985, 12/02/1997     Td, adult adsorbed,  PF 2006     Tdap 2011, 2015     Immunization status: up to date and documented, patient has ready had flu vaccination this year.  Other vaccinations are up-to-date.    No exam data present    Gynecologic History  Patient's last menstrual period was 10/03/2020 (approximate).  Contraception: vasectomy  Last Pap: 1200-0595. Results were: normal  Last mammogram: NA. Results were: NA      OB History    Para Term  AB Living   7 5 5 0 2 5   SAB TAB Ectopic Multiple Live Births   2 0 0 0 5      # Outcome Date GA Lbr Nick/2nd Weight Sex Delivery Anes PTL Lv   7 Term 16 39w0d / 00:07 8 lb 3 oz (3.714 kg) M  EPI N VIOLETTE      Birth Comments: Pregnancy not planned, no difficulty getting pregnant, no illness during pregnancy, baby arrived on time, baby had 1 night in NICU for apneic episode, no concerns during labor or delivery; no troubles starting to breathe, no hormones taken during pregnancy, no smoking during pregnancy; vaginal delivery   6 Term 11 38w0d  8 lb 13 oz (3.997 kg) M  EPI  VIOLETTE   5 Term 09 39w0d  8 lb 14 oz (4.026 kg) M  None  VIOLETTE   4 Term 08 38w0d  8 lb 3 oz (3.714 kg) M  EPI  VIOLETTE   3 Term 07 39w0d  9 lb 2 oz (4.139 kg) M CS-LTranv EPI N VIOLETTE   2 SAB 06              Birth Comments: D&C   1 SAB 03 8w0d              Current Outpatient Medications   Medication Sig Dispense Refill     cholecalciferol, vitamin D3, (VITAMIN D3) 2,000 unit Tab Take 2000 IU daily  0     cyclobenzaprine (FLEXERIL) 10 MG tablet Take 1 tablet (10 mg total) by mouth 2 (two) times a day as needed for muscle spasms. 20 tablet 0     hydroCHLOROthiazide (HYDRODIURIL) 25 MG tablet TAKE 1 TABLET BY MOUTH EVERY DAY 90 tablet 0     lisinopriL (PRINIVIL,ZESTRIL) 10 MG tablet Take 1 tablet (10 mg total) by mouth daily. 30 tablet 0     multivit-min/ferrous fumarate (MULTI VITAMIN ORAL) Take by mouth.       naltrexone (DEPADE) 50 mg tablet Take 0.5 tablets (25 mg  total) by mouth daily. 15 tablet 0     OMEGA-3/DHA/EPA/FISH OIL (FISH OIL-OMEGA-3 FATTY ACIDS) 300-1,000 mg capsule Take 2 capsules (2 g total) by mouth daily.  0     valACYclovir (VALTREX) 1000 MG tablet        No current facility-administered medications for this visit.      Past Medical History:   Diagnosis Date     Chronic pain     upper back  pain     CTS (carpal tunnel syndrome)     bilateral     Hyperlipidemia      Miscarriage     x2, one D&C, one medication, no complications     Neck pain 10/2020    herniated disc C6-C7, treating at [place], had injection, not real helpful     Normal delivery     x4     Palpitations      Shoulder joint pain     right- past hx.     Vitamin D deficiency      Past Surgical History:   Procedure Laterality Date     ADRENALECTOMY Right     CORTISOL-SECRETING ADENOMA, S/P HEMORRHAGE.      SECTION      first child     DILATION AND CURETTAGE OF UTERUS       DE REVISE MEDIAN N/CARPAL TUNNEL SURG Right 2018    Procedure: RIGHT CARPAL TUNNEL;  Surgeon: Do Hennessy MD;  Location: [facility];  Service: Neurosurgery     Morphine  Family History   Problem Relation Age of Onset     Hypertension Father      Heart disease Father         MI, a fib, cardiac ablation, ICD     Recurrent abdominal pain Mother         removed a bunch of intestine      Social History     Socioeconomic History     Marital status:      Spouse name: Macario     Number of children: 5     Years of education: Not on file     Highest education level: Not on file   Occupational History     Occupation: RN Manager     Comment: [employer]   Social Needs     Financial resource strain: Not on file     Food insecurity     Worry: Not on file     Inability: Not on file     Transportation needs     Medical: Not on file     Non-medical: Not on file   Tobacco Use     Smoking status: Never Smoker     Smokeless tobacco: Never Used   Substance and Sexual Activity     Alcohol use: Yes      "Alcohol/week: 5.0 standard drinks     Types: 5 Standard drinks or equivalent per week     Frequency: 2-4 times a month     Drinks per session: 3 or 4     Binge frequency: Less than monthly     Comment: occas.     Drug use: No     Sexual activity: Yes     Partners: Male     Birth control/protection: Surgical     Comment: vasectomy   Lifestyle     Physical activity     Days per week: Not on file     Minutes per session: Not on file     Stress: Not on file   Relationships     Social connections     Talks on phone: Not on file     Gets together: Not on file     Attends Islam service: Not on file     Active member of club or organization: Not on file     Attends meetings of clubs or organizations: Not on file     Relationship status: Not on file     Intimate partner violence     Fear of current or ex partner: Not on file     Emotionally abused: Not on file     Physically abused: Not on file     Forced sexual activity: Not on file   Other Topics Concern     Not on file   Social History Narrative     Not on file               Objective:         Vitals:    10/14/20 0826   BP: 132/82   Pulse: 90   SpO2: 99%   Weight: (!) 237 lb (107.5 kg)   Height: 5' 4.5\" (1.638 m)     Body mass index is 40.05 kg/m .       REVIEW OF SYSTEMS:   Denies fever, chills, visual changes, fatigue, myalgias, nasal congestion, rhinorrhea, ear pain or discharge, sore throat, swollen glands, breast mass, nipple discharge, breast changes, abdominal pain, nausea, vomiting, diarrhea, constipation, cough, shortness of breath, chest pain, weight change, change in bowel habits, melena, rectal bleeding, dysuria, frequency, urgency, hematuria, polyuria, polydipsia, polyphagia, joint pain or swelling or erythema, edema, rash, weakness, paresthesias, vaginal discharge or bleeding or mood changes.  Remainder of review of systems was negative.      PHYSICAL EXAM:  On exam, patient is a WD, WN 40 y.o. female in NAD.  /82   Pulse 90   Ht 5' 4.5\" (1.638 " m)   Wt (!) 237 lb (107.5 kg)   LMP 10/03/2020 (Approximate)   SpO2 99%   Breastfeeding No   BMI 40.05 kg/m    Head normocephalic, atraumatic.  Eyes PERRL, ears TM s clear bilaterally.  Throat without significant erythemia or exudate.  Neck was supple, full range of motion. No significant lymphadenopathy or thyromegaly was appreciated.  Lungs clear to auscultation  Heart regular rate and rhythm.  Breast exam was done. No masses were appreciated. Axilla were clear bilaterally. No nipple discharge was appreciated. Self breast exam was reviewed and taught today.  Abdomen was soft, nontender, nondistended. No masses or organomegaly were palpated. Positive bowel sounds were appreciated.  Extremities with full range of motion of all 4 extremities were noted.  Deep tendon reflexes were equal and symmetrical. Motor and sensation were intact to both the upper and lower extremities.  Cranial nerves 2 through 12 were grossly intact.  EOM were intact.  Patient declined Pap smear or pelvic exam today.  She does get them through her OB/GYN office.    LABS:    Recent Results (from the past 240 hour(s))   Hemoglobin   Result Value Ref Range    Hemoglobin 13.1 12.0 - 16.0 g/dL   Lipid Cascade FASTING   Result Value Ref Range    Cholesterol 229 (H) <=199 mg/dL    Triglycerides 59 <=149 mg/dL    HDL Cholesterol 88 >=50 mg/dL    LDL Calculated 129 <=129 mg/dL    Patient Fasting > 8hrs? Yes    Comprehensive Metabolic Panel   Result Value Ref Range    Sodium 139 136 - 145 mmol/L    Potassium 4.3 3.5 - 5.0 mmol/L    Chloride 105 98 - 107 mmol/L    CO2 25 22 - 31 mmol/L    Anion Gap, Calculation 9 5 - 18 mmol/L    Glucose 105 70 - 125 mg/dL    BUN 8 8 - 22 mg/dL    Creatinine 0.72 0.60 - 1.10 mg/dL    GFR MDRD Af Amer >60 >60 mL/min/1.73m2    GFR MDRD Non Af Amer >60 >60 mL/min/1.73m2    Bilirubin, Total 0.5 0.0 - 1.0 mg/dL    Calcium 9.1 8.5 - 10.5 mg/dL    Protein, Total 7.0 6.0 - 8.0 g/dL    Albumin 3.8 3.5 - 5.0 g/dL    Alkaline  Phosphatase 110 45 - 120 U/L    AST 31 0 - 40 U/L    ALT 14 0 - 45 U/L   Vitamin D, Total (25-Hydroxy)   Result Value Ref Range    Vitamin D, Total (25-Hydroxy) 23.9 (L) 30.0 - 80.0 ng/mL   Urinalysis Macroscopic   Result Value Ref Range    Color, UA Yellow Colorless, Yellow, Straw, Light Yellow    Clarity, UA Clear Clear    Glucose, UA Negative Negative    Bilirubin, UA Negative Negative    Ketones, UA Negative Negative    Specific Gravity, UA 1.025 1.005 - 1.030    Blood, UA Negative Negative    pH, UA 7.0 5.0 - 8.0    Protein, UA Negative Negative mg/dL    Urobilinogen, UA 0.2 E.U./dL 0.2 E.U./dL, 1.0 E.U./dL    Nitrite, UA Negative Negative    Leukocytes, UA Negative Negative

## 2021-06-12 NOTE — PROGRESS NOTES
ASSESSMENT AND PLAN:   I spent 20 minutes with the patient. 100 % of that time was spent face to face.    Dx: chronic diffuse muscle pain, hyperlipidemia and bmi 42  BECAUSE OF chronic muscle pain and elevated bmi PROBLEMS IT IS STRONGLY ADVISED THAT Lia LOSE WEIGHT.  BELOW IS MY PLAN FOR her     Since Lia has morbid obesity, if conservative management does not work, could consider surgical management in the form of bariatric surgery.       Alicia Kenney MD  - pcp  Start weight 253 lbs, 3/30/2017 202 8/22/2017    Ideal body weight: 57 kg (125 lb 10.6 oz)    Prescribed meds: phentermine   Supplements: fish oil, vit D 2000, mvi,   Goals this month: increase healthy fats at dinner time to help evening time satiety  Follow up monthly.    Recommended macronutrients;   Calories: 4429-6363 daily  Protein 105 grams per day (She says she gets 80 - 100 most days)  carbs 50-75 grams per day     In body-7/12/2017.  The inbody was done and was compared to her initial and body done 3/30/2017.  She has lost significant amount of muscle mass as well as fat mass.  Her percent body fat has changed very little from 51% to 50%.  We talked about how concerning this is in terms of long-term weight maintenance.  We talked about the importance of protein intake as well as physical activity to retain muscle mass.    Chief Complaint   Patient presents with     Weight Loss      SUBJECTIVE: Lia Lemon is a 37 y.o. female  comes in for chronic muscle aches diffusely  and weight reduction. She  is RN MANAGEER for a living.  She  has the following hobbies: hanging out with her boys and her .     She feels really good she is excited that she is losing so quickly.    Are you experiencing any side effects to the medications:  Some jitteryness. Tolerable.   Hunger control:  Good.   Exercise was discussed: yes. walking  Taking supplements:  Fish oil, mvi, vit D.  Discussed journaling food: not really anymore.  Patient is  pleased with the current results:  Yes.   The patient is following the nutrition plan:  Yes trying.   Barriers to losing weight:  None.      ROS  A comprehensive review of systems was negative.  Pertinent items are noted in HPI.    EXAM  Initial Weight: 253.5 lbs  Weight: 202 lb (91.6 kg)  Weight loss from initial: 51.5  % Weight loss: 20.32 %     /80  Pulse 80  Resp 16  Wt 202 lb (91.6 kg)  BMI 34.14 kg/m2   Physical Exam   Constitutional: She is oriented to person, place, and time. She appears well-developed and well-nourished.   HENT:   Head: Normocephalic and atraumatic.   Eyes: Conjunctivae are normal.   Cardiovascular: Normal rate and regular rhythm.    Pulmonary/Chest: Effort normal.   Neurological: She is alert and oriented to person, place, and time.   Skin: Skin is warm and dry.   Psychiatric: She has a normal mood and affect.

## 2021-06-13 NOTE — PROGRESS NOTES
ASSESSMENT AND PLAN:   I spent 20 minutes with the patient. 100 % of that time was spent face to face.    Dx: chronic diffuse muscle pain, hyperlipidemia and bmi 42  BECAUSE OF chronic muscle pain and elevated bmi PROBLEMS IT IS STRONGLY ADVISED THAT Lia LOSE WEIGHT.  BELOW IS MY PLAN FOR her     Since Lia has morbid obesity, if conservative management does not work, could consider surgical management in the form of bariatric surgery.       Alicia Kenney MD  - pcp  Start weight 253 lbs, 3/30/2017 189 11/7/2017    Ideal body weight: 57 kg (125 lb 10.6 oz)    Prescribed meds: phentermine. Started to introduce discussion of idea of weaning 11/7/2017 - I think it will be difficult to wean her with this dramatic weight loss as she maybe becoming reliant on the phentermine and may need to make further changes to her diet to maintain this loss off of the phentermine  Supplements: fish oil, vit D 2000, mvi,   Goals this month: she did start exercising and will continue to work on that this month.  Follow up monthly.    Recommended macronutrients;   Calories: 9543-1321 daily  Protein 105 grams per day (She says she gets 80 - 100 most days)  carbs 50-75 grams per day     In body-7/12/2017.  The inbody was done and was compared to her initial and body done 3/30/2017.  She has lost significant amount of muscle mass as well as fat mass.  Her percent body fat has changed very little from 51% to 50%.  We talked about how concerning this is in terms of long-term weight maintenance.  We talked about the importance of protein intake as well as physical activity to retain muscle mass.      Problem List Items Addressed This Visit     Hyperlipidemia (Chronic)     Lab Results   Component Value Date    CHOL 216 (H) 03/30/2017     Lab Results   Component Value Date    HDL 63 03/30/2017     Lab Results   Component Value Date    LDLCALC 137 (H) 03/30/2017     Lab Results   Component Value Date    TRIG 82 03/30/2017     Repeat  "lipids ordered today.         Vitamin D deficiency disease     Vitamin D, Total (25-Hydroxy)   Date Value Ref Range Status   03/30/2017 22.6 (L) 30.0 - 80.0 ng/mL Final      Will recheck today. Order placed.                 Chief Complaint   Patient presents with     Weight Loss      SUBJECTIVE: Lia Lemon is a 37 y.o. female  comes in for chronic muscle aches diffusely  and weight reduction. She  is RN MANAGEER for a living.  She  has the following hobbies: hanging out with her boys and her .     She feels really good she is excited that she is losing so quickly.    Are you experiencing any side effects to the medications:  Some jitteryness. Tolerable.   Hunger control:  Good.   Exercise was discussed: yes. walking  Taking supplements:  Fish oil, mvi, vit D.  Discussed journaling food: not really anymore.  Patient is pleased with the current results:  Yes.   The patient is following the nutrition plan:  Yes trying.   Barriers to losing weight:  None.      ROS  A comprehensive review of systems was negative.  Pertinent items are noted in HPI.    EXAM  Initial Weight: 253.5 lbs  Weight: 189 lb (85.7 kg)  Weight loss from initial: 64.5  % Weight loss: 25.44 %      /68 (Patient Site: Left Arm, Patient Position: Sitting, Cuff Size: Adult Regular)  Pulse 88  Resp 16  Ht 5' 4.5\" (1.638 m)  Wt 189 lb (85.7 kg)  LMP 10/30/2017  SpO2 99%  Breastfeeding? No  BMI 31.94 kg/m2   Physical Exam   Constitutional: She is oriented to person, place, and time. She appears well-developed and well-nourished.   HENT:   Head: Normocephalic and atraumatic.   Eyes: Conjunctivae are normal.   Cardiovascular: Normal rate and regular rhythm.    Pulmonary/Chest: Effort normal.   Neurological: She is alert and oriented to person, place, and time.   Skin: Skin is warm and dry.   Psychiatric: She has a normal mood and affect.      "

## 2021-06-14 NOTE — PROGRESS NOTES
ASSESSMENT AND PLAN:   I spent 20 minutes with the patient. 100 % of that time was spent face to face.    Dx: chronic diffuse muscle pain, hyperlipidemia and bmi 42  BECAUSE OF chronic muscle pain and elevated bmi PROBLEMS IT IS STRONGLY ADVISED THAT Lia LOSE WEIGHT.  BELOW IS MY PLAN FOR her     Since Lia has morbid obesity, if conservative management does not work, could consider surgical management in the form of bariatric surgery.       Alicia Kenney MD  - pcp  Start weight 253 lbs, 3/30/2017 185 12/12/2017    Ideal body weight: 57 kg (125 lb 10.6 oz)    Prescribed meds: phentermine. Started to introduce discussion of idea of weaning 11/7/2017 and we continued to talk about it on 12/12/17- I think it might be difficult to wean her with this dramatic weight loss and we specifically discussed this but she is optimistic and would like to try.  Supplements: fish oil, vit D 2000, mvi,   Goals this month: she did start exercising and will continue to work on that this month.  Follow up monthly.    Recommended macronutrients;   Calories: 7515-1268 daily  Protein 105 grams per day (She says she gets 80 - 100 most days)  carbs 50-75 grams per day     In body-7/12/2017.  The inbody was done and was compared to her initial and body done 3/30/2017.  She has lost significant amount of muscle mass as well as fat mass.  Her percent body fat has changed very little from 51% to 50%.  We talked about how concerning this is in terms of long-term weight maintenance.  We talked about the importance of protein intake as well as physical activity to retain muscle mass.      Problem List Items Addressed This Visit     Hyperlipidemia (Chronic)     Lab Results   Component Value Date    CHOL 224 (H) 11/07/2017    CHOL 216 (H) 03/30/2017     Lab Results   Component Value Date    HDL 85 11/07/2017    HDL 63 03/30/2017     Lab Results   Component Value Date    LDLCALC 129 11/07/2017    LDLCALC 137 (H) 03/30/2017     Lab Results    Component Value Date    TRIG 50 11/07/2017    TRIG 82 03/30/2017     No components found for: CHOLHDL    Overall improved.  The total cholesterol has likely gone up due to the elevated HDL cholesterol which is a positive change.  Encouraged continued weight loss efforts.  Continue phentermine.    Initial Weight: 253.5 lbs  Weight: 185 lb 8 oz (84.1 kg)  Weight loss from initial: 68  % Weight loss: 26.82 %            BMI 40.0-44.9, adult     She continues to lose weight.  We will continue the phentermine for now.    Initial Weight: 253.5 lbs  Weight: 185 lb 8 oz (84.1 kg)  Weight loss from initial: 68  % Weight loss: 26.82 %          Vitamin D deficiency disease     We discussed that her vitamin D level has improved but that she could probably take up in a little more vitamin D daily and that would be beneficial.                Chief Complaint   Patient presents with     Weight Loss      SUBJECTIVE: Lia Lemon is a 37 y.o. female  comes in for chronic muscle aches diffusely  and weight reduction. She  is RN MANAGEER for a living.  She  has the following hobbies: hanging out with her boys and her .     She feels like things are going well and is still quite manageable.  She wants to continue losing weight because she is not having a hard time at this point.  She is enjoying eating more healthy whole foods and less processed foods.    Are you experiencing any side effects to the medications:  Some jitteryness. Tolerable.   Hunger control:  Good.   Exercise was discussed: yes. walking  Taking supplements:  Fish oil, mvi, vit D.  Discussed journaling food: not really anymore.  Patient is pleased with the current results:  Yes.   The patient is following the nutrition plan:  Yes trying.   Barriers to losing weight:  None.      ROS  A comprehensive review of systems was negative.  Pertinent items are noted in HPI.    EXAM  Initial Weight: 253.5 lbs  Weight: 185 lb 8 oz (84.1 kg)  Weight loss from initial:  68  % Weight loss: 26.82 %      /68 (Patient Site: Left Arm, Patient Position: Sitting, Cuff Size: Adult Large)  Pulse 72  Wt 185 lb 8 oz (84.1 kg)  Breastfeeding? No  BMI 31.35 kg/m2   Physical Exam   Constitutional: She is oriented to person, place, and time. She appears well-developed and well-nourished.   HENT:   Head: Normocephalic and atraumatic.   Eyes: Conjunctivae are normal.   Cardiovascular: Normal rate and regular rhythm.    Pulmonary/Chest: Effort normal.   Neurological: She is alert and oriented to person, place, and time.   Skin: Skin is warm and dry.   Psychiatric: She has a normal mood and affect.

## 2021-06-15 PROBLEM — E66.813 CLASS 3 SEVERE OBESITY DUE TO EXCESS CALORIES WITH SERIOUS COMORBIDITY AND BODY MASS INDEX (BMI) OF 40.0 TO 44.9 IN ADULT (H): Status: ACTIVE | Noted: 2017-03-30

## 2021-06-15 PROBLEM — E66.01 CLASS 3 SEVERE OBESITY DUE TO EXCESS CALORIES WITH SERIOUS COMORBIDITY AND BODY MASS INDEX (BMI) OF 40.0 TO 44.9 IN ADULT (H): Status: ACTIVE | Noted: 2017-03-30

## 2021-06-15 PROBLEM — E78.5 HYPERLIPIDEMIA: Chronic | Status: ACTIVE | Noted: 2017-04-03

## 2021-06-15 PROBLEM — G89.29 CHRONIC PAIN: Chronic | Status: ACTIVE | Noted: 2017-04-03

## 2021-06-15 PROBLEM — E55.9 VITAMIN D DEFICIENCY DISEASE: Status: ACTIVE | Noted: 2017-04-03

## 2021-06-15 NOTE — PROGRESS NOTES
ASSESSMENT AND PLAN:   I spent 25 minutes with the patient. 100 % of that time was spent face to face -we were specifically discussing how to keep the weight off now that she is lost it and how to successfully wean off phentermine..    Dx: chronic diffuse muscle pain, hyperlipidemia and bmi 42  BECAUSE OF chronic muscle pain and elevated bmi PROBLEMS IT IS STRONGLY ADVISED THAT Lia LOSE WEIGHT.  BELOW IS MY PLAN FOR her     Problem List Items Addressed This Visit     Chronic pain (Chronic)     She tells me that she continues to have chronic pain and this has not really improved despite her dramatic weight loss.    Initial Weight: 253.5 lbs  Weight: 185 lb (83.9 kg)  Weight loss from initial: 68.5  % Weight loss: 27.02 %  Body Fat %: 40.7  Waist Circumference: 42 inches  Neck Circumference: 14.5 inches          BMI 31.0-31.9,adult     She thinks she is not getting enough sleep so she will try to improve this. Could talk more about this at follow up as we spent majority of the time today discussing how to wean phentermine.     Started weaning Phentermine 1/24/2018.  Taking 5/7d per week.     Her heaviest weight would have qualified her for weight loss surgery so I will keep that in mind if she is having difficulty with her weight at this point.    Plan follow up in 2 months.      We went over her in body today and compared it to July and March when she had previous and bodies done.  She was actually losing muscle mass as well as fat mass back in July but then started to implement exercise and since July she is actually gained 4 pounds of muscle.  She is congratulated on this achievement and encouraged to continue her current habits and exercise.  We did discuss that her waist circumference did not change a lot since July and that this could be due to persistent elevation of insulin levels.  We zoned and again on her sleep and that getting more sleep may be helpful in reducing cortisol and thus insulin  "levels.    C  A  P  Plan    C stands for control weight equal to or greater than 188 pounds.  If your daily weights are less than this your disease is in control.  Continue your present dosages of medicine and your current efforts at weight maintenance.    A stands for action -if weight is between 189 pounds and 203 pounds. This is a weight guideline that lets you know your disease is starting to slip out of control.  We encourage actions such as journaling food intake, daily weighing and other to get back into the control weight range.  At an action weight you do not increase your weight loss medicines.  You continue at your present dosages.  If you are not on medications you do not resume them unless you are unsuccessful and reach your panic weight.    P stands for PANIC if weight reaches 204 or above.  Your disease is out of control despite your best efforts.  In this situation resume daily medicines and call the office for an appointment.                    Chief Complaint   Patient presents with     Weight Loss      SUBJECTIVE: Lia Lemon is a 37 y.o. female  comes in for chronic muscle aches diffusely  and weight reduction. She  is RN MANAGER for a living.  She  has the following hobbies: hanging out with her boys and her .     She feels like she is doing really well with her weight loss.  She feels that she is done it in a healthy way and she feels ready to start weaning the phentermine.    When discussing what could still be improved she says she is probably still not getting enough sleep.      ROS  A comprehensive review of systems was negative.  Pertinent items are noted in HPI.    EXAM  Initial Weight: 253.5 lbs  Weight: 185 lb (83.9 kg)  Weight loss from initial: 68.5  % Weight loss: 27.02 %  Body Fat %: 40.7  Waist Circumference: 42 inches  Neck Circumference: 14.5 inches      /78  Pulse 88  Resp 14  Ht 5' 4.5\" (1.638 m)  Wt 185 lb (83.9 kg)  BMI 31.26 kg/m2   Physical Exam "   Constitutional: She is oriented to person, place, and time. She appears well-developed and well-nourished.   HENT:   Head: Normocephalic and atraumatic.   Eyes: Conjunctivae are normal.   Cardiovascular: Normal rate and regular rhythm.    Pulmonary/Chest: Effort normal.   Neurological: She is alert and oriented to person, place, and time.   Skin: Skin is warm and dry.   Psychiatric: She has a normal mood and affect.

## 2021-06-15 NOTE — PROGRESS NOTES
[facility] Daily Progress     Patient Name: Lia Lemon  Date: 1/24/2018  Visit #: 3  Referral Diagnosis: neck and LBP  Referring provider: Alicia Kenney MD  Visit Diagnosis:     ICD-10-CM    1. Decreased range of motion of wrist M25.639    2. 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 Median nerve dysfunction, right G56.11    4. Poor posture R29.3    5. Cervicalgia M54.2          Assessment:   Patient noting relief following the manual therapy. She demonstrates compliance with the home exercises. PT to continue with services until the patient can be seen by Dr. Hennessy.    HEP/POC compliance is  good .  Patient is appropriate to continue with skilled physical therapy intervention, as indicated by initial plan of care.    Goal Status:  Pt. will be independent with home exercise program in : 4 weeks  Pt will: decrease NDI score by 30% in 8 weeks  Pt will: reduce waking episodes at night by 30% in 8weeks  Pt will: decrease post work pain < 4/10 pain in 8 weeks    Plan / Patient Education:     Continue with initial plan of care.  Progress with home program as tolerated.    Subjective:     Pain Rating: 3  Patient is noticing worsening of symptoms today. She has scheduled her follow-up with Dr. Hennessy and proceeding with a head and neck MRI for evaluation of her chiari malformation.      Objective:    PT focused on the manual therapy this visit due to late arrival.    Exercises:  Exercise #1: Unilateral pectoral stretch x 30 seconds/arm  Comment #1: wrist flexion and extension stretch x 20 seconds  Exercise #2: median nerve glide x10 reps  Comment #2: scapular retraction x 10 hold 5 seconds  Exercise #3: seated cervical sidebending stretch x 20 seconds  Comment #3: fish median nerve glide x 5      Treatment Today     TREATMENT MINUTES COMMENTS   Evaluation     Self-care/ Home management     Manual therapy 20 Patient positioned in prone- PA mobilization to thoracic spine, TPR to R UT> L. MFR to C-T  junction. Scapular mobilization medial glide B  Patient positioned in supine- Manual median nerve mobilization B, TPR to R UT, B UE release.    Noted increased tone and elevation of B scapulas.   Neuromuscular Re-education     Therapeutic Activity     Therapeutic Exercises  See flow sheet   Gait training     Modality__________________                Total 20 Patient 7 minutes late for treatment   Blank areas are intentional and mean the treatment did not include these items.       Debby Zhou  1/25/2018

## 2021-06-15 NOTE — PROGRESS NOTES
ASSESSMENT: Lia Lemon is a 37 y.o. female  with a BMI of Body mass index is 31.55 kg/(m^2). who is previously healthy presents today for new patient evaluation of bilateral hand numbness and tingling that is bilateral carpal tunnel syndrome (diagnosed by EMG).  She is also complaining of neck pain, which has a large myofascial pain component.  The patient is also complaining of thoracic spine pain and low back pain without sciatica.  The low back pain is likely lumbar facet syndrome.  Patient is also complaining of left elbow pain which is likely lateral epicondylitis.  The patient is neurologically intact without any red flag/myelopathic symptoms.    NDI: 24%  WHO-5: 15 (the patient is not interested in behavioral health services)    PLAN:  A shared decision making model was used.  The patient's values and choices were respected.  The following represents what was discussed and decided upon by the physician and the patient.      1.  DIAGNOSTIC TESTS/REFERRALS: The patient's imaging was performed in 2015 through [facility].  Her images will be sent to be uploaded into the [facility] system.  - The patient's MRI of the cervical spine was personally reviewed today.  The patient does have straightening of the cervical lordosis.  There is disc degeneration seen at the C5-6 and C6-7 levels.  There is no central canal or foraminal stenosis seen.  -The MRI report of the thoracic spine and the lumbar spine as well as the right shoulder was reviewed today.  -EMG report of the upper extremities is reviewed today.  -No further diagnostic testing necessary at this time.  Will consider updating imaging depending on how she does after her bilateral carpal tunnel release surgeries.  -The patient was requesting a referral to neurosurgery for consideration of the carpal tunnel release surgeries.  She would like to have it done through [facility] if possible.  She can also talk to them about her Chiari malformation.  2.   PHYSICAL THERAPY: Patient has never gone to physical therapy for any areas of her pain.  An order was provided today to work on the neck and upper back.  She may benefit from having physical therapy work on her shoulders and her left elbow as well but this was not ordered today (she does have home exercises given to her by the orthopedic surgeon for the lateral epicondylitis).  The therapists are asked to focus on the spine for now.  3.  MEDICATIONS: Discussed Gabapentin/Neurontin (nerve pain medication) mechanism of action as well as potential side effects (drowsiness/dizziness) with the patient.  The patient wanted to potentially try this medication so Gabapentin 800 mg was prescribed.  A chart was given with how to increase the dose to a maximum of 3 tablets three times a day.  The lowest therapeutic dose should be used.   The patient is asked to call the clinic if there are any side effects to the Gabapentin or if questions arise as to how to increase the dose.  If he does not tolerate 300 mg, then she could be switched to 100 mg capsules.  4.  INTERVENTIONS: No injections are indicated at this time.  Discussed that she needs to have the carpal tunnel syndrome taking care of first.  Once that variable has been removed and if she has residual pain, then this can be further assessed.  5.  PATIENT EDUCATION:   -Encouraged the patient to follow through with the carpal tunnel release surgeries for her hands.  Discussed that the numbness and tingling in hands likely due to the carpal tunnel and not likely due to the neck.   If she continues to have numbness/tingling in her upper extremities after the carpal tunnel releases, will look for other causes including cervical radiculitis.  -Discussed that physical therapy will help with the neck pain, upper back pain and low back pain.    -All of patient's questions were answered to her satisfaction today.  She was in agreement with the treatment plan.  -Patient requested a  letter for a sit to stand workstation.  This was provided today, she would significantly benefit from a sit to stand workstation at work given her chronic neck and upper back pain.  -The patient had a question about the Chiari malformation seen on her MRI.  Discussed that she can talk about this with neurosurgery but likely this will not require any intervention.  6.  FOLLOW-UP:   The patient will be contacted by nurse navigation in 2-3 weeks to check in.  There is navigation is asked to notify the physician if she knows when she is going to her carpal tunnel surgeries.  They can also check to see if she started taking the gabapentin.  Patient is asked to follow-up with Dr. Newton after she has had her carpal tunnel surgeries.  If she has any questions, concerns, or any significant worsening of her pain prior to that time she is encouraged to call the clinic.        SUBJECTIVE:  Lia Lemon  Is a 37 y.o. female who presents today for new patient evaluation of multiple areas of concern have been present for the last 3-4 years.  The patient states that she does have bilateral hand numbness and achiness.  He feels like she is losing strength in her hands.  The symptoms of the hand seem to be worse at night and wake her up from sleep.  She also notices that when she is driving occasionally.  She has been diagnosed with carpal tunnel syndrome by EMG at [facility].  The patient reports that initially she was told that the carpal tunnel syndrome was mild.  However when she returned for follow-up earlier this month, they told her that the carpal tunnel syndrome is severe in the right hand and mild in the left hand.  The surgeon has recommended that she undergo releases on both hands.  She is considering this, but is concerned that potentially the hand numbness and tingling could be coming from the neck.  She states that she had a neck MRI done 2 years ago that was essentially normal but is concerned that  "something could have changed in the interim.    Patient complains of neck and upper back discomfort have been present for 3-4 years.  These symptoms are starting to impact her daily functioning.  She does have burning down the center of her back, and she feels like it touches her esophagus.  This pain seems to be worse with stress and tension.  She has tried massaging the area and while this does hurt, it is the good kind of hurt.  She is also complaining of low back pain going across the low back.  She can get some what she describes as \"sensitivity\" in the buttocks, groin areas and thighs.  She describes this like a nerve type sensation.  They can be more pressure in her low back, particularly during her menstrual cycle.  She is otherwise unsure what else makes the pain worse.  She reports that nothing makes it better.  She has not taken any prescription medications for pain.  She has taken over-the-counter Tylenol and ibuprofen but very rarely.  This does not really help provide any pain relief.  The patient states that she was told that she has a slight tear in the right shoulder.  She is also been diagnosed with left lateral epicondylitis.  She is initially seen at [facility].  She states that she did not really follow up with them after  as she was told that things were mild.  She did return recently, as her symptoms worsened.  She is interested in keeping her care now through [facility].    Medications: Reviewed and the patient is taking a multivitamin, phentermine, fish oil, vitamin D.    Allergies: Reviewed and morphine causes itching/hives.    PMH: Reviewed and the patient denies any significant medical problems.    PSH: Reviewed and significant for right adrenal gland removal, .    Family History: Reviewed and significant for an unknown type of cancer, diabetes, coronary artery disease.    Social History: The patient is  and has 5 boys.  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  She drinks about a 12 pack of beer on the weekends.  She denies any tobacco or illicit drug use.    ROS: Positive for irregular heartbeat (palpitations), easy bruising, poor sleep quality (chronic and somewhat due to waking up due to her risk), back pain, joint pain, headaches (1-2 times per week in the occipital region, thinks it is related to tension), cold intolerance.  Specifically negative for dysphagia, imbalance, fine motor skill difficulties, bowel/bladder dysfunction, fevers,chills, appetite changes, unexplained weight loss.   Otherwise 13 systems reviewed are negative.  Please see the patient's intake questionnaire from today for details.      OBJECTIVE:  CONSTITUTIONAL:  Vital signs as above.  No acute distress.  The patient is well nourished and well groomed.  PSYCHIATRIC:  The patient is awake, alert, oriented to person, place, time and answering questions appropriately with clear speech.   HEENT:  Sclera are non-injected.  Extraocular muscles are intact. .  Moist oral mucosa.   SKIN:  Skin over the face, bilateral lower extremities, and posterior torso is clean, dry, intact without rashes.    GAIT:  Gait is non-antalgic.  The patient is able to heel and toe walk without significant difficulty.    MUSCLE STRENGTH:  The patient has 5/5 strength for the bilateral hip flexors, knee flexors/extensors, ankle dorsiflexors/plantar flexors, great toe extensors, ankle evertors/invertors.  5/5 strength for the bilateral shoulder abductors, elbow flexors/extensors, wrist extensors, finger flexors/abductors.  NEUROLOGICAL:  2/4 patellar, medial hamstring, and achilles reflexes bilaterally.  Downgoing Babinski's bilaterally.  No ankle clonus bilaterally. Sensation to light touch is intact in the bilateral L4, L5, and S1 dermatomes.  2/4 symmetric biceps, brachioradialis, triceps reflexes bilaterally.  Sensation to prick is intact in all of the digits of both upper extremities.  Negative  Le's bilaterally.    VASCULAR:  2/4 posterior tibial pulses bilaterally.  Bilateral lower extremities are warm.  There is no pitting edema of the bilateral lower extremities.  2/4 radial pulses bilaterally.  Warm upper limbs bilaterally.  Capillary refill in the upper extremities is less than 1 second.  ABDOMINAL:  Soft, non-distended, non-tender throughout all quadrants.  No pulsatile mass palpated in the left lower quadrant.  LYMPH NODES:  No palpable or tender inguinal lymph nodes.  No palpable or tender anterior/posterior cervical, submandibular, or supraclavicular lymph nodes.   MUSCULOSKELETAL: Patient has largely normal range of motion of the lumbar spine with flexion, extension, side bending, rotation.  Does have tenderness over the SI joints bilaterally.  She does report some pain with extension and with facet loading (simultaneous extension rotation bilaterally).  Negative straight leg raising test bilaterally.  The patient has mild restriction of the bilateral hips with internal rotation.  No significant restriction with external rotation bilaterally.  Fabere's test is negative bilaterally.  Range of motion of the cervical spine is largely normal with flexion, extension, side bending, rotation bilaterally.  Patient reports that she feels a pulling in her neck with testing all planes of motion.  She does have left greater than right hypertonicity of the cervical paraspinal muscles with tenderness to palpation.  She has hypertonicity and tenderness of the bilateral upper trapezius muscles and over the upper thoracic paraspinal muscles bilaterally.  He has normal active range of motion of the shoulders with flexion, extension, abduction, internal/external rotation.  Empty can sign is negative bilaterally.  Goodman test is negative laterally.  Carpal compression test is negative bilaterally.  Positive Tinel's at the wrist and at the elbows bilaterally.      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  No electrophysiologic evidence of right or left ulnar mononeuropathy.    MRI report of the cervical, thoracic, lumbar spine performed on February 3 of 2015: The cervical spine there is straightening cervical lordosis with a Chiari I malformation with the tonsils projecting 4-5 mm beyond the foramen magnum.  There is some minimal disc degeneration at the C5-6 and C6-7 levels.  There is no evidence for any central canal or foraminal stenosis at any level.  See the report for details.  MRI report of the thoracic spine shows that there is mild disc degeneration at T8-T9, T9-10, T10-11, T11-12.  Otherwise this is a normal MRI.  Please see the report for details.    MRI report of the lumbar spine shows a transitional L5 vertebral body with rudimentary disc.  There is intimal facet arthropathy at the L4-5 levels.  No central canal or foraminal stenosis seen at any level.  Please see the report for details.    MRI report of the right shoulder from February 2, 2015 report: Prominent tendinopathy of the supraspinatus tendon and moderate tendinopathy of the infraspinatus tendon.  Low-grade partial tearing of the insertion of the subscapularis with intrasubstance delamination.  Trace fluid in the subacromial/subdeltoid space.  Please see the report for details.

## 2021-06-15 NOTE — PROGRESS NOTES
NEUROSURGERY CONSULTATION NOTE:    Assessment:    Symptomatic Chiari malformation  Bilateral carpal tunnel, right worse than left    Plan: I have recommended a repeat EMG of both upper extremities to assess for her carpal tunnel syndrome.  Her old study is quite old.  I have also asked her to keep a diary regarding her headaches.  She will return after these things are completed and will have further discussion.    Lia ALICIA Xochilt   [id number]  [place] [address]  37 y.o. female is sent to me in consultation   by Alicia Kenney MD     CC:    Chief Complaint   Patient presents with     Consult     Chiari I malformation and Bilateral CTS       HPI:  Neurosurgery consultation was requested by: Dr. Newton for evaluation of Chiari I malformation and Bilateral CTR  Pain: presents in both hands and arms L>R, frequent posterior HA  Radicular Pain is absent  Lhermitte sign: denies  Motor complaints: decreased dexterity  Sensory complaints: numbness in the throat and esophagus  Gait and balance issues: absent  Bowel or bladder issues: denies  Duration of SX is: chronic  The symptoms are worse with: ROM  The symptoms are better with: rest  Injury: denies  Severity is: moderate  Patient has tried the following conservative measures: analgesics      PROBLEM LIST:  Chiari malformation  Bilateral carpal tunnel syndrome    REVIEW OF SYSTEMS:  A 12 point review of systems is positive for some ringing in the ears and difficulty hearing.  She also can experience a sore throat.  She does have occasional palpitations.  She does experience some heartburn.  The neurological symptoms are listed above to include headache.  She has some arthritic changes.  Brito, the review is negative.      Past medical history is positive for a hyper cortisol secreting tumor.  7 pregnancies with 2 miscarriages      Past Surgical History:   Procedure Laterality Date     ME  DELIVERY ONLY      Description:  Section;   Recorded: 07/12/2012;     [place] DILATION/CURETTAGE,DIAGNOSTIC      Description: Dilation And Curettage;  Recorded: 07/12/2012;     [place] EXCISE ADRENAL GLAND      Description: Adrenalectomy Unilateral Right Sided;  Recorded: 07/12/2012;  Comments: CORTISOL-SECRETING ADENOMA, S/P HEMORRHAGE.         MEDICATIONS:  Current Outpatient Prescriptions   Medication Sig Dispense Refill     cholecalciferol, vitamin D3, (VITAMIN D3) 2,000 unit Tab Take 2000 IU daily  0     chromium 200 mcg Tab Take 400mg once daily or 200 mg twice daily (at snack times) 30 each 0     gabapentin (NEURONTIN) 300 MG capsule Increase to 3 tablets three times a day as instructed. 270 capsule 1     multivitamin (ONE A DAY) per tablet Take 1 tablet by mouth daily. 120 tablet 2     OMEGA-3/DHA/EPA/FISH OIL (FISH OIL-OMEGA-3 FATTY ACIDS) 300-1,000 mg capsule Take 2 capsules (2 g total) by mouth daily.  0     phentermine (ADIPEX-P) 37.5 mg tablet 1/2 tab po bid 40 tablet 0     valACYclovir (VALTREX) 1000 MG tablet Take 1 tablet (1,000 mg total) by mouth daily. 30 tablet 0     No current facility-administered medications for this visit.          ALLERGIES/SENSITIVITIES:     Allergies   Allergen Reactions     Morphine Hives and Itching       PERTINENT SOCIAL HISTORY:   Social History     Social History     Marital status:      Spouse name: N/A     Number of children: N/A     Years of education: N/A     Social History Main Topics     Smoking status: Never Smoker     Smokeless tobacco: Never Used     Alcohol use No     Drug use: No     Sexual activity: Yes     Partners: Male       FAMILY HISTORY:  Family History   Problem Relation Age of Onset     Hypertension Father         PHYSICAL EXAM:   Constitutional: /74  Pulse 68  Resp 18    General appearance: Appropriately groomed.  No acute distress.  Interactive.     Mental Status: Mental status: Alert and oriented, mood and affect appropriate, language reception and expression normal, recent and remote  memory is normal, higher cortical function normal. Attention span, concentration and ability to follow commands is normal.       Cranial Nerves: Face is symmetric.  Extraocular movements are full, conjugate and without nystagmus.  Hearing is mildly reduced.  shoulder position is symmetric.  Tongue is midline with normal motion.  Palate elevates symmetrically.  He does have a decreased gag reflex on both sides.  Uvula does point to the left.     Motor: Motor exam nl bilateral UE. Tone nl, bulk nl and strength 5/5 all groups.      Sensory: Sensory exam by subjective report intact to LT,PP,Position and Vib. in the UE and  LE.     Station and Gait:  Station and Gait- nl stride length,  balance and sparkle..     Reflexes; she does have some hyperreflexia and an absence of her abdominal reflexes on both sides.    IMAGING:  I have personally reviewed the images and discussed the findings with Lia Lemon.  On MRI she has low-lying tonsils with CSF flow study across the abdomen magnum shows dampened posterior flow.  Is some pointing of the tonsils.  MRI of the cervical spine is stable from that compared to 2015.  There is straightening of the normal cervical lordosis.  No new disc herniation or significant right or left-sided foraminal narrowing.    EMG shows severe right median neuropathy at the wrist.  There is a mild left median neuropathy at the wrist.  There is no evidence for an ulnar neuropathy.    CC:     [facility], [id number] [place] [address]

## 2021-06-15 NOTE — PROGRESS NOTES
[facility] Daily Progress     Patient Name: Lia Lemon  Date: 1/15/2018  Visit #: 2  Referral Diagnosis: neck and LBP  Referring provider: Alicia Kenney MD  Visit Diagnosis:     ICD-10-CM    1. Decreased range of motion of wrist M25.639    2. 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 Median nerve dysfunction, right G56.11    4. Poor posture R29.3    5. Cervicalgia M54.2          Assessment:   Patient noting relief following the manual therapy. She demonstrates compliance with the home exercises.    HEP/POC compliance is  good .  Patient is appropriate to continue with skilled physical therapy intervention, as indicated by initial plan of care.    Goal Status:  Pt. will be independent with home exercise program in : 4 weeks  Pt will: decrease NDI score by 30% in 8 weeks  Pt will: reduce waking episodes at night by 30% in 8weeks  Pt will: decrease post work pain < 4/10 pain in 8 weeks    Plan / Patient Education:     Continue with initial plan of care.  Progress with home program as tolerated.    Subjective:     Pain Rating: 3  Patient reports to feel pretty goos  Consistent with exercise. No concerns or questions  She asks about acupuncture.      Objective:     Exercises:  Exercise #1: Unilateral pectoral stretch x 30 seconds/arm  Comment #1: wrist flexion and extension stretch x 20 seconds  Exercise #2: median nerve glide x10 reps  Comment #2: scapular retraction x 10 hold 5 seconds  Exercise #3: seated cervical sidebending stretch x 20 seconds  Comment #3: fish median nerve glide x 5      Treatment Today     TREATMENT MINUTES COMMENTS   Evaluation     Self-care/ Home management     Manual therapy 15 Patient positioned in prone- PA mobilization to thoracic spine, TPR to R UT> L. MFR to C-T junction.  Patient positioned in supine- Manual median nerve mobilization B, TPR to R UT, B UE release.   Neuromuscular Re-education     Therapeutic Activity     Therapeutic Exercises 15 See flow sheet    Gait training     Modality__________________                Total 30    Blank areas are intentional and mean the treatment did not include these items.       Debby Zhou  1/15/2018

## 2021-06-15 NOTE — PROGRESS NOTES
[facility]   Cervical Thoracic Initial Evaluation    Patient Name: Lia Lemon  Date of evaluation: 1/8/2018  Referral Diagnosis: Neck pain  Referring provider: Juan Luis Lara*  Visit Diagnosis:     ICD-10-CM    1. Decreased range of motion of wrist M25.639    2. 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 Median nerve dysfunction, right G56.11    4. Poor posture R29.3    5. Cervicalgia M54.2        Assessment:    Patient presenting with R sided neck and parascapular pain of a chronic nature. She reports her symptoms to be gradually worsening and denies any history of injury or trauma. Although she has had and EMG to determine CTS she has not been given exercises to address. PT to add to POC while she waits for neuro consult. PT exam reveals, B median neural tension, poor posture, Wrist flexion and extension loss L > R. PT to initiate manual therapy and HEP. PT established goals in agreement with the patient.  Pt. is appropriate for skilled PT intervention as outlined in the Plan of Care (POC).    Goals:  Pt. will be independent with home exercise program in : 4 weeks  Pt will: decrease NDI score by 30% in 8 weeks  Pt will: reduce waking episodes at night by 30% in 8weeks  Pt will: decrease post work pain < 4/10 pain in 8 weeks    Patient's expectations/goals are realistic.    Barriers to Learning or Achieving Goals:  No Barriers.       Plan / Patient Instructions:        Plan of Care:   Communication with: Referral Source;Patient Caregiver  Patient Related Instruction: Nature of Condition;Treatment plan and rationale;Basis of treatment;Posture  Times per Week: 1  Number of Weeks: 12  Number of Visits: 12  Therapeutic Exercise: Strengthening;Stretching  Neuromuscular Reeducation: posture;balance/proprioception  Manual Therapy: soft tissue mobilization;myofascial release;joint mobilization  Modalities: TENS    Plan for next visit:  Initiate manual treatment,      Subjective:         History  of Present Illness:    Lia is a 37 y.o. female who presents to therapy today with complaints of ongoing neck pain and hand numbness. Of note, she has also [facility] L elbow. 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 She is a nurse in the heart care division.   Currently her pain is more R sided in the R thoracic and neck. The pain is constant and worsens as the day goes on.    She notes the numbness is more at night and does note sleeping in wrist flexion.    The L elbow increases with lifting and gripping on the L.    Worse with: worse as the day goes on, worse with lifting, worse numbness at night  Better with: movement    Pain Ratin  Pain rating at best: 0  Pain rating at worst: 7  Pain description: numbness, pain and soreness    Functional limitations are described as occurring with:   sitting at work  standing at work    Patient reports benefit from:  movement or exercise , anti-inflammatory         Objective:      Note: Items left blank indicates the item was not performed or not indicated at the time of the evaluation.      Patient Outcome Measures :    Neck Disability Score in %: 22   Scores range from 0-100%, where a score of 0% represents minimal pain and maximal function. The minimal clinically important difference is a score reduction of 10%.    Cervical Thoracic Examination  1. Decreased range of motion of wrist     2. Median nerve dysfunction, left     3. Median nerve dysfunction, right     4. Poor posture     5. Cervicalgia       Involved side: Left and Bilateral  Posture Observation:      General sitting posture is  fair.  Shoulder/Thoracic complex: Moderate bilateral scapular protraction  and elevation as well     Cervical ROM:    Date: 18     *Indicate scale AROM AROM AROM   Cervical Flexion 57     Cervical Extension 72      Right Left Right Left Right Left   Cervical Sidebending 28 27       Cervical Rotation 67 70       Wrist  Extension R 55 L 70     Wrist Flexion R 63 L 66       Strength     Date: 1/8/18     Cervical Myotomes/5 Right Left Right Left Right Left   Cervical Flexion (C1-2) 5 5       Cervical Sidebending (C3) 5 5       Shoulder Elevation (C4) 5 5       Shoulder Abduction (C5) 5 4+       Elbow Flexion (C6) 5 5       Elbow Extension (C7) 5 5       Wrist Flexion (C7)         Wrist Extension (C6)         Thumb abduction (C8)         Finger Abduction (T1)           Sensation   Equal and normal B    Reflex Testing  Cervical Dermatomes Right Left UE Reflexes Right Left   Back of the Head (C2)   Biceps (C5-6)     Supraclavicular Fossa (C3)   Brachioradialis (C5-6)     AC Joint (C4)   Triceps (C7-8)     Lateral Biceps (C5)   Christian s test - -   Palmar Thumb (C6)   LE Reflexes     Palmar 3rd Finger (C7)   Patellar (L3-4)     Palmar 5th Finger (C8)   Achilles (S1-2)     Ulnar Forearm (T1)   Babinski Response         Flexibility: wrist! See above    Palpation: B UT elevation and tone    Passive Mobility-Joint Integrity: WNL.    Cervical Special Tests     Cervical Special Tests Right Left UE Nerve Mobility Right Left   Cervical compression   Median nerve + +   Cervical distraction   Ulnar nerve     Spurling s test - - Radial nerve     Shoulder abduction sign   Thoracic outlet     Deep neck flexor endurance test   Nelson R 30 seconds L 105 seconds, barely tingling   Upper cervical rotation   Adson s     Sharper-Jose Raul   Cervical rotation lateral flexion     Alar ligament test   Other: PIN  +   Other:  strength 67 45 Other:       UE Screen: ROM WFL's    Treatment Today     TREATMENT MINUTES COMMENTS   Evaluation 20 Low complexity   Self-care/ Home management     Manual therapy     Neuromuscular Re-education     Therapeutic Activity     Therapeutic Exercises 25 Pt and patient discussed her symptoms at length, she has never had formal PT before. PT and Patient discussed the importance of changing her sleeping position to decrease  irritation   Gait training     Modality__________________                Total 45    Blank areas are intentional and mean the treatment did not include these items.     PT Evaluation Code: (Please list factors)  Patient History/Comorbidities: CTS  Examination: cervical and UE  Clinical Presentation: stable  Clinical Decision Making: low    Patient History/  Comorbidities Examination  (body structures and functions, activity limitations, and/or participation restrictions) Clinical Presentation Clinical Decision Making (Complexity)   No documented Comorbidities or personal factors 1-2 Elements Stable and/or uncomplicated Low   1-2 documented comorbidities or personal factor 3 Elements Evolving clinical presentation with changing characteristics Moderate   3-4 documented comorbidities or personal factors 4 or more Unstable and unpredictable High                Debby Zhou  1/8/2018  10:37 AM

## 2021-06-15 NOTE — PROGRESS NOTES
Neurosurgery consultation was requested by: Dr. Newton for evaluation of Chiari I malformation and Bilateral CTR  Pain: presents in both hands and arms L>R, frequent posterior HA  Radicular Pain is absent  Lhermitte sign: denies  Motor complaints: decreased dexterity  Sensory complaints: numbness in the throat and asophagus  Gait and balance issues: absent  Bowel or bladder issues: denies  Duration of SX is: chronic  The symptoms are worse with: ROM  The symptoms are better with: rest  Injury: denies  Severity is: moderate  Patient has tried the following conservative measures: analgesics  Tanya White RN, CNRN

## 2021-06-15 NOTE — PROGRESS NOTES
[facility] Daily Progress     Patient Name: Lia Lemon  Date: 1/29/2018  Visit #: 4  Referral Diagnosis: neck and LBP  Referring provider: Alicia Kenney MD  Visit Diagnosis:     ICD-10-CM    1. Decreased range of motion of wrist M25.639    2. 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 Median nerve dysfunction, right G56.11    4. Poor posture R29.3    5. Cervicalgia M54.2          Assessment:   Patient noting relief following the manual therapy. She demonstrates compliance with the home exercises. PT to continue with services until the patient can be seen by Dr. Hennessy.    HEP/POC compliance is  good .  Patient is appropriate to continue with skilled physical therapy intervention, as indicated by initial plan of care.    Goal Status:  Pt. will be independent with home exercise program in : 4 weeks  Pt will: decrease NDI score by 30% in 8 weeks  Pt will: reduce waking episodes at night by 30% in 8weeks  Pt will: decrease post work pain < 4/10 pain in 8 weeks    Plan / Patient Education:     Continue with initial plan of care.  Progress with home program as tolerated.    Subjective:     Pain Rating: 3  Patient states that the exercises feel good. She is also noting improvement with the manual therapy. Patient is scheduled for the MRI on Wednesday.    Objective:    PT focused on the manual therapy this visit due to late arrival.    Exercises:  Exercise #1: Unilateral pectoral stretch x 30 seconds/arm  Comment #1: wrist flexion and extension stretch x 20 seconds  Exercise #2: median nerve glide x10 reps  Comment #2: scapular retraction x 10 hold 5 seconds  Exercise #3: seated cervical sidebending stretch x 20 seconds  Comment #3: fish median nerve glide x 5  Exercise #4: Greenband shoulder extension x 10 b      Treatment Today     TREATMENT MINUTES COMMENTS   Evaluation     Self-care/ Home management     Manual therapy 25 Patient positioned in prone- PA mobilization to thoracic spine, TPR to R UT>  L. MFR to C-T junction.   Patient positioned in supine- Manual median nerve mobilization B, TPR to R UT, B UE and carpal tunnel release. MFR to pec major.    Noted improvement in tone and elevation of B scapulas.   Neuromuscular Re-education     Therapeutic Activity     Therapeutic Exercises 5 See flow sheet   Gait training     Modality__________________                Total 30    Blank areas are intentional and mean the treatment did not include these items.       Debby Zhou  1/29/2018

## 2021-06-15 NOTE — PROGRESS NOTES
Patient presents at the request of Dr. Hennessy for bilateral upper extremity EMG.  She reports bilateral hand numbness and tingling digits 1-4.  Worse at night and with activity such as doing her hair.  She is right-handed.  Recently the left arm/forearm has been worse than the right but she has some tennis elbow issues.  Reports EMG done in the past which was reviewed showing reported severe median neuropathy at the wrist on the right and mild left however on my review of the numbers this appears to be mild bilateral.    EMG/NCS  results: Please see scanned full report.    Comment NCS: Abnormal study:    1.  Borderline bilateral median  transcarpal latencies.  2.  Prolonged bilateral median versus ulnar transcarpal latency comparison.  3.  Remainder bilateral upper extremity nerve conduction studies including right median and ulnar sensory, bilateral median CMAP's, bilateral  and ulnar CMAPs and transcarpal studies normal.    Comment EMG: Normal study.  1.  Normal needle EMG bilateral upper extremities.    Interpretation: Abnormal study:    1.  Bilateral median neuropathy at the wrist consistent with entrapment in the carpal tunnel, very mild in severity.    2. There is no electrodiagnostic evidence of cervical radiculopathy, brachial plexopathy, or ulnar neuropathy in the bilateral upper extremities.    The testing was completed in its entirety by the physician.      It was our pleasure caring for your patient today, if there any questions or concerns please do not hesitate to contact us.

## 2021-06-16 PROBLEM — F50.812 BINGE-EATING DISORDER, SEVERE: Chronic | Status: ACTIVE | Noted: 2018-05-23

## 2021-06-16 PROBLEM — I10 ESSENTIAL HYPERTENSION: Status: ACTIVE | Noted: 2019-02-04

## 2021-06-16 PROBLEM — M89.8X1 PAIN OF RIGHT SCAPULA: Status: ACTIVE | Noted: 2019-02-04

## 2021-06-16 PROBLEM — M54.6 ACUTE MIDLINE THORACIC BACK PAIN: Status: ACTIVE | Noted: 2019-02-04

## 2021-06-16 NOTE — PROGRESS NOTES
I called and spoke with patient regarding the results of her EMG as well as her consult with Dr. Hennessy.  These are both done in the early part of February.  I was wondering if she had had follow-up or what the plan was from here in terms of the next step in her treatment.  She notes that she was told that the EMG was only mildly abnormal and therefore is planning to see Dr. Hennessy in follow-up primarily for the Chiari malformation and discussion regarding that.  Initially Dr. Hennessy had thought that she probably had some significant carpal tunnel symptoms that may be needed to be repaired however with the EMG showing only mild problems that is a little bit different and so she did not feel the need to go immediately for follow-up with Dr. Hennessy.  I did ask her to set up an appointment to follow-up with Dr. Hennessy in regards to the Chiari malformation as well as for discussion regarding the results of the EMG and to solidify a plan of care.  She agrees with that and had planned to make an appoint with Dr. Hennessy when she is back from vacation.  If she needs any additional assistance from me she certainly will let me know.

## 2021-06-16 NOTE — PROGRESS NOTES
ASSESSMENT AND PLAN:  Problem List Items Addressed This Visit     BMI 29.0-29.9,adult     Started weaning Phentermine 1/24/2018.  Taking 5/7d per week. Will continue that now (3/20/2018) as it seems to be working for her and she continues to lose weight.  She is congratulated today because her BMI has now dropped into the overweight range and she is no longer in the obese range     Her heaviest weight would have qualified her for weight loss surgery so I will keep that in mind if she is having difficulty with her weight at this point.     Plan follow up in 2 months.                  Chief Complaint   Patient presents with     Weight Loss     HPI  Lia Lemon is a 38 y.o. female says she just got back from a vacation in [place] with her  and it went really well.  She has been doing really well weaning off of the phentermine.  The first 2 months taking 5 tablets per week instead of 7 tablets per week did not really make much of a difference.  She says she does not get excessively hungry on the days that she is not taking the phentermine.  She feels like the weaning process is going well.    History   Smoking Status     Never Smoker   Smokeless Tobacco     Never Used      Current Outpatient Prescriptions   Medication Sig Dispense Refill     cholecalciferol, vitamin D3, (VITAMIN D3) 2,000 unit Tab Take 2000 IU daily  0     chromium 200 mcg Tab Take 400mg once daily or 200 mg twice daily (at snack times) 30 each 0     gabapentin (NEURONTIN) 300 MG capsule Increase to 3 tablets three times a day as instructed. 270 capsule 1     multivitamin (ONE A DAY) per tablet Take 1 tablet by mouth daily. 120 tablet 2     OMEGA-3/DHA/EPA/FISH OIL (FISH OIL-OMEGA-3 FATTY ACIDS) 300-1,000 mg capsule Take 2 capsules (2 g total) by mouth daily.  0     phentermine (ADIPEX-P) 37.5 mg tablet 1/2 tab po bid 40 tablet 0     valACYclovir (VALTREX) 1000 MG tablet Take 1 tablet (1,000 mg total) by mouth daily. 30 tablet 0     No  current facility-administered medications for this visit.      Allergies   Allergen Reactions     Morphine Hives and Itching     Review of Systems   Constitutional: Negative.    HENT: Negative.    Eyes: Negative.    Respiratory: Negative.    Cardiovascular: Negative.    Gastrointestinal: Negative.    Endocrine: Negative.    Genitourinary: Negative.    Musculoskeletal: Negative.    Skin: Negative.    Neurological: Negative.    Hematological: Negative.    Psychiatric/Behavioral: Negative.       OBJECTIVE: ./84  Pulse 88  Resp 14  Wt 177 lb (80.3 kg)  BMI 29.91 kg/m2  Physical Exam   Constitutional: She is oriented to person, place, and time. She appears well-developed and well-nourished. No distress.   HENT:   Head: Normocephalic and atraumatic.   Eyes: Conjunctivae are normal.   Neck: Neck supple.   Cardiovascular: Normal rate and regular rhythm.    Pulmonary/Chest: Effort normal.   Musculoskeletal: Normal range of motion.   Neurological: She is alert and oriented to person, place, and time.   Skin: Skin is warm and dry.   Psychiatric: She has a normal mood and affect.

## 2021-06-16 NOTE — TELEPHONE ENCOUNTER
Telephone Encounter by Andressa Carter at 1/24/2019  2:32 PM     Author: Andressa Carter Service: -- Author Type: --    Filed: 1/24/2019  2:33 PM Encounter Date: 1/22/2019 Status: Signed    : Andressa Carter       PA INITIATION

## 2021-06-16 NOTE — TELEPHONE ENCOUNTER
Telephone Encounter by Andressa Carter at 1/25/2019  3:42 PM     Author: Andressa Carter Service: -- Author Type: --    Filed: 1/25/2019  3:43 PM Encounter Date: 1/22/2019 Status: Signed    : Andressa Carter APPROVED:    Approval start date:  1/21/19  Approval end date: 1/21/20    Pharmacy has been notified of approval and will contact patient when medication is ready for pickup.

## 2021-06-17 NOTE — PROGRESS NOTES
Lia Lemon was last seen on 2/2/2018 for HA and bilateral UE pain, paresthesias and weakness associated with Chiari I malformation and bilateral CTS.  Today she returns in follow up after EMG. She denies any change of symptoms in the interim.  NDI score is 28%  Tanya White RN, CNRN

## 2021-06-17 NOTE — PROGRESS NOTES
Lia Lemon was last seen on 2/2/2018 for HA and bilateral UE pain, paresthesias and weakness associated with Chiari I malformation and bilateral CTS.    Today she returns in follow up after EMG. She denies any change of symptoms in the interim.  NDI score is 28%    As suspected, she does have confirmed bilateral median neuropathy at the wrist.  This is mild but can be very symptomatic.  She also has reduced flow behind the tonsils on her cine flow study.    I discussed, at length, ways to sort all of these symptoms out.  She may be symptomatic from her Chiari, however, her symptoms may last all day which would put her into a mixed headache syndrome.  She does have pain along a C6 distribution that would be consistent with carpal tunnel syndrome.    We discussed all these options and I have recommended that she use the wrist splints on one hand to see if her symptoms in the shoulder and neck improve.  Her symptoms are worse on the right side and I suggested that she wear the brace on the right side for a few nights.    She will be taking this under consideration and discuss with her .  I spent 15 minutes in review of information, documentation and face-to-face discussion.    Do Hennessy MD, FACS, FAANS

## 2021-06-17 NOTE — PROGRESS NOTES
"MTM Consult Encounter    Lia Lemon is a 41 y.o. female referred for a clinical pharmacist consult from Dr. Cline for Saxenda education.     Patient consented to a telehealth visit: Yes  Discussion:     Patient is prescribed Saxenda for weight loss. Medication education and counseling was provided, including indication, expected effect, dosing instructions, storage, and possible side effects. Patient  verbalized understanding of injection technique. The patient's questions were answered.     Plan:  1. Start Saxenda. Inject 0.6 mg once a day for 1 week,   then increase to 1.2 mg injected once a day for 1 week,   then increase to 1.8 mg injected once a day for 1 week,   then increase to 2.4 mg injected once a day for 1 week,   then increase to 3 mg injected once a day.      Follow up:   As scheduled with Dr. Son Espino, PharmD, [facility]  Medication Management (MTM) Pharmacist  [facility]       Current Outpatient Medications   Medication Sig Dispense Refill     cholecalciferol, vitamin D3, (VITAMIN D3) 2,000 unit Tab Take 2000 IU daily  0     cyclobenzaprine (FLEXERIL) 10 MG tablet Take 1 tablet (10 mg total) by mouth 2 (two) times a day as needed for muscle spasms. 20 tablet 0     hydroCHLOROthiazide (HYDRODIURIL) 25 MG tablet TAKE 1 TABLET BY MOUTH EVERY DAY 90 tablet 0     liraglutide, weight loss, (SAXENDA) 3 mg/0.5 mL (18 mg/3 mL) PnIj Inject 3 mg under the skin daily. 6 Syringe 3     lisinopriL (PRINIVIL,ZESTRIL) 10 MG tablet TAKE 1 TABLET BY MOUTH EVERY DAY 90 tablet 3     multivit-min/ferrous fumarate (MULTI VITAMIN ORAL) Take by mouth.       naltrexone (DEPADE) 50 mg tablet Take 0.5 tablets (25 mg total) by mouth daily. 15 tablet 0     OMEGA-3/DHA/EPA/FISH OIL (FISH OIL-OMEGA-3 FATTY ACIDS) 300-1,000 mg capsule Take 2 capsules (2 g total) by mouth daily.  0     pen needle, diabetic (BD ULTRA-FINE ELIZA PEN NEEDLE) 32 gauge x 5/32\" Ndle USE WITH " LIRAGULATIDE 100 each 4     valACYclovir (VALTREX) 1000 MG tablet        No current facility-administered medications for this visit.

## 2021-06-17 NOTE — PROGRESS NOTES
[facility] Discharge Summary  Patient Name: Lia Lemon  Date: 4/30/2018  Referral Diagnosis: neck pain  Referring provider: Juan Luis Lara*  Visit Diagnosis:   1. Decreased range of motion of wrist     2. Median nerve dysfunction, left     3. Median nerve dysfunction, right     4. Poor posture     5. Cervicalgia         Goals:  See below    Patient was seen for 4 visits from 21/8/18 to 1/29/18 with 0 missed appointments.  The patient attended therapy initially, but did not finish the therapy sessions prescribed.  Goals were not fully achieved. Explanation for goals not achieved: Patient NS'd last appointment    Therapy will be discontinued at this time.  The patient will need a new referral to resume.    Thank you for your referral.  Debby Zhou  4/30/2018  2:35 PM

## 2021-06-17 NOTE — PROGRESS NOTES
ASSESSMENT AND PLAN:     Problem List Items Addressed This Visit        High    Class 3 severe obesity due to excess calories with serious comorbidity and body mass index (BMI) of 40.0 to 44.9 in adult (H) - Primary     Worsening.   rx liraglutide today.   Not interested in weight loss surgery. Decided to watch bariatric surgery video.   Not interested in 24 week program.     Dx: Initial BMI 42.18 with comorbid weight related conditions including chronic pain, hyperlipidemia, and vitamin D deficiency.  She also appears to have severe binge eating disorder.    Initial Weight: 253.5 lbs bmi 42.18, 3/30/2017  Weight: 172 lb (78 kg) bmi 29.52, 5/23/2018   Weight: 178 lb (80.7 kg) bmi 30.55 7/24/2018   Weight: 204 lb (92.5 kg) Body mass index is 35.02 kg/m . 12/13/2018   Weight: 201 lb (91.2 kg) bmi 34.50, 1/16/2019   Weight: 200 lb 11.2 oz (91 kg) bmi 34.45, 2/14/2019   Weight: 206 lb 8 oz (93.7 kg) , bmi 35.45, 4/9/2019   Weight: 211 lb 1.6 oz (95.8 kg), bmi 36.24, 7/9/2019   Weight: 207 lb 6.4 oz (94.1 kg), bmi 35.60, 7/23/2019   Weight: 205 lb (93 kg), bmi 35.19, 8/11/2019   Weight: (!) 242 lb 14.4 oz (110.2 kg) - bmi 40.42  Weight loss from initial: 10.6  % Weight loss: 4.18 %    PHENTERMINE 37.5 MG PO Q DAY  3/30/2018 -1/24/2018 - 73 LB DOWN     PHENTERMINE 37.5MG PO Q DAY 5/7 D PER WEEK  1/24/2018 - 5/23/2018 - 8 LBS ADDITIONALLY DOWN (TOTAL 81.5 LB DOWN)  Dose decreased 5/2018     PHENTERMINE 37.5 MG PO 3/7 D PER WEEK  5/23/2018 - 7/24/2018 - gained 6 lbs.   Dc phentermine 7/24/2018       CONTRAVE 2 PILLS two times a day (START SLOWLY)  7/24/2018 - - SELF DISCONTINUED.      NALOXONE  8/9/19 - start naloxone (plan to add wellbutrin if desired in future to simulate contrave with less cost).    4/30/2021 she says she never tried this.     Vyvanse 30 MG orally per morning-  indication-  severe binge eating disorder.   12/13/2018 - 1/16/2019 - she finds it helpful.   1/16/2019 INCREASED DOSE     VYVANSE 30 MG PO two  "times a day  1/16/2019 - INCREASED DOSE FROM 30 MG PO Q DAY.  Watch pvcs.     LIRAGLUTIDE -  1/16/2019 - DISCUSSED.  4/30/2021 saxenda rx sent, mtm team tagged            Relevant Medications    liraglutide, weight loss, (SAXENDA) 3 mg/0.5 mL (18 mg/3 mL) PnIj    pen needle, diabetic (BD ULTRA-FINE ELIZA PEN NEEDLE) 32 gauge x 5/32\" Ndle    Other Relevant Orders    Ambulatory referral to Medication Management       Unprioritized    Chronic pain (Chronic)     Chronic neck and back pain, weight reduction recommended.          Hyperlipidemia (Chronic)     Managed by PCP   Weight reduction recommended see bmi in problem list.          Essential hypertension        BP Readings from Last 3 Encounters:   04/30/21 138/90   10/14/20 132/82   10/03/19 136/77      Managed by PCP   Weight reduction recommended see bmi in problem list.          Acute midline thoracic back pain     Chronic neck and back pain, weight reduction recommended.            Other Visit Diagnoses     Class 2 obesity with alveolar hypoventilation, serious comorbidity, and body mass index (BMI) of 35.0 to 35.9 in adult (H)               Chief Complaint   Patient presents with     Weight Loss     Restart        HPI  Lia Lemon is a 41 y.o. female comes in to restart weight loss program.     Social History     Tobacco Use   Smoking Status Never Smoker   Smokeless Tobacco Never Used      Current Outpatient Medications on File Prior to Visit   Medication Sig Dispense Refill     lisinopriL (PRINIVIL,ZESTRIL) 10 MG tablet TAKE 1 TABLET BY MOUTH EVERY DAY 90 tablet 3     cholecalciferol, vitamin D3, (VITAMIN D3) 2,000 unit Tab Take 2000 IU daily  0     cyclobenzaprine (FLEXERIL) 10 MG tablet Take 1 tablet (10 mg total) by mouth 2 (two) times a day as needed for muscle spasms. 20 tablet 0     hydroCHLOROthiazide (HYDRODIURIL) 25 MG tablet TAKE 1 TABLET BY MOUTH EVERY DAY 90 tablet 0     multivit-min/ferrous fumarate (MULTI VITAMIN ORAL) Take by mouth.       " "naltrexone (DEPADE) 50 mg tablet Take 0.5 tablets (25 mg total) by mouth daily. 15 tablet 0     OMEGA-3/DHA/EPA/FISH OIL (FISH OIL-OMEGA-3 FATTY ACIDS) 300-1,000 mg capsule Take 2 capsules (2 g total) by mouth daily.  0     valACYclovir (VALTREX) 1000 MG tablet        No current facility-administered medications on file prior to visit.       Allergies   Allergen Reactions     Morphine Hives and Itching     Can take vicodin and percocet       OBJECTIVE: /90   Pulse 100   Resp 16   Ht 5' 5\" (1.651 m)   Wt (!) 242 lb 14.4 oz (110.2 kg)   BMI 40.42 kg/m    Physical Exam  Constitutional:       General: She is not in acute distress.     Appearance: Normal appearance. She is well-developed. She is obese.   HENT:      Head: Normocephalic and atraumatic.   Eyes:      Conjunctiva/sclera: Conjunctivae normal.   Neck:      Musculoskeletal: Neck supple.   Cardiovascular:      Rate and Rhythm: Normal rate and regular rhythm.   Pulmonary:      Effort: Pulmonary effort is normal.   Musculoskeletal: Normal range of motion.   Neurological:      Mental Status: She is alert and oriented to person, place, and time.          This note was created using Dragon dictation.  Please excuse any grammatical errors.   "

## 2021-06-17 NOTE — TELEPHONE ENCOUNTER
prescribed Saxenda on this patient today during a visit.      Please start prior auth.       Rosangela Melendez, URMILA 4/30/2021 3:47 PM   Holly TEJEDA

## 2021-06-17 NOTE — TELEPHONE ENCOUNTER
Telephone Encounter by Esperanza Correa at 5/7/2021  8:30 AM     Author: Esperanza Correa Service: -- Author Type: --    Filed: 5/7/2021  8:30 AM Encounter Date: 4/30/2021 Status: Signed    : Esperanza Correa       PA is not needed. Medication is refill too soon.

## 2021-06-17 NOTE — PATIENT INSTRUCTIONS - HE
Recommendations from today's Medication Management (MTM) visit:                                                      Start Saxenda. Inject 0.6 mg once a day for 1 week,   then increase to 1.2 mg injected once a day for 1 week,   then increase to 1.8 mg injected once a day for 1 week,   then increase to 2.4 mg injected once a day for 1 week,   then increase to 3 mg injected once a day.      Additional resources on your weight loss journey are available at [employer].     Created to work along with Saxenda, [facility] is more than just tips--it uses scientifically proven weight-loss and maintenance strategies. [employer] will support you with exclusive access to personalized web pages, emails, calls from your  and more.    To schedule another MTM appointment, please call the clinic directly or you may call   the MTM scheduling line at [phone] or toll-free at [phone].     My MTM pharmacist's contact information:                                                      It was a pleasure speaking with you today. Please feel free to contact me with any questions or concerns you have.      Erika Espino, PharmD, BCACP  Medication Management (MTM) Pharmacist  [facility]     You may receive a survey about the MTM services you received by email, text, and/or US Mail.  I would appreciate your feedback to help me serve you better in the future. Your comments will be anonymous.

## 2021-06-17 NOTE — PROGRESS NOTES
Assessment/Plan:     Assessment: Lia Lemon is a 38 y.o. female who presents for pre-op history and physical. She is scheduled to undergo a bilateral carpel tunnel repair.      Plan:    She appears to be medically optimized for the planned procedure.   . Labs were done as indicated below.  Recommendations were reviewed with the patient. Copy of the pre-op was given to the patient to bring along on the day of surgery.   SHE WILL STOP ALL VITAMINS AND SUPPLEMENTS NOW PRIOR TO SURGERY. SHE WILL STOP HER PHENTERAMINE PRIOR TO PROCEDURE AS WELL.  The patient is approved for surgery and is considered to be low anesthetic risk.   Follow up as needed.    Please page me at [phone] if you have any questions or concerns regarding the care of this patient.     Subjective:     Scheduled Procedure: Bilateral Carpel Tunnel repair, RIGHT AND THEN LEFT.   Surgery Date:  05/14/18 on the right, will then get left one done soon after  Surgery Location:  [facility]  Surgeon:  Dr Hennessy    Current Outpatient Prescriptions   Medication Sig Dispense Refill     cholecalciferol, vitamin D3, (VITAMIN D3) 2,000 unit Tab Take 2000 IU daily  0     chromium 200 mcg Tab Take 400mg once daily or 200 mg twice daily (at snack times) 30 each 0     phentermine (ADIPEX-P) 37.5 mg tablet 1/2 tab po bid 40 tablet 0     valACYclovir (VALTREX) 1000 MG tablet Take 1 tablet (1,000 mg total) by mouth daily. 30 tablet 0     OMEGA-3/DHA/EPA/FISH OIL (FISH OIL-OMEGA-3 FATTY ACIDS) 300-1,000 mg capsule Take 2 capsules (2 g total) by mouth daily.  0     No current facility-administered medications for this visit.        Allergies   Allergen Reactions     Morphine Hives and Itching     Can take vicodin and percocet       Immunization History   Administered Date(s) Administered     DTP 1980, 1980, 1981, 09/07/1982, 09/03/1985     DTaP, historic 12/28/2015     Hep B, Peds or Adolescent 09/14/1993, 11/02/1993, 04/26/1994     Influenza [id number],  2009     Influenza, Seasonal, Inj PF IIV3 2011, 2012, 10/10/2013, 10/04/2016     Influenza, inj, historic,unspecified 10/27/2014, 10/15/2015     Influenza,seasonal, Inj IIV3 10/27/2014, 10/08/2015     MMR 1982, 1993     OPV,Trivalent,Historic(8021-8731 only) 1981, 1981, 1982, 1985     Rho (D) Immune Globulin 2011     Td, Adult, Absorbed 1982, 1985, 1997     Td, adult adsorbed, PF 2006     Tdap 2011, 2015       Patient Active Problem List   Diagnosis     Palpitations     Abdominal Pain     Joint pain localized in the shoulder      Vaginal delivery     BMI 29.0-29.9,adult     Chronic pain     Vitamin D deficiency disease     Hyperlipidemia       Past Medical History:   Diagnosis Date     Abdominal pain      Chronic pain      Hyperlipidemia      Normal delivery     x4     Palpitations      Shoulder joint pain      Vitamin D deficiency        Social History     Social History     Marital status:      Spouse name: Macario     Number of children: 5     Years of education: N/A     Occupational History     RN Manager      [employer]     Social History Main Topics     Smoking status: Never Smoker     Smokeless tobacco: Never Used     Alcohol use 3.0 oz/week     5 Standard drinks or equivalent per week     Drug use: No     Sexual activity: Yes     Partners: Male     Birth control/ protection: Condom     Other Topics Concern     Not on file     Social History Narrative       Past Surgical History:   Procedure Laterality Date     ADRENALECTOMY Right     CORTISOL-SECRETING ADENOMA, S/P HEMORRHAGE.      SECTION      first child     DILATION AND CURETTAGE OF UTERUS  2006       Family History   Problem Relation Age of Onset     Hypertension Father      Heart disease Father      MI, a fib, cardiac ablation, ICD         HPI: Lia is a 38 y.o. female here for a pre-operative consultation. The exam is  requested by Dr. Hennessy in preparation for bilateral carpel tunnel surgery to [facility] for the right and left TBD. Today s examination on 5/7/2018 is done to review the underlying surgical condition of hand numbness and weakness in her hands as well as to clear for anesthesia and review her medical problems and make appropriate changes in medications. Patient has had hand numbness for the past 3 years or so. This has gotten much worse over the past 7-8 months. She had EMG which showed bilateral carpel tunnel issues and she now presents for surgical repair of such. She plans to have her right hand done on 5/18/18 and then will get left hand done soon after once she has recovered from the right hand surgery.  She is otherwise a very healthy female. She is on multiple vitamins and supplements which she will stop now in anticipation of surgery.     History of Present Illness  Recent Health  Fever: no  Chills: no  Fatigue: no  Chest Pain: no  Cough: no  Dyspnea: no  Urinary Frequency: no  Nausea: no  Vomiting: no  Diarrhea: no  Abdominal Pain: no  Easy Bruising: no  Lower Extremity Swelling: no  Poor Exercise Tolerance: no    Pertinent History  Prior Anesthesia: yes  Previous Anesthesia Reaction:  no  Diabetes: no  Cardiovascular Disease: no  Pulmonary Disease: no  Renal Disease: no  GI Disease: no  Sleep Apnea: no  Thromboembolic Problems: no  Clotting Disorder: no  Bleeding Disorder: no  Transfusion Reaction: no  Impaired Immunity: no  Steroid use in the last 6 months: no  Frequent Aspirin use: no    No family history of anesthesia reaction, seizure, aneurysm, sudden death, clotting disorder or bleeding disorder.     After surgery, the patient plans to recover at home with family.    Review of Systems:  Denies fever, chills, visual changes, fatigue, myalgias, nasal congestion, rhinorrhea, ear pain or discharge, sore throat, swollen glands, breast mass, nipple discharge, breast changes, abdominal  "pain, nausea, vomiting, diarrhea, constipation, cough, shortness of breath, chest pain, weight change, change in bowel habits, melena, rectal bleeding, dysuria, frequency, urgency, hematuria, polyuria, polydipsia, polyphagia, joint pain or swelling or erythema, edema, rash, weakness, paresthesias, vaginal discharge or bleeding or mood changes.  Remainder of review of systems was negative.    Positives: feeling well other than hand numbness      Objective:   /84  Pulse 89  Temp 97.9  F (36.6  C)  Resp 14  Ht 5' 4.5\" (1.638 m)  Wt 177 lb (80.3 kg)  LMP 05/04/2018  SpO2 98%  Breastfeeding? No  BMI 29.91 kg/m2  Weight: 177 lb (80.3 kg)      Physical Exam:  General Appearance: Alert, cooperative, no distress, appears stated age  Head: Normocephalic, without obvious abnormality, atraumatic  Eyes: PERRL, conjunctiva/corneas clear, EOM's intact  Ears: Normal TM's and external ear canals, both ears  Nose: Nares normal, septum midline,mucosa normal, no drainage  Throat: Lips, mucosa, and tongue normal; teeth and gums normal  Neck: Supple, symmetrical, trachea midline, no adenopathy;  thyroid: not enlarged, symmetric, no tenderness/mass/nodules  Back: Symmetric, no curvature, ROM normal, no CVA tenderness  Lungs: Clear to auscultation bilaterally, respirations unlabored  Heart: Regular rate and rhythm, S1 and S2 normal, no murmur, rub, or gallop  Abdomen: Soft, non-tender, bowel sounds active all four quadrants,  no masses, no organomegaly  Extremities: Extremities normal, atraumatic, no cyanosis or edema  Skin: Skin color, texture, turgor normal, no rashes or lesions  Lymph nodes: Cervical, supraclavicular nodes normal  Neurologic: Normal         Recent Results (from the past 240 hour(s))   Hemoglobin   Result Value Ref Range    Hemoglobin 12.0 12.0 - 16.0 g/dL   Urinalysis Macroscopic   Result Value Ref Range    Color, UA Yellow Colorless, Yellow, Straw, Light Yellow    Clarity, UA Slightly Cloudy (!) Clear    " Glucose, UA Negative Negative    Bilirubin, UA Negative Negative    Ketones, UA Negative Negative    Specific Gravity, UA 1.025 1.005 - 1.030    Blood, UA Large (!) Negative    pH, UA 6.0 5.0 - 8.0    Protein, UA Negative Negative mg/dL    Urobilinogen, UA 0.2 E.U./dL 0.2 E.U./dL, 1.0 E.U./dL    Nitrite, UA Negative Negative    Leukocytes, UA Negative Negative   Pregnancy (Beta-hCG, Qual), Urine   Result Value Ref Range    Pregnancy Test, Urine Negative Negative    Specific Gravity, UA 1.025 1.001 - 1.030         Alicia Kenney M.D.  [id number]  [place] [address]  Ph: [phone] Fax: [phone]  Pager [phone]

## 2021-06-17 NOTE — TELEPHONE ENCOUNTER
Central PA team  [phone]  Pool: HE PA MED ([id number])          PA has been initiated.       PA form completed and faxed insurance via Cover My Meds     Key:   [id number]     Medication:  Saxenda 18MG/3ML pen-injectors    Insurance:  [employer]        Response will be received via fax and may take up to 5-10 business days depending on plan

## 2021-06-17 NOTE — TELEPHONE ENCOUNTER
Received MTM referral from clinic     Patient was not reachable after several attempts, will route to MTM Pharmacist/Provider as an FYI. Left MTM scheduling information on patients voicemail.    Thank you for the referral,    Howie Amos, MTM coordinator

## 2021-06-18 NOTE — PROGRESS NOTES
CHART NOTE     DATE OF SERVICE:  2018     : 1980   38 y.o.     ASSESSMENT :   Doing well with improvement in symptoms and function.       PLAN:   Loosen restrictions. Refer to PT. RTC prn. Enc to call with any new questions or concerns.     HPI:  Lia Lemon is status post Right CTR on 2018 by Dr. Do Hennessy.      TODAY, Lia Lemon reports no numbness. Good ROM.  Still with intrascapular discomfort.  Mild CTS on L, planning to hold off on L hand for now.      PAST MEDICAL HISTORY, SURGICAL HISTORY, REVIEW OF SYMPTOMS, MEDICATIONS AND ALLERGIES:  Past medical history, surgical history, ROS, medications and allergies reviewed with patient and remain unchanged from previous visit.    Past Medical History:   Diagnosis Date     Abdominal pain      Chronic pain     upper back  pain     CTS (carpal tunnel syndrome)     bilateral     Hyperlipidemia      Normal delivery     x4     Palpitations      Shoulder joint pain     right- past hx.     Vitamin D deficiency        PHYSICAL EXAM:    /80  Pulse 76  Resp 16     A&O. NAD. Spch fluent and approp. PERRL, EOMI, no nystagmus. Face symm. Tongue ML.  CN 2-12 intact. CUEVA W/ good strength. Incision CDI, well healed.     RADIOGRAPHIC IMAGING: NA

## 2021-06-18 NOTE — PROGRESS NOTES
ASSESSMENT AND PLAN:     Problem List Items Addressed This Visit        High    BMI 29.0-29.9,adult (Chronic)     Dx: Initial BMI 42.18 with comorbid weight related conditions including chronic pain, hyperlipidemia, and vitamin D deficiency.  She also appears to have severe binge eating disorder.    Initial Weight: 253.5 lbs bmi 42.18, 3/30/2017  Weight: 172 lb (78 kg) bmi 29.52, 5/23/2018   Weight loss from initial: 81.5  % Weight loss: 32.15 %    Are you experiencing any side effects to the medications:  No  PHENTERMINE 37.5 MG PO Q DAY  3/30/2018 -1/24/2018 - 73 LB DOWN    PHENTERMINE 37.5MG PO Q DAY 5/7 D PER WEEK  1/24/2018 - 5/23/2018 - 8 LBS ADDITIONALLY DOWN (TOTAL 81.5 LB DOWN)  Starting to get difficult now, so will decrease dose 5/23/2018     PHENTERMINE 37.5 MG PO 3/7 D PER WEEK  5/23/2018 - Started.    vyvanse - discussed today 5/23/2018 - indication-  severe binge eating disorder.   She will research     Hunger control:  Good although she does eat larger quantities about once a week. She tells me today that she has had problems with what seems to be binge eating about once a day, and the phentermine has helped, she is worried that she will go back to that after getting off the phentermine.   Exercise was discussed: hit or miss. Kids sports taking over.  Taking supplements:  Taking fish oil, , mvi, vit d, she is not taking chromium.   Discussed journaling food:  no  Patient is pleased with the current results:  Yes.   The patient is following the nutrition plan:  Yes.   Barriers to losing weight:  Binge eating disorder - says she used to binge daily before phentermine.       PLAN  phentermine 3/7 d per week. follow up 2 months. consider vyvanse for severe binge eating disorder.            Binge-eating disorder, severe (Chronic)     Currently weaning off phentermine.  We discussed Vyvanse.  She will go to the website for Vyvanse and consider this drug for herself.  We can talk about it at follow-up which  is planned for 2 months from now.                Chief Complaint   Patient presents with     Weight Loss        HPI  Lia Lemon is a 38 y.o. female she expresses some difficulty keeping her weight down at this point.  She is had such good success till now with the phentermine but now it is starting to get harder.  She says in the past she is always regained the weight.  She says that she does sometimes uncontrollably eat large quantities of food.  This seemed to improve once she was on the phentermine but she is worried that it will come back once she is off the phentermine.  She tells me that this happened about once a day in the past.    History   Smoking Status     Never Smoker   Smokeless Tobacco     Never Used      Current Outpatient Prescriptions on File Prior to Visit   Medication Sig Dispense Refill     cholecalciferol, vitamin D3, (VITAMIN D3) 2,000 unit Tab Take 2000 IU daily  0     chromium 200 mcg Tab Take 400mg once daily or 200 mg twice daily (at snack times) 30 each 0     HYDROcodone-acetaminophen (NORCO) 5-325 mg per tablet Take 1 tablet by mouth every 4 (four) hours as needed for pain. 20 tablet 0     OMEGA-3/DHA/EPA/FISH OIL (FISH OIL-OMEGA-3 FATTY ACIDS) 300-1,000 mg capsule Take 2 capsules (2 g total) by mouth daily.  0     phentermine (ADIPEX-P) 37.5 mg tablet 1/2 tab po bid 40 tablet 0     valACYclovir (VALTREX) 1000 MG tablet Take 1 tablet (1,000 mg total) by mouth daily. (Patient taking differently: Take 1,000 mg by mouth as needed. ) 30 tablet 0     No current facility-administered medications on file prior to visit.       Allergies   Allergen Reactions     Morphine Hives and Itching     Can take vicodin and percocet         Review of Systems   Constitutional: Negative.    HENT: Negative.    Eyes: Negative.    Respiratory: Negative.    Cardiovascular: Negative.    Gastrointestinal: Negative.    Endocrine: Negative.    Genitourinary: Negative.    Musculoskeletal: Negative.    Skin:  "Negative.    Neurological: Negative.    Hematological: Negative.    Psychiatric/Behavioral: Negative.         OBJECTIVE: /80  Pulse 88  Resp 12  Ht 5' 4\" (1.626 m)  Wt 172 lb (78 kg)  LMP 05/04/2018 (Exact Date)  Breastfeeding? No  BMI 29.52 kg/m2   Physical Exam   Constitutional: She is oriented to person, place, and time. She appears well-developed and well-nourished. No distress.   HENT:   Head: Normocephalic and atraumatic.   Eyes: Conjunctivae are normal.   Neck: Neck supple.   Cardiovascular: Normal rate and regular rhythm.    Pulmonary/Chest: Effort normal.   Musculoskeletal: Normal range of motion.   Neurological: She is alert and oriented to person, place, and time.   Skin: Skin is warm and dry.   Psychiatric: She has a normal mood and affect.        This note was created using Dragon dictation.  Please excuse any grammatical errors.    "

## 2021-06-18 NOTE — LETTER
Letter by Do Hennessy MD at      Author: Do Hennessy MD Service: -- Author Type: --    Filed:  Encounter Date: 1/11/2019 Status: (Other)       Alicia Kenney MD  [address]  [facility] [address]  [facility] [address]                                  January 11, 2019    Patient: Lia Lemon   MR Number: [id number]   YOB: 1980   Date of Visit: 1/11/2019     Dear Dr. Pablito MD:    Thank you for referring Lia Lemon to me for evaluation. Below are the relevant portions of my assessment and plan of care.    If you have questions, please do not hesitate to call me. I look forward to following Lia along with you.    Sincerely,        Do Hennessy MD          CC  No Recipients  Do Hennessy MD  1/11/2019  4:46 PM  Sign at close encounter  Lia Lemon is status post Right CTR on 5/14/2018.     Last seen on 6/12/2018 and was referred to PT.   She did not do the physical therapy.    Also has a history of Chiari I malformation.    Today she returns in follow up for discussion of her recurrent symptoms. She presents with a chief complaint of progressive occipital HA and posterior neck pain associated with pain, numbness and tingling in her both hands. Gait and balance are normal.    She saw a neurosurgeon at [facility] that recommended observation.  This was in July 2018.  I have reviewed this note.    She states that her symptoms have been progressively worse.  She does have a cine flow study from 1 year ago that shows some dampening to flow in the posterior fossa at the level of the foramen magnum.    She has no specific motor or sensory findings.  She has symptoms that include headache that is apparently related to Valsalva maneuver, cough and sneeze.  She is concerned that her Chiari may be symptomatic.    Since her imaging is over 1 year old, I have recommended a new cine flow study.  I have also asked that she have a repeat EMG of both upper extremities.  There may be a strand of  ligament that I missed because she still has symptoms of carpal tunnel syndrome.  She will return after the studies are completed.  Total time spent in this evaluation of information, review of pertinent consultations, personal interpretation of imaging and face-to-face discussion was 25 minutes.

## 2021-06-18 NOTE — ANESTHESIA POSTPROCEDURE EVALUATION
Patient: Lia Lemon  RIGHT CARPAL TUNNEL  Anesthesia type: MAC    Patient location: Phase II Recovery  Last vitals:   Vitals:    05/14/18 0953   BP:    Pulse:    Resp:    Temp: 36.2  C (97.2  F)   SpO2:      Post vital signs: stable  Level of consciousness: awake and responds to simple questions  Post-anesthesia pain: pain controlled  Post-anesthesia nausea and vomiting: no  Pulmonary: unassisted, return to baseline  Cardiovascular: stable and blood pressure at baseline  Hydration: adequate  Anesthetic events: no    QCDR Measures:  ASA# 11 - Sultana-op Cardiac Arrest: ASA11B - Patient did NOT experience unanticipated cardiac arrest  ASA# 12 - Sultana-op Mortality Rate: ASA12B - Patient did NOT die  ASA# 13 - PACU Re-Intubation Rate: NA - No ETT / LMA used for case  ASA# 10 - Composite Anes Safety: ASA10A - No serious adverse event    Additional Notes:

## 2021-06-18 NOTE — PROGRESS NOTES
Lia Lemon is status pos Right CTR on 5/14/2018 by Dr. Do Hennessy.  Preoperatively presented with CTS symptoms.  Today she returns in follow up for sutures removal. She is very pleased with her outcome - her incisional discomfort is minimal. Denies sensory or motor issues. Takes Advil prn.    Surgical wound WNL - CDI, no signs of infection or skin breakdown.  Incision well-healed: good skin approximation, no redness or visible/palpable edema, no tenderness to palpation.  PT. AF, denies fever, chills or sweats.  Pt. reports that the symptoms are improved from pre-op.      Suture removal - sutures intact removed without difficulty. Wound prepped with Betadine before and after removal.  Surrounding skin has no signs of breakdown.  Verbal instructions regarding incision care are given.  Pt. advised to call us if any s/s of infection noted - all discussed in details.

## 2021-06-18 NOTE — ANESTHESIA PREPROCEDURE EVALUATION
Anesthesia Evaluation      Patient summary reviewed   No history of anesthetic complications     Airway   Mallampati: II  Neck ROM: full   Pulmonary - negative ROS and normal exam                          Cardiovascular - negative ROS and normal exam  Exercise tolerance: > or = 4 METS  (+) , hypercholesterolemia,      Neuro/Psych    (+) chronic pain    Endo/Other    (+) obesity,      GI/Hepatic/Renal - negative ROS           Dental - normal exam                        Anesthesia Plan  Planned anesthetic: MAC  Ketamine and propofol for sedation.  ASA 2     Anesthetic plan and risks discussed with: patient  Anesthesia plan special considerations: antiemetics,   Post-op plan: routine recovery

## 2021-06-18 NOTE — ANESTHESIA CARE TRANSFER NOTE
Last vitals:   Vitals:    05/14/18 0943   BP: 154/74   Pulse: 80   Resp: 16   Temp:    SpO2: 99%     Patient's level of consciousness is drowsy  Spontaneous respirations: yes  Maintains airway independently: yes  Dentition unchanged: yes  Oropharynx: oropharynx clear of all foreign objects    QCDR Measures:  ASA# 20 - Surgical No Data Recorded  PQRS# 430 - Adult PONV Prevention: 4558F - Pt received => 2 anti-emetic agents (different classes) preop & intraop  ASA# 8 - Peds PONV Prevention: NA - Not pediatric patient, not GA or 2 or more risk factors NOT present  PQRS# 424 - Sultana-op Temp Management: 4559F - At least one body temp DOCUMENTED => 35.5C or 95.9F within required timeframe  PQRS# 426 - PACU Transfer Protocol: - Transfer of care checklist used  ASA# 14 - Acute Post-op Pain: ASA14B - Patient did NOT experience pain >= 7 out of 10

## 2021-06-19 NOTE — PROGRESS NOTES
ASSESSMENT AND PLAN:     Problem List Items Addressed This Visit        High    BMI 29.0-29.9,adult - Primary (Chronic)     Dx: Initial BMI 42.18 with comorbid weight related conditions including chronic pain, hyperlipidemia, and vitamin D deficiency.  She also appears to have severe binge eating disorder.     Initial Weight: 253.5 lbs bmi 42.18, 3/30/2017  Weight: 172 lb (78 kg) bmi 29.52, 5/23/2018   Weight: 178 lb (80.7 kg) bmi 30.55 7/24/2018   Weight loss from initial: 75.5  % Weight loss: 29.78 %      Are you experiencing any side effects to the medications:  No  PHENTERMINE 37.5 MG PO Q DAY  3/30/2018 -1/24/2018 - 73 LB DOWN     PHENTERMINE 37.5MG PO Q DAY 5/7 D PER WEEK  1/24/2018 - 5/23/2018 - 8 LBS ADDITIONALLY DOWN (TOTAL 81.5 LB DOWN)  Starting to get difficult now, so will decrease dose 5/23/2018      PHENTERMINE 37.5 MG PO 3/7 D PER WEEK  5/23/2018 - 7/24/2018 - gained 6 lbs.   Dc phentermine 7/24/2018      CONTRAVE 2 PILLS two times a day (START SLOWLY)  7/24/2018 - START     vyvanse - discussed today 5/23/2018 - indication-  severe binge eating disorder.   She will research      Hunger control:  Good although she does eat larger quantities about once a week. She tells me today that she has had problems with what seems to be binge eating about once a day, and the phentermine has helped, she is worried that she will go back to that after getting off the phentermine.   Exercise was discussed: hit or miss. Kids sports taking over.  Taking supplements:  Taking fish oil, , mvi, vit d, she is not taking chromium.   Discussed journaling food:  no  Patient is pleased with the current results:  Yes. But scared to regain weight.   The patient is following the nutrition plan:  Yes.   Barriers to losing weight:  Binge eating disorder - says she used to binge daily before phentermine.         PLAN  Dc phentermine  Consider vyvanse in the future for binge eating disorder. I did not want to start vyvanse today  "because I worry that it will not be as effective as she is just coming off phentermine (another stimulant to which she appears to have developed some tolerance.  Start contrave. We did discuss risks and benefits.     Follow up in 1-3 months (she is told that we would like to see weight loss of 5% by 3 months - down to 170 lbs)         Relevant Medications    naltrexone-bupropion (CONTRAVE) 8-90 mg TbER           Chief Complaint   Patient presents with     Weight Loss     last weight 172lbs        HPI  Lia Lemon is a 38 y.o. female comes in for follow up. Worried about weight regain.     History   Smoking Status     Never Smoker   Smokeless Tobacco     Never Used      Current Outpatient Prescriptions on File Prior to Visit   Medication Sig Dispense Refill     cholecalciferol, vitamin D3, (VITAMIN D3) 2,000 unit Tab Take 2000 IU daily  0     OMEGA-3/DHA/EPA/FISH OIL (FISH OIL-OMEGA-3 FATTY ACIDS) 300-1,000 mg capsule Take 2 capsules (2 g total) by mouth daily.  0     [DISCONTINUED] phentermine (ADIPEX-P) 37.5 mg tablet 1/2 tab po bid (Patient taking differently: 3 (three) times a week. 1/2 tab po bid) 40 tablet 0     No current facility-administered medications on file prior to visit.       Allergies   Allergen Reactions     Morphine Hives and Itching     Can take vicodin and percocet       Review of Systems   Constitutional: Negative.    HENT: Negative.    Eyes: Negative.    Respiratory: Negative.    Cardiovascular: Negative.    Gastrointestinal: Negative.    Endocrine: Negative.    Genitourinary: Negative.    Musculoskeletal: Negative.    Skin: Negative.    Neurological: Negative.    Hematological: Negative.    Psychiatric/Behavioral: Negative.         OBJECTIVE: /74 (Patient Site: Left Arm, Patient Position: Sitting, Cuff Size: Adult Regular)  Pulse 86  Ht 5' 4\" (1.626 m)  Wt 178 lb (80.7 kg)  LMP 07/21/2018  Breastfeeding? No  BMI 30.55 kg/m2   Physical Exam   Constitutional: She is oriented to " person, place, and time. She appears well-developed and well-nourished. No distress.   HENT:   Head: Normocephalic and atraumatic.   Eyes: Conjunctivae are normal.   Neck: Neck supple.   Cardiovascular: Normal rate and regular rhythm.    Pulmonary/Chest: Effort normal.   Musculoskeletal: Normal range of motion.   Neurological: She is alert and oriented to person, place, and time.   Skin: Skin is warm and dry.   Psychiatric: She has a normal mood and affect.        This note was created using Dragon dictation.  Please excuse any grammatical errors.

## 2021-06-19 NOTE — PROGRESS NOTES
"ACUPUNCTURIST TREATMENT NOTE    Name: Lia Lemon  :  1980  MRN:  [id number]      Acupuncture Treatment  Patient Type: JN Pain  Intervention Reason: Pain  Pre-session Pain Ratin  Post-session Pain Ratin  Patient complaint:: Patient would like to work on pain mostly in right side of neck/upper back/arm. Several years with pain/tingling issues and much worse past few months. Carpal tunnel surgery 6 weeks ago. \"pressure\" or tension headaches occipital common 5 days per week. Wakes several times at night with numbness, difficult to fall back asleep. Menstrual cycle very heavy and many clots with bloating and PMS symptoms such as breast tenderness, irritability, etc.   Acupuncture (Points):: Liv3, Gb41, Ub62, Ki3, Sp6, Gb34, Si3, (L) Pc6, (L) Ht7, Gb20, Ub10, HTJJ C2-C7, Gb21, Si10, Baihui, Sishencong  TCM Pulse: thready  TCM Tongue: pale lavender, thicker white coat, redder tip  TCM Diagnosis: liver qi and blood stasis, spleen qi lassiter, blood lassiter  Practitioner Observed: 30 minute treatment. Heat lamp over upper back. Guasha/tuina with kwanloong to neck/shoulders.  Checklist: Progress Note Completed;Consent Reveiwed    \"Risks and benefits of acupuncture were discussed with patient. Consent for treatment was given. We thank you for the referral.\"     Vandana Hawkins L.Ac.    Date:  2018  Time:  3:22 PM    "

## 2021-06-19 NOTE — PROGRESS NOTES
"ACUPUNCTURIST TREATMENT NOTE    Name: Lia Lemon  :  1980  MRN:  [id number]      Acupuncture Treatment  Patient Type: JN Pain  Intervention Reason: Pain  Pre-session Pain Ratin  Post-session Pain Ratin  Patient complaint:: Patient reports pain/tightness in right side of neck/upper back/arm /10. Denies headache at time of treatment, states they have been feeling \"a little better.\"  Acupuncture (Points):: Liv3, Gb41, (L)Ub62, Ki3, (R) Sp4, Sp6, Gb34, (R)Si3, (L) Pc6, (L) Ht7, Gb20, Ub10, Gb12, (R)HTJJ C2-C7, Gb21, Si10, (R) Gb8, (R) Si13x2, Baihui, Sishencong  TCM Diagnosis: liver qi and blood stasis, spleen qi lassiter, blood lassiter  Practitioner Observed: 30 minute treatment. Heat lamp over upper back. Cupping to upper back/shoulder  Checklist: Progress Note Completed;Consent Reveiwed    \"Risks and benefits of acupuncture were discussed with patient. Consent for treatment was given. We thank you for the referral.\"     Vandana Hawkins L.Ac.    Date:  7/10/2018  Time:  2:46 PM    "

## 2021-06-19 NOTE — PROGRESS NOTES
PROGRESS NOTE   7/18/2018    SUBJECTIVE:  Lia Lemon is a 38 y.o. female  who presents for   Chief Complaint   Patient presents with     Follow-up     had carpel tunnel surgery and still having pain     Patient comes in today for discussion regarding her current constellation of symptoms and what may be the next best step in her plan of care.  She notes that at least since 2015 and maybe even before then she noticed that she was having bilateral hand numbness.  She also noticed some pain in her upper back kind of near the scapular area.  The right hand seem to be much worse than the left in terms of numbness at that point.  She saw to different doctors at [facility] and they did an MRI as well as bilateral EMG.  They told her that the MRI looked fine and the EMG showed mild to moderate carpal tunnel syndrome.  They did not recommend any kind of surgery.  They offered some physical therapy but patient declined at that time.  She monitored her symptoms because they do not seem like they were all that bad but last fall she started getting much worse symptoms.  She noted that she had a lot of upper back pain kind of intrascapular relief.  She went to the chiropractor for a few adjustments of her neck and her back and ever since then she has had a constant numbness sensation or a funny sensation in her throat in her mouth.  Something definitely changed after she had the adjustment with the chiropractor last fall and she is continued to have these issues.  She notes that essentially the symptoms have been unchanged since last fall.  In December 2017 she all of a sudden got severe left elbow pain.  She was seen at [facility] again and they thought it was a progression of her carpal tunnel syndrome.  They reviewed the EMG that she had done in 2015 and they noted that in 2015 her right arm actually had evidence of severe carpal tunnel issues.  When she realized that the EMG from 2015 actually said that  she had severe carpal tunnel issues rather than the mild to moderate that they have told her in 2015 she left [facility] and she went to [facility] for a second opinion.  [facility] told her that the MRI that she had done in 2015 show that she had a Chiari malformation.  The spine Center did a complete evaluation and felt that the majority of her issues were due to the carpal tunnel issues and therefore referred her to Dr. Hennessy.  They did a repeat EMG which showed that she had super mild carpal tunnel symptoms however they decided to proceed with carpal tunnel surgery to see if that would help with some of the issues that she has been having.  They also at the spine Center confirm that she does have a Chiari malformation but not a complete block.  Patient notes that she spent a long time discussing the Chiari malformation with Dr. Hennessy and Dr. Hennessy was quite impressed by the fact that she did not have a gag reflex.  Patient is fairly certain that when she got adjusted by the chiropractor last fall it probably aggravated the Chiari malformation that she had in 2015 that no one had told her about.  When she saw Dr. Hennessy it was thought that the easier option was to do surgery on her wrists and see if that will help her symptoms.  She did have the carpal tunnel surgery but continues to have all of her symptoms.  She also feels a bit foggy in her thinking process and she is having some issues with dexterity as well as having more heart ectopy and hand numbness.  Dr. Hennessy apparently told her that she would eventually need the Chiari fixed surgically but did not feel like that was something that needed to be done right now.  She continues to have head and neck and upper back pain as well as hand numbness and PACs.  She notes that the intensity of all of her symptoms seem to be getting progressively worse.  She still continues to get a strange sensation in her throat as well.  She feels a  little bit off balance and she feels like she is in a fog all the time.  She also feels like her hearing is funky and it is hard to hear if there are other things going on around her.  She is quite concerned about the symptoms and is wondering what the next step is.  She is wondering about being referred to a completely new set of doctors as another opinion.     Patient Active Problem List   Diagnosis     Palpitations     Abdominal Pain     Joint pain localized in the shoulder      Vaginal delivery     BMI 29.0-29.9,adult     Chronic pain     Vitamin D deficiency disease     Hyperlipidemia     Carpal tunnel syndrome of right wrist     Binge-eating disorder, severe       Current Outpatient Prescriptions   Medication Sig Dispense Refill     cholecalciferol, vitamin D3, (VITAMIN D3) 2,000 unit Tab Take 2000 IU daily  0     OMEGA-3/DHA/EPA/FISH OIL (FISH OIL-OMEGA-3 FATTY ACIDS) 300-1,000 mg capsule Take 2 capsules (2 g total) by mouth daily.  0     phentermine (ADIPEX-P) 37.5 mg tablet 1/2 tab po bid (Patient taking differently: 3 (three) times a week. 1/2 tab po bid) 40 tablet 0     No current facility-administered medications for this visit.        Allergies   Allergen Reactions     Morphine Hives and Itching     Can take vicodin and percocet       Past Medical History:   Diagnosis Date     Abdominal pain      Chronic pain     upper back  pain     CTS (carpal tunnel syndrome)     bilateral     Hyperlipidemia      Normal delivery     x4     Palpitations      Shoulder joint pain     right- past hx.     Vitamin D deficiency        Past Surgical History:   Procedure Laterality Date     ADRENALECTOMY Right     CORTISOL-SECRETING ADENOMA, S/P HEMORRHAGE.     CARPAL TUNNEL RELEASE Right 2018    Dr Hennessy      SECTION  2007    first child     DILATION AND CURETTAGE OF UTERUS  2006     IN REVISE MEDIAN N/CARPAL TUNNEL SURG Right 2018    Procedure: RIGHT CARPAL TUNNEL;  Surgeon: Do Hennessy MD;  Location:  [facility] Main OR;  Service: Neurosurgery       History   Smoking Status     Never Smoker   Smokeless Tobacco     Never Used       OBJECTIVE:     /82 (Patient Site: Left Arm, Patient Position: Sitting, Cuff Size: Adult Regular)  Pulse 94  Wt 168 lb 7 oz (76.4 kg)  SpO2 99%  Breastfeeding? No  BMI 28.91 kg/m2    Physical Exam:  GENERAL APPEARANCE: A&A, NAD, well hydrated, well nourished  SKIN:  Normal skin turgor, no lesions/rashes   EXTREMITY: no swelling noted.  Full range of motion of all 4 extremities.   NEURO: no gross deficits   PSYCHIATRIC;  Mood appropriate, memory intact            ASSESSMENT/PLAN:     History of Chiari malformation [Z86.69]      1. History of Chiari malformation  - Ambulatory referral to Neurosurgery    2. Hand numbness  - Ambulatory referral to Neurosurgery    3. Heart palpitations  - Ambulatory referral to Neurosurgery    Patient overall seems to be doing okay.  She can function and do what she needs to do.  She is able take care of her kids and none of the symptoms that she is having are completely overwhelming or disabling.  She is able to function and go to work carry on and do what she needs to do on a daily basis however she is frustrated by the fact that she is continuing to have all the symptoms and is wondering if perhaps the Chiari malformation is causing a lot of these issues and whether it would be appropriate to have that repaired at this point.  Certainly she is gotten a lot of conflicting information over the last few years about her carpal tunnel as well as about her Chiari malformation.  The carpal tunnel surgery that she recently had really did not seem like it made much difference for her and I think she would benefit from talking with someone regarding the whole constellation of symptoms that she is having and how the Chiari malformation may be contributing to these issues.  I am going to place referral to the neurosurgeons and I am hoping that we can get  her in with the [facility] for neurology as well as neurosurgery.  I did place the order for neurosurgery as I think that is the most expeditious way to get her in to see somebody who is going to be the most knowledgeable about the constellation of symptoms she is having and where these may be coming from.  At this point she overall feels like things are doing okay and does not need anything emergently however would like to get a second opinion as to what may be going on and what may be the treatment of choice for her constellation of symptoms.  If these symptoms seem to get worse or she has any changes in the symptoms she certainly should let me know.  Referral was placed for neurosurgery and Milli from our office should help her get that appointment scheduled.  She has additional questions or concerns will certainly let me know. Total time spent with patient was at least 25 minutes,  of which greater than fifty percent was spent in counselling and coordination of care of the above medical problems.  Alicia Kenney MD

## 2021-06-19 NOTE — PROGRESS NOTES
"ACUPUNCTURIST TREATMENT NOTE    Name: Lia Lemon  :  1980  MRN:  [id number]      Acupuncture Treatment  Patient Type: JN Pain  Intervention Reason: Pain  Pre-session Pain Rating: 3  Post-session Pain Ratin  Patient complaint:: Patient reports feeling more sore over weekend- played some yard games and feels she may have triggered more soreness and weakness. Sore in right hand at surgical site and weakness in both hands.  Acupuncture (Points):: Liv3, Gb41, Ub62, Ki3, Sp6, Gb34, Si3, Pc6, Ht7, (R) Pc7,  Lu7, Li4, Gb20, Ub10, HTJJ C2-C7, Gb21, Si9, Si10, Si11, Gb12, Gb8, Baihui,   Magaly: XNKQ bilateral Ht1  TCM Diagnosis: liver qi adn blood stasis, spleen qi lassiter, blood lassiter  Practitioner Observed: 30 minute treatment. Heat lamp over upper back. Posumon applied to upper back/neck.  Checklist: Progress Note Completed;Consent Reveiwed    \"Risks and benefits of acupuncture were discussed with patient. Consent for treatment was given. We thank you for the referral.\"     Vandana Hawkins L.Ac.    Date:  2018  Time:  10:39 AM    "

## 2021-06-19 NOTE — PROGRESS NOTES
"ACUPUNCTURIST TREATMENT NOTE    Name: Lia Lemon  :  1980  MRN:  [id number]      Acupuncture Treatment  Patient Type: JN Pain  Intervention Reason: Pain  Pre-session Pain Rating: 3  Post-session Pain Ratin  Patient complaint:: Patient reports today neck/upper back pain 3/10, and more pain in right hand lately near surgical site. Feels weak in right hand.  Acupuncture (Points):: Liv3, Gb41, Ub62, Ki3, Sp4, Sp6, Gb34, Si3, Pc6, (R) Ht7, Lu7, Li4, Gb20, Ub10, HTJJ C2-C6, Gb21, Si9, Si10, Si11, Gb12, Gb8,  Baihui, Sishencong  TCM Diagnosis: liver qi and blood stasis, spleen qi lassiter, blood lassiter  Practitioner Observed: 30 minute treatment. Heat lamp over upper back. Guasha and tuina with posumon to neck, shoulders and right hand.  Checklist: Progress Note Completed;Consent Reveiwed    \"Risks and benefits of acupuncture were discussed with patient. Consent for treatment was given. We thank you for the referral.\"     Vandana Hawkins L.Ac.    Date:  2018  Time:  11:36 AM    "

## 2021-06-19 NOTE — PROGRESS NOTES
"ACUPUNCTURIST TREATMENT NOTE    Name: Lia Lemon  :  1980  MRN:  [id number]      Acupuncture Treatment  Patient Type: JN Pain  Intervention Reason: Pain  Pre-session Pain Ratin  Post-session Pain Ratin  Patient complaint:: Patient reports over all feeling better- less painful. Still feeling some weakness and lack of coordination in hands  Acupuncture (Points):: Liv3, Gb41, Ub62, Ki3, Sp6, Gb39, Gb34, Si3, Pc6, Ht7, Lu7, Li4, Gb20, Ub10, HTJJ C2-C7, Gb21, Si9, Si10, Gb12, Gb8, Baihui, (R) Si13x2  Magaly: XNKQ bilateral Ht1  TCM Diagnosis: liver qi and blood stasis, spleem qi lassiter, blood lassiter  Practitioner Observed: 30 minute treatment. Heat lamp over upper back. Tuina with kwanloong to neck/upper back/shoulders.  Checklist: Progress Note Completed;Consent Reveiwed    \"Risks and benefits of acupuncture were discussed with patient. Consent for treatment was given. We thank you for the referral.\"     Vandana Hawkins L.Ac.  Date:  2018  Time:  1:01 PM    "

## 2021-06-19 NOTE — PROGRESS NOTES
"ACUPUNCTURIST TREATMENT NOTE    Name: Lia Lemon  :  1980  MRN:  [id number]      Acupuncture Treatment  Patient Type: JN Pain  Intervention Reason: Pain  Pre-session Pain Rating: 3  Post-session Pain Ratin  Patient complaint:: Patient reports after last treatment symptoms subsided for a few hours, but then returned. Particularly bad day on  with tingling in arms/hands, headache, but yesterday and today are better. Pain today 3/10, denies headache.  Acupuncture (Points):: Liv3, Gb41, Ub62, Ki3, Sp4, Sp6, Gb34, Si3, Pc6, Gb20, Ub10, Anmian, HTJJ C6-C7, Gb21, Si10, Gb8, Baihui, Sishencong  TCM Diagnosis: liver qi and blood stasis, spleen qi lassiter, blood lassiter  Practitioner Observed: 30 minute treatment. Heat lamp over upper back. Guasha and tuina to neck/shoulders. Prone and supine tuina (pec minor).  Checklist: Progress Note Completed;Consent Reveiwed    \"Risks and benefits of acupuncture were discussed with patient. Consent for treatment was given. We thank you for the referral.\"     Vandana Hawkins L.Ac.    Date:  2018  Time:  10:44 AM    "

## 2021-06-22 NOTE — PROGRESS NOTES
ASSESSMENT AND PLAN:      Problem List Items Addressed This Visit        High    Binge-eating disorder, severe - Primary (Chronic)     Vyvanse 30 mg orally per day started today-see BMI in the problem list for more detail.         Relevant Medications    lisdexamfetamine (VYVANSE) 30 MG capsule    Class 2 obesity with alveolar hypoventilation, serious comorbidity, and body mass index (BMI) of 35.0 to 35.9 in adult (H)     Dx: Initial BMI 42.18 with comorbid weight related conditions including chronic pain, hyperlipidemia, and vitamin D deficiency.  She also appears to have severe binge eating disorder.     Initial Weight: 253.5 lbs bmi 42.18, 3/30/2017  Weight: 172 lb (78 kg) bmi 29.52, 5/23/2018   Weight: 178 lb (80.7 kg) bmi 30.55 7/24/2018   Weight: 204 lb (92.5 kg) Body mass index is 35.02 kg/m . 12/13/2018   Weight loss from initial: 49.5  % Weight loss: 19.53 %    PHENTERMINE 37.5 MG PO Q DAY  3/30/2018 -1/24/2018 - 73 LB DOWN     PHENTERMINE 37.5MG PO Q DAY 5/7 D PER WEEK  1/24/2018 - 5/23/2018 - 8 LBS ADDITIONALLY DOWN (TOTAL 81.5 LB DOWN)  Starting to get difficult now, so will decrease dose 5/23/2018      PHENTERMINE 37.5 MG PO 3/7 D PER WEEK  5/23/2018 - 7/24/2018 - gained 6 lbs.   Dc phentermine 7/24/2018       CONTRAVE 2 PILLS two times a day (START SLOWLY)  7/24/2018 - - SELF DISCONTINUED.       Vyvanse 30 MG orally per morning-  indication-  severe binge eating disorder.   12/13/2018 - START       PLAN  REGAINING WEIGHT - (BMI down from 42-35 even with weight regain.)    CAP PLAN - need to discuss in more detail at follow up.   Less than or equal to 206 lbs- control  Between 207 and 209 lbs - action  Greater than or equal to 210 - panic     Start Vyvanse 30 mg orally per day.  Notes she is no longer taking Contrave-she discontinued that on her own.    Follow up in 1 MONTH                Chief Complaint   Patient presents with     Weight Loss        HPI  iLa Lemon is a 38 y.o. female is here for  "follow up of weight loss.     Are you experiencing any side effects to the medications:  No -she self discontinued Contrave on her own.  She did not find it helpful.  Hunger control: She is binging daily.  Exercise was discussed: We spent the entire visit discussing Vyvanse.  Taking supplements: This was not discussed today but in the past she was taking fish oil, , mvi, vit d, she is not taking chromium.    Discussed journaling food:  no   Patient is pleased with the current results: She is a bit unhappy that she regained weight.  The patient is following the nutrition plan: No, binging  Barriers to losing weight:  Binge eating disorder -daily    Social History     Tobacco Use   Smoking Status Never Smoker   Smokeless Tobacco Never Used      Current Outpatient Medications on File Prior to Visit   Medication Sig Dispense Refill     cholecalciferol, vitamin D3, (VITAMIN D3) 2,000 unit Tab Take 2000 IU daily  0     OMEGA-3/DHA/EPA/FISH OIL (FISH OIL-OMEGA-3 FATTY ACIDS) 300-1,000 mg capsule Take 2 capsules (2 g total) by mouth daily.  0     [DISCONTINUED] naltrexone-bupropion (CONTRAVE) 8-90 mg TbER Take 2 tablets by mouth 2 (two) times a day. 120 tablet 3     No current facility-administered medications on file prior to visit.       Allergies   Allergen Reactions     Morphine Hives and Itching     Can take vicodin and percocet       Review of Systems   Constitutional: Negative.    HENT: Negative.    Eyes: Negative.    Respiratory: Negative.    Cardiovascular: Negative.    Gastrointestinal: Negative.    Endocrine: Negative.    Genitourinary: Negative.    Musculoskeletal: Negative.    Skin: Negative.    Neurological: Negative.    Hematological: Negative.    Psychiatric/Behavioral: Negative.         OBJECTIVE: /86 (Patient Site: Left Arm, Patient Position: Sitting, Cuff Size: Adult Regular)   Pulse 84   Resp 16   Ht 5' 4\" (1.626 m)   Wt 204 lb (92.5 kg)   BMI 35.02 kg/m     Physical Exam   Constitutional: She " is oriented to person, place, and time. She appears well-developed and well-nourished. No distress.   HENT:   Head: Normocephalic and atraumatic.   Eyes: Conjunctivae are normal.   Neck: Neck supple.   Cardiovascular: Normal rate and regular rhythm.   Pulmonary/Chest: Effort normal.   Musculoskeletal: Normal range of motion.   Neurological: She is alert and oriented to person, place, and time.   Skin: Skin is warm and dry.   Psychiatric: She has a normal mood and affect.        This note was created using Dragon dictation.  Please excuse any grammatical errors.

## 2021-06-22 NOTE — PROGRESS NOTES
"ACUPUNCTURIST TREATMENT NOTE    Name: Lia Lemon  :  1980  MRN:  [id number]      Acupuncture Treatment  Patient Type: JN Pain  Intervention Reason: Pain  Pre-session Pain Ratin  Post-session Pain Ratin  Patient complaint:: Patient reports over past few months pain has started to increase. She states when she was having acupuncture more regularly she felt her pain was being managed better. Today rates pain 7/10 in neck, upper back, head. Hands are often weak and tingling.  Acupuncture (Points):: Liv3, Gb41, Ub62, Ki3, Gb39, Sp6, Gb34, Li4, Si3, Ht7, Pc6, Baihui, [place]ishen[place], Gb8, Gb12, Gb20, Ub10, Gb21, Si9, Si10, HTJJ C7  TCM Diagnosis: liver qi and blood stasis, blood lassiter  Practitioner Observed: 30 minute treatment. Heat lamp over upper back. Tuina with posumon to neck/upper back.  Checklist: Progress Note Completed;Consent Reveiwed    \"Risks and benefits of acupuncture were discussed with patient. Consent for treatment was given. We thank you for the referral.\"     Vandana Hawkins L.Ac.    Date:  2019  Time:  12:01 PM    "

## 2021-06-23 NOTE — TELEPHONE ENCOUNTER
Prior Authorization Request  Who s requesting:  Pharmacy  Pharmacy Name and Location: [facility]   Medication Name: lisdexamfetamine (VYVANSE) 30 MG capsule for BID  Insurance Plan: Preferred one   Insurance Member ID Number:  [id number]  Informed patient that prior authorizations can take up to 10 business days for response:   No  Okay to leave a detailed message: Yes

## 2021-06-23 NOTE — PROGRESS NOTES
1. Pain of right scapula  predniSONE (DELTASONE) 10 mg tablet   2. Elevated blood pressure reading without diagnosis of hypertension           ASSESSMENT/PLAN:     The following high BMI interventions were performed this visit: encouragement to exercise, weight monitoring, exercise promotion: strength training and exercise promotion: stretching    1. Pain of right scapula    - predniSONE (DELTASONE) 10 mg tablet; 40 mg daily x3 days, 30 mg daily x3 days, 20 mg daily x3 days, 10 mg daily x3 days.  Dispense: 30 tablet; Refill: 0    2. Elevated blood pressure reading without diagnosis of hypertension    -continue to monitor, patient is asymptomatic today. 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 Follow up if no improvement is seen in the next one to two weeks.       There are no Patient Instructions on file for this visit.  Medications Discontinued During This Encounter   Medication Reason     cholecalciferol, vitamin D3, 400 unit/mL Drop drops      lisdexamfetamine (VYVANSE) 30 MG capsule Duplicate order     Return if symptoms worsen or fail to improve, for right scapular pain, hypertension.         Sanjana Botello NP          SUBJECTIVE:  Lia Lemon is a 38 y.o. female who presents to the clinic today with pain patient has a significant history of having a right intrascapular pain that is constant.  She was evaluated by Dr. Do Hennessy from neurology for her ongoing issues with her cervical spine, myofascial pain carpal tunnel syndrome.  She did undergo an MRI of the brain and cervical spine which showed no evidence for infarct, hemorrhage, mass or hydrocephalus.  CSF flow study across the huerta magnum showed that it was well preserved.  Lateral cervical alignment showed straightening of normal cervical lordosis, no new subluxation was seen, she had normal vertebral body height and marrow signal, normal cervical cord, mild caudal position, cerebellar tonsils which is unchanged from the  previous MRI.  Overall, the cervical spine appearance was stable from her MRI that was completed in February 2015.  He had no intrinsic abnormality involving the cervical or visualized upper thoracic cord, no pathologic enhancement.  There were no new disc herniations or significant right or left-sided foraminal narrowing.  At the craniovertebral junction, there is mild caudal positioning and pointing that could represent a mild Chiari malformation, this is of uncertain clinical significance however.     Over the last 4 weeks, patient has been experiencing pain located in the right scapula that radiates around the right lateral thorax to the ribs, epigastric region and above the right breast.  The pain is constant and she describes it as a knifelike and inflamed sensation that is aggravated when she goes from sitting to standing and with twisting. She is rating her pain an 8 out of 10 today.  We discussed use of steroid taper to see if we can get her more controlled, she continues to work with neurosurgery to figure out what her primary issue is with her ongoing cervical pain and carpal tunnel issues.  Recommend use of over-the-counter lidocaine patches as well along with the steroid taper over the next 12 days to see if she gets any further relief. Continue with acupuncture.     Elevated blood pressure: Blood pressure elevated x2 today, patient is currently not taking any antihypertensive therapy.  She does check her blood pressure at work periodically, she is asymptomatic today.  She denies chest pain, shortness of breath, headaches, dizziness or vision changes, no weakness or fatigue.  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  Suspect that her blood pressure is elevated today due to her ongoing discomfort in the right scapula today.  Chief Complaint   Patient presents with      Back Pain     Ongoing pain. Started to have spiking pain in upper right back. Would like some prednisone.          Patient Active Problem List   Diagnosis     Palpitations     Abdominal Pain     Joint pain localized in the shoulder      Vaginal delivery     Class 1 obesity with alveolar hypoventilation, serious comorbidity, and body mass index (BMI) of 34.0 to 34.9 in adult (H)     Chronic pain     Vitamin D deficiency disease     Hyperlipidemia     Carpal tunnel syndrome of right wrist     Binge-eating disorder, severe     Elevated blood pressure reading without diagnosis of hypertension     Pain of right scapula     Acute midline thoracic back pain       Current Outpatient Medications   Medication Sig Dispense Refill     cholecalciferol, vitamin D3, (VITAMIN D3) 2,000 unit Tab Take 2000 IU daily  0     lisdexamfetamine (VYVANSE) 30 MG capsule Take 1 capsule (30 mg total) by mouth 2 (two) times a day. 60 capsule 0     OMEGA-3/DHA/EPA/FISH OIL (FISH OIL-OMEGA-3 FATTY ACIDS) 300-1,000 mg capsule Take 2 capsules (2 g total) by mouth daily.  0     predniSONE (DELTASONE) 10 mg tablet 40 mg daily x3 days, 30 mg daily x3 days, 20 mg daily x3 days, 10 mg daily x3 days. 30 tablet 0     No current facility-administered medications for this visit.        Social History     Tobacco Use   Smoking Status Never Smoker   Smokeless Tobacco Never Used       REVIEW OF SYSTEMS: Denies  saddle anesthesia, weakness, loss of bowel/bladder control.      TOBACCO USE:  Social History     Tobacco Use   Smoking Status Never Smoker   Smokeless Tobacco Never Used     Social History     Socioeconomic History     Marital status:      Spouse name: Macario     Number of children: 5     Years of education: Not on file     Highest education level: Not on file   Social Needs     Financial resource strain: Not on file     Food insecurity - worry: Not on file     Food insecurity - inability: Not on file     Transportation needs - medical: Not on  file     Transportation needs - non-medical: Not on file   Occupational History     Occupation: RN Manager     Comment: [employer]   Tobacco Use     Smoking status: Never Smoker     Smokeless tobacco: Never Used   Substance and Sexual Activity     Alcohol use: Yes     Alcohol/week: 3.0 oz     Types: 5 Standard drinks or equivalent per week     Comment: occas.     Drug use: No     Sexual activity: Yes     Partners: Male     Birth control/protection: Condom   Other Topics Concern     Not on file   Social History Narrative     Not on file         OBJECTIVE:            Vitals:    02/01/19 1515   BP: (!) 140/96   Pulse:    Resp:    Temp:    SpO2:      Initial Weight: 253.5 lbs  Weight: 201 lb (91.2 kg)  Weight loss from initial: 52.5  % Weight loss: 20.71 %    Wt Readings from Last 3 Encounters:   01/16/19 201 lb (91.2 kg)   12/13/18 204 lb (92.5 kg)   07/24/18 178 lb (80.7 kg)     There is no height or weight on file to calculate BMI.        Physical Exam:  GENERAL APPEARANCE: A&A, NAD, well hydrated, well nourished  NECK: Supple, without lymphadenopathy, no thyroid mass, no JVD, no bruit, full ROM  CV: RRR, no M/G/R   LUNGS: CTAB, normal respiratory effort  ABDOMEN: S&NT, no masses, no organomegaly, BS present x4   BACK: no edema, full ROM. Increase right scapular pain with palpation at base of scapular and with right spinal rotation.    SKIN:  Normal skin turgor, no lesions/rashes, warm and dry   NEURO: no gross deficits

## 2021-06-23 NOTE — PROGRESS NOTES
"ACUPUNCTURIST TREATMENT NOTE    Name: Lia Lemon  :  1980  MRN:  [id number]      Acupuncture Treatment  Patient Type: JN Pain  Intervention Reason: Pain  Pre-session Pain Ratin  Post-session Pain Ratin  Patient complaint:: Patient states she felt good for a few days after last treatment, then had a rise in pain over weekend. Today feeling good again, rating 2/10. Still with tightness and numbness in right hand mainly. Tingling/numbness bothers her more at night.  Acupuncture (Points):: Liv3, Gb41, Ub62, Ki3, Gb39, Sp6, Li4, Si3, Ht7, Pc6, Baihui, Gb8, Anmian,  Gb20, Ub10, Gb21, Si9, Si10, HTJJ C6, C7. (R) Pc7, (L) si13  Magaly: XNKQ (R) Ht1, Pc4  TCM Diagnosis: liver qi and blood stasis, blood lassiter  Practitioner Observed: 30 minute treatment. Heat lamp over upper back. Tuina with posumon to neck/upper back.  Checklist: Progress Note Completed;Consent Reveiwed    \"Risks and benefits of acupuncture were discussed with patient. Consent for treatment was given. We thank you for the referral.\"     Vandana Hawkins L.Ac.    Date:  1/15/2019  Time:  12:05 PM    "

## 2021-06-23 NOTE — PROGRESS NOTES
ASSESSMENT AND PLAN:     Problem List Items Addressed This Visit        High    Binge-eating disorder, severe (Chronic)     Vyvanse 30 mg orally every morning discontinued-dose increased  Vyvanse 30 mg p.o. twice daily started today.    Follow-up in 1 month-see BMI and problem list for more details.         Relevant Medications    lisdexamfetamine (VYVANSE) 30 MG capsule    Class 1 obesity with alveolar hypoventilation, serious comorbidity, and body mass index (BMI) of 34.0 to 34.9 in adult (H)     Dx: Initial BMI 42.18 with comorbid weight related conditions including chronic pain, hyperlipidemia, and vitamin D deficiency.  She also appears to have severe binge eating disorder.     Initial Weight: 253.5 lbs bmi 42.18, 3/30/2017  Weight: 172 lb (78 kg) bmi 29.52, 5/23/2018   Weight: 178 lb (80.7 kg) bmi 30.55 7/24/2018   Weight: 204 lb (92.5 kg) Body mass index is 35.02 kg/m . 12/13/2018   Weight: 201 lb (91.2 kg) bmi 34.50, 1/16/2019   Weight loss from initial: 52.5  % Weight loss: 20.71 %     PHENTERMINE 37.5 MG PO Q DAY  3/30/2018 -1/24/2018 - 73 LB DOWN     PHENTERMINE 37.5MG PO Q DAY 5/7 D PER WEEK  1/24/2018 - 5/23/2018 - 8 LBS ADDITIONALLY DOWN (TOTAL 81.5 LB DOWN)  Starting to get difficult now, so will decrease dose 5/23/2018      PHENTERMINE 37.5 MG PO 3/7 D PER WEEK  5/23/2018 - 7/24/2018 - gained 6 lbs.   Dc phentermine 7/24/2018       CONTRAVE 2 PILLS two times a day (START SLOWLY)  7/24/2018 - - SELF DISCONTINUED.       Vyvanse 30 MG orally per morning-  indication-  severe binge eating disorder.   12/13/2018 - 1/16/2019 - she finds it helpful.   1/16/2019 INCREASED DOSE    VYVANSE 30 MG PO two times a day  1/16/2019 - INCREASED DOSE FROM 30 MG PO Q DAY.  Watch pvcs.    LIRAGLUTIDE -  1/16/2019 - DISCUSSED.        PLAN  REGAINING WEIGHT - (BMI down from 42-34 even with weight regain.)     CAP PLAN -  Less than or equal to 202 lbs- control  Between 203 and 205 lbs - action  Greater than or equal to 206  - panic      She found the vyvanse helpful but wears off quickly and not strong enough.   Increased vyvanse dose.   START VYVANSE 30 MG PO two times a day (INCREASED)    Goal this month: start to get back on the treadmill.  And increase vyvanse. Watch pacs.     Labs: Given her recent difficulties with maintaining her weight loss I would like to check thyroid, CBC, vitamin D, lipids, A1c and CMP as she is due anyway since it has been over a year since we have checked these.     Follow up in 1 MONTH         Relevant Medications    lisdexamfetamine (VYVANSE) 30 MG capsule       Unprioritized    Hyperlipidemia (Chronic)     Recommend repeat now.         Relevant Orders    Lipid Cascade    Palpitations     She states she has baseline PACs and this has not worsened with the Vyvanse.         Vitamin D deficiency disease     Recommend repeat now.         Relevant Orders    Vitamin D, Total (25-Hydroxy)      Other Visit Diagnoses     Encounter for screening for metabolic disorder    -  Primary    Relevant Orders    Comprehensive Metabolic Panel    Screening for diabetes mellitus        Relevant Orders    Glycosylated Hemoglobin A1c    Screening, anemia, deficiency, iron        Relevant Orders    HM1(CBC and Differential)    HM1 (CBC with Diff)    Screening for thyroid disorder        Relevant Orders    Thyroid Cascade           Chief Complaint   Patient presents with     Weight Loss        HPI  Lia Lemon is a 38 y.o. female comes in for weight loss follow up.    Are you experiencing any side effects to the medications:  No side effects.  She says she does have baseline PACs and this has not changed with the Vyvanse.  Hunger control:  Better with vyvanse, but still hungry in evenings.   Exercise was discussed: she has not been exercising. She just got a new one.   Taking supplements:  She is not taking fish oil, mvi,  vit d but wants to take again. She never took chromium.   Discussed journaling food:  No not doing  "this. Didn't help.  Patient is pleased with the current results:  Yes.   The patient is following the nutrition plan:  Trying.   Barriers to losing weight:  Evening hunger.      Social History     Tobacco Use   Smoking Status Never Smoker   Smokeless Tobacco Never Used      Current Outpatient Medications on File Prior to Visit   Medication Sig Dispense Refill     cholecalciferol, vitamin D3, (VITAMIN D3) 2,000 unit Tab Take 2000 IU daily  0     lisdexamfetamine (VYVANSE) 30 MG capsule Take 1 capsule (30 mg total) by mouth daily. 30 capsule 0     OMEGA-3/DHA/EPA/FISH OIL (FISH OIL-OMEGA-3 FATTY ACIDS) 300-1,000 mg capsule Take 2 capsules (2 g total) by mouth daily.  0     No current facility-administered medications on file prior to visit.       Allergies   Allergen Reactions     Morphine Hives and Itching     Can take vicodin and percocet       Review of Systems   Constitutional: Negative.    HENT: Negative.    Eyes: Negative.    Respiratory: Negative.    Cardiovascular: Negative.    Gastrointestinal: Negative.    Endocrine: Negative.    Genitourinary: Negative.    Musculoskeletal: Negative.    Skin: Negative.    Neurological: Negative.    Hematological: Negative.    Psychiatric/Behavioral: Negative.         OBJECTIVE: /86   Pulse 68   Resp 14   Ht 5' 4\" (1.626 m)   Wt 201 lb (91.2 kg)   BMI 34.50 kg/m     Physical Exam   Constitutional: She is oriented to person, place, and time. She appears well-developed and well-nourished. No distress.   HENT:   Head: Normocephalic and atraumatic.   Eyes: Conjunctivae are normal.   Neck: Neck supple. No thyromegaly present.   Cardiovascular: Normal rate, regular rhythm and normal heart sounds.   Pulmonary/Chest: Effort normal and breath sounds normal.   Musculoskeletal: Normal range of motion.   Neurological: She is alert and oriented to person, place, and time.   Skin: Skin is warm and dry.   Psychiatric: She has a normal mood and affect.        This note was created " using Dragon dictation.  Please excuse any grammatical errors.

## 2021-06-23 NOTE — PROGRESS NOTES
Patient presents at the request of Dr. Hennessy for bilateral upper extremity EMG.  She has left greater than right hand numbness and tingling digits 1 through 4 in all digits of the right hand.  She is right-handed.  Status post right carpal tunnel release within the past year.    EMG/NCS  results: Please see scanned full report.    Comment NCS: Abnormal study:    1.  Normal right and prolonged latency left median transcarpal study.  2.  Prolonged bilateral median versus ulnar transcarpal latency comparison.  3.  Normal right median and ulnar SNAPs, bilateral ulnar transcarpal studies, and bilateral median and ulnar CMAPs.    Comment EMG: Normal study.  1.  Normal needle EMG bilateral upper extremities.    Interpretation: Abnormal study:    1.  Left median neuropathy at the wrist consistent with entrapment in the carpal tunnel, mild in severity.    2.  Right median neuropathy at the wrist consistent with entrapment in the carpal tunnel, very mild in severity.      3. There is no electrodiagnostic evidence of cervical radiculopathy, brachial plexopathy, or ulnar neuropathy in the bilateral upper extremities.    The testing was completed in its entirety by the physician.      It was our pleasure caring for your patient today, if there any questions or concerns please do not hesitate to contact us.

## 2021-06-23 NOTE — PROGRESS NOTES
Lia Lemon is status post Right CTR on 5/14/2018.     Last seen on 6/12/2018 and was referred to PT.   She did not do the physical therapy.    Also has a history of Chiari I malformation.    Today she returns in follow up for discussion of her recurrent symptoms. She presents with a chief complaint of progressive occipital HA and posterior neck pain associated with pain, numbness and tingling in her both hands. Gait and balance are normal.    She saw a neurosurgeon at [facility] that recommended observation.  This was in July 2018.  I have reviewed this note.    She states that her symptoms have been progressively worse.  She does have a cine flow study from 1 year ago that shows some dampening to flow in the posterior fossa at the level of the foramen magnum.    She has no specific motor or sensory findings.  She has symptoms that include headache that is apparently related to Valsalva maneuver, cough and sneeze.  She is concerned that her Chiari may be symptomatic.    Since her imaging is over 1 year old, I have recommended a new cine flow study.  I have also asked that she have a repeat EMG of both upper extremities.  There may be a strand of ligament that I missed because she still has symptoms of carpal tunnel syndrome.  She will return after the studies are completed.  Total time spent in this evaluation of information, review of pertinent consultations, personal interpretation of imaging and face-to-face discussion was 25 minutes.

## 2021-06-23 NOTE — PROGRESS NOTES
Lia Lemon is status post Right CTR on 5/14/2018. Last seen on 6/12/2018, was referred to PT.  Also has a history of Chiari I malformation.  Today she returns in follow up for discussion of her recurrent symptoms. She presents with a chief complaint of progressive occipital HA and posterior neck pain associated with pain, numbness and tingling in her both hands. Gait and balance are normal.  NDI score is 24%  Tanya White RN, CNRN

## 2021-06-23 NOTE — TELEPHONE ENCOUNTER
Requesting new rx for vyvance last approval in Dec 2018 was for lower dose.  Pt now taking higher dose and needed new PA

## 2021-06-23 NOTE — TELEPHONE ENCOUNTER
Medication Name: Vyvanse 30mg capsule    Insurance Plan: [employer]   PBM:    Patient ID Number: [id number]    Please start PA process

## 2021-06-23 NOTE — PROGRESS NOTES
"ACUPUNCTURIST TREATMENT NOTE    Name: Lia Lemon  :  1980  MRN:  [id number]      Acupuncture Treatment  Patient Type: JN Pain  Intervention Reason: Pain  Pre-session Pain Ratin  Post-session Pain Ratin  Patient complaint:: Patient states she has been feeling more pain in mid back area lately. Still tightness and pain in right hand, however, she is feeling less numbness. States she feels good after acupuncture \"for a few days.\"  Acupuncture (Points):: Liv3, Gb41, Ub62, Ki3, Gb39, Sp6, Si3, Ht7, Pc6, Baihui, sishencong, Gb8, Anmian, Tb17, Gb20, Ub10, Gb21, Si9, Si10, Si11, HTJJ C7, Ub13, Ub15, Ub17 (R) Pc7  Magaly: XNKQ (R) Ht1, Pc4  TCM Diagnosis: liver qi and blood stasis, blood lassiter  Practitioner Observed: 30 minute treatment. Heat lamp over upper back. Tuina with posumon to upper back/neck.  Checklist: Progress Note Completed;Consent Reveiwed    \"Risks and benefits of acupuncture were discussed with patient. Consent for treatment was given. We thank you for the referral.\"     Vandana Hawkins L.Ac.    Date:  2019  Time:  1:34 PM    "

## 2021-06-23 NOTE — PATIENT INSTRUCTIONS - HE
Thank you for choosing the [facility] for your EMG testing.    The ordering provider will receive your final EMG results within the next few days.  Please follow up with your provider for the results and further treatment recommendations.'

## 2021-06-24 NOTE — PROGRESS NOTES
ASSESSMENT AND PLAN:     Problem List Items Addressed This Visit        High    Binge-eating disorder, severe (Chronic)    Relevant Medications    lisdexamfetamine (VYVANSE) 30 MG capsule    Class 1 obesity with alveolar hypoventilation, serious comorbidity, and body mass index (BMI) of 34.0 to 34.9 in adult (H)     Dx: Initial BMI 42.18 with comorbid weight related conditions including chronic pain, hyperlipidemia, and vitamin D deficiency.  She also appears to have severe binge eating disorder.     Initial Weight: 253.5 lbs bmi 42.18, 3/30/2017  Weight: 172 lb (78 kg) bmi 29.52, 5/23/2018   Weight: 178 lb (80.7 kg) bmi 30.55 7/24/2018   Weight: 204 lb (92.5 kg) Body mass index is 35.02 kg/m . 12/13/2018   Weight: 201 lb (91.2 kg) bmi 34.50, 1/16/2019   Weight: 200 lb 11.2 oz (91 kg) bmi 34.45, 2/14/2019   Weight loss from initial: 52.8  % Weight loss: 20.83 %       PHENTERMINE 37.5 MG PO Q DAY  3/30/2018 -1/24/2018 - 73 LB DOWN     PHENTERMINE 37.5MG PO Q DAY 5/7 D PER WEEK  1/24/2018 - 5/23/2018 - 8 LBS ADDITIONALLY DOWN (TOTAL 81.5 LB DOWN)  Starting to get difficult now, so will decrease dose 5/23/2018      PHENTERMINE 37.5 MG PO 3/7 D PER WEEK  5/23/2018 - 7/24/2018 - gained 6 lbs.   Dc phentermine 7/24/2018       CONTRAVE 2 PILLS two times a day (START SLOWLY)  7/24/2018 - - SELF DISCONTINUED.       Vyvanse 30 MG orally per morning-  indication-  severe binge eating disorder.   12/13/2018 - 1/16/2019 - she finds it helpful.   1/16/2019 INCREASED DOSE     VYVANSE 30 MG PO two times a day  1/16/2019 - INCREASED DOSE FROM 30 MG PO Q DAY.  Watch pvcs.     LIRAGLUTIDE -  1/16/2019 - DISCUSSED.        PLAN  WEIGHT MAINTENANCE PHASE - (BMI down from 42-34)     CAP PLAN   Less than or equal to 202 lbs- control  Between 203 and 205 lbs - action  Greater than or equal to 206 - panic      She found the vyvanse helpful but wears off quickly and not strong enough.   Increased vyvanse dose.  CONTINUE VYVANSE 30 MG PO two  times a day (INCREASED)     Goal this month: going to try and increase veggies in diet and research recipes for sauces.      Labs: 1/16/19-normal CBC and A1c is 5.1/improved, d 22.9, tsh nl, cmp nl, lipids normal (hdl high)    Vit d is 22 - recommend taking 5000 iu daily.      Follow up in 1 MONTH          Relevant Medications    lisdexamfetamine (VYVANSE) 30 MG capsule       Unprioritized    Vitamin D deficiency disease     Vit d is 22 - recommend taking 5000 iu daily.                 Chief Complaint   Patient presents with     Weight Loss        HPI  Lia Lemon is a 38 y.o. female comes in for weight loss follow up.    Did you increase the dose of vyvanse as we discussed at the last visit? Yes   how is it working? Good. Helps keep the weight off.  Wishes it was stronger.     Goal this past month:  start to get back on the treadmill.  And increase vyvanse.   Update on above goal: She did increase the Vyvanse.  We forgot to talk about the treadmill.    Go over labs below with Lia.   - done  Much vitamin D are you taking?  She is currently inconsistent with taking her vitamin D.  Should I send a prescription over for low d?  She does not need a prescription but says she has vitamin D at home that she can use.    Lets look at your blood pressure, I want to keep an eye on it with the vyvanse, it was a little high last month.  -Her blood pressure actually looks fantastic today.    Are you experiencing any side effects to the medications:    Hunger control:  good  Exercise was discussed: Not discussed.  Taking supplements: Not discussed.  Discussed journaling food: Not discussed.  Patient is pleased with the current results: Yes but wishes she could lose more.  The patient is following the nutrition plan: Trying.  She has a hard time with vegetables and does not feel like she likes the taste.  Barriers to losing weight: Needs more vegetables.    Social History     Tobacco Use   Smoking Status Never Smoker  "  Smokeless Tobacco Never Used      Current Outpatient Medications on File Prior to Visit   Medication Sig Dispense Refill     cholecalciferol, vitamin D3, (VITAMIN D3) 2,000 unit Tab Take 2000 IU daily  0     OMEGA-3/DHA/EPA/FISH OIL (FISH OIL-OMEGA-3 FATTY ACIDS) 300-1,000 mg capsule Take 2 capsules (2 g total) by mouth daily.  0     predniSONE (DELTASONE) 10 mg tablet 40 mg daily x3 days, 30 mg daily x3 days, 20 mg daily x3 days, 10 mg daily x3 days. 30 tablet 0     [DISCONTINUED] lisdexamfetamine (VYVANSE) 30 MG capsule Take 1 capsule (30 mg total) by mouth 2 (two) times a day. 60 capsule 0     [] fluconazole (DIFLUCAN) 150 MG tablet Take 1 tablet (150 mg total) by mouth daily for 2 days. 2 tablet 0     No current facility-administered medications on file prior to visit.       Allergies   Allergen Reactions     Morphine Hives and Itching     Can take vicodin and percocet         Review of Systems   Constitutional: Negative.    HENT: Negative.    Eyes: Negative.    Respiratory: Negative.    Cardiovascular: Negative.    Gastrointestinal: Negative.    Endocrine: Negative.    Genitourinary: Negative.    Musculoskeletal: Negative.    Skin: Negative.    Neurological: Negative.    Hematological: Negative.    Psychiatric/Behavioral: Negative.         OBJECTIVE: /74 (Patient Site: Left Arm, Patient Position: Sitting, Cuff Size: Adult Large)   Pulse 72   Ht 5' 4\" (1.626 m)   Wt 200 lb 11.2 oz (91 kg)   LMP 2019 (Exact Date)   BMI 34.45 kg/m     Physical Exam   Constitutional: She is oriented to person, place, and time. She appears well-developed and well-nourished. No distress.   HENT:   Head: Normocephalic and atraumatic.   Eyes: Conjunctivae are normal.   Neck: Neck supple.   Cardiovascular: Normal rate and regular rhythm.   Pulmonary/Chest: Effort normal.   Musculoskeletal: Normal range of motion.   Neurological: She is alert and oriented to person, place, and time.   Skin: Skin is warm and dry. "   Psychiatric: She has a normal mood and affect.        SUMMARY OF LABS    Results for orders placed or performed in visit on 01/16/19   Comprehensive Metabolic Panel   Result Value Ref Range    Sodium 139 136 - 145 mmol/L    Potassium 4.6 3.5 - 5.0 mmol/L    Chloride 105 98 - 107 mmol/L    CO2 22 22 - 31 mmol/L    Anion Gap, Calculation 12 5 - 18 mmol/L    Glucose 75 70 - 125 mg/dL    BUN 9 8 - 22 mg/dL    Creatinine 0.71 0.60 - 1.10 mg/dL    GFR MDRD Af Amer >60 >60 mL/min/1.73m2    GFR MDRD Non Af Amer >60 >60 mL/min/1.73m2    Bilirubin, Total 0.6 0.0 - 1.0 mg/dL    Calcium 9.3 8.5 - 10.5 mg/dL    Protein, Total 7.2 6.0 - 8.0 g/dL    Albumin 4.0 3.5 - 5.0 g/dL    Alkaline Phosphatase 87 45 - 120 U/L    AST 22 0 - 40 U/L    ALT <9 0 - 45 U/L       Lab Results   Component Value Date    WBC 4.3 01/16/2019    HGB 14.3 01/16/2019    HCT 43.2 01/16/2019    MCV 94 01/16/2019     01/16/2019       Lab Results   Component Value Date    HGBA1C 5.1 01/16/2019        Lab Results   Component Value Date    TSH 1.74 01/16/2019        Vitamin D, Total (25-Hydroxy)   Date Value Ref Range Status   01/16/2019 22.9 (L) 30.0 - 80.0 ng/mL Final        Lab Results   Component Value Date    CHOL 219 (H) 01/16/2019    CHOL 224 (H) 11/07/2017    CHOL 216 (H) 03/30/2017     Lab Results   Component Value Date    HDL 97 01/16/2019    HDL 85 11/07/2017    HDL 63 03/30/2017     Lab Results   Component Value Date    LDLCALC 111 01/16/2019    LDLCALC 129 11/07/2017    LDLCALC 137 (H) 03/30/2017     Lab Results   Component Value Date    TRIG 54 01/16/2019    TRIG 50 11/07/2017    TRIG 82 03/30/2017     No components found for: CHOLHDL      This note was created using Dragon dictation.  Please excuse any grammatical errors.

## 2021-07-03 NOTE — ADDENDUM NOTE
Addendum Note by Domonique Cline MD at 11/7/2017 11:19 AM     Author: Domonique Cline MD Service: -- Author Type: Physician    Filed: 11/7/2017 11:19 AM Encounter Date: 11/7/2017 Status: Signed    : Domonique Cline MD (Physician)    Addended by: DOMONIQUE CLINE on: 11/7/2017 11:19 AM        Modules accepted: Orders

## 2021-09-25 ENCOUNTER — HEALTH MAINTENANCE LETTER (OUTPATIENT)
Age: 41
End: 2021-09-25

## 2021-11-20 ENCOUNTER — HEALTH MAINTENANCE LETTER (OUTPATIENT)
Age: 41
End: 2021-11-20

## 2021-12-13 ENCOUNTER — IMMUNIZATION (OUTPATIENT)
Dept: NURSING | Facility: CLINIC | Age: 41
End: 2021-12-13
Payer: COMMERCIAL

## 2021-12-13 ENCOUNTER — MYC MEDICAL ADVICE (OUTPATIENT)
Dept: FAMILY MEDICINE | Facility: CLINIC | Age: 41
End: 2021-12-13

## 2021-12-13 DIAGNOSIS — I10 ESSENTIAL HYPERTENSION: Primary | ICD-10-CM

## 2021-12-13 PROCEDURE — 91300 PR COVID VAC PFIZER DIL RECON 30 MCG/0.3 ML IM: CPT

## 2021-12-13 PROCEDURE — 0004A PR COVID VAC PFIZER DIL RECON 30 MCG/0.3 ML IM: CPT

## 2021-12-13 RX ORDER — LISINOPRIL 10 MG/1
10 TABLET ORAL DAILY
Qty: 90 TABLET | Refills: 0 | Status: SHIPPED | OUTPATIENT
Start: 2021-12-13 | End: 2022-02-11

## 2021-12-13 RX ORDER — LISINOPRIL 10 MG/1
TABLET ORAL
COMMUNITY
Start: 2020-11-09 | End: 2021-12-13

## 2021-12-13 NOTE — TELEPHONE ENCOUNTER
I sent in a prescription for 90-day supply of the lisinopril.  You are extremely overdue for follow-up of this medication as well as for physical exam.  Please schedule a physical exam and we can do follow-up for the hypertension at the same time.  My first available appointment is about 2 months from now so please schedule that now that she can get in during the 90-day supply that I just gave you.

## 2021-12-20 NOTE — TELEPHONE ENCOUNTER
Celena Carpio contacted Lia on 12/20/21 and left a message. If patient calls back please schedule a med check .

## 2022-01-05 ENCOUNTER — TRANSFERRED RECORDS (OUTPATIENT)
Dept: HEALTH INFORMATION MANAGEMENT | Facility: CLINIC | Age: 42
End: 2022-01-05
Payer: COMMERCIAL

## 2022-01-11 ENCOUNTER — TRANSFERRED RECORDS (OUTPATIENT)
Dept: HEALTH INFORMATION MANAGEMENT | Facility: CLINIC | Age: 42
End: 2022-01-11
Payer: COMMERCIAL

## 2022-03-23 ENCOUNTER — OFFICE VISIT (OUTPATIENT)
Dept: FAMILY MEDICINE | Facility: CLINIC | Age: 42
End: 2022-03-23
Payer: COMMERCIAL

## 2022-03-23 VITALS
SYSTOLIC BLOOD PRESSURE: 122 MMHG | WEIGHT: 246.6 LBS | BODY MASS INDEX: 41.09 KG/M2 | OXYGEN SATURATION: 98 % | HEART RATE: 102 BPM | HEIGHT: 65 IN | DIASTOLIC BLOOD PRESSURE: 80 MMHG

## 2022-03-23 DIAGNOSIS — R00.2 PALPITATIONS: ICD-10-CM

## 2022-03-23 DIAGNOSIS — Z82.49 FAMILY HISTORY OF ISCHEMIC HEART DISEASE: ICD-10-CM

## 2022-03-23 DIAGNOSIS — D64.9 ANEMIA, UNSPECIFIED TYPE: ICD-10-CM

## 2022-03-23 DIAGNOSIS — E66.813 CLASS 3 SEVERE OBESITY DUE TO EXCESS CALORIES WITH SERIOUS COMORBIDITY AND BODY MASS INDEX (BMI) OF 40.0 TO 44.9 IN ADULT (H): ICD-10-CM

## 2022-03-23 DIAGNOSIS — E78.2 MIXED HYPERLIPIDEMIA: Chronic | ICD-10-CM

## 2022-03-23 DIAGNOSIS — R82.90 ABNORMAL URINALYSIS: ICD-10-CM

## 2022-03-23 DIAGNOSIS — Z00.00 ROUTINE GENERAL MEDICAL EXAMINATION AT A HEALTH CARE FACILITY: Primary | ICD-10-CM

## 2022-03-23 DIAGNOSIS — N95.1 MENOPAUSAL SYMPTOMS: ICD-10-CM

## 2022-03-23 DIAGNOSIS — E55.9 VITAMIN D DEFICIENCY DISEASE: ICD-10-CM

## 2022-03-23 DIAGNOSIS — I10 ESSENTIAL HYPERTENSION: ICD-10-CM

## 2022-03-23 DIAGNOSIS — E66.01 CLASS 3 SEVERE OBESITY DUE TO EXCESS CALORIES WITH SERIOUS COMORBIDITY AND BODY MASS INDEX (BMI) OF 40.0 TO 44.9 IN ADULT (H): ICD-10-CM

## 2022-03-23 DIAGNOSIS — Z23 NEED FOR HEPATITIS A VACCINATION: ICD-10-CM

## 2022-03-23 LAB
ALBUMIN SERPL-MCNC: 3.8 G/DL (ref 3.5–5)
ALBUMIN UR-MCNC: NEGATIVE MG/DL
ALP SERPL-CCNC: 111 U/L (ref 45–120)
ALT SERPL W P-5'-P-CCNC: <9 U/L (ref 0–45)
ANION GAP SERPL CALCULATED.3IONS-SCNC: 13 MMOL/L (ref 5–18)
APPEARANCE UR: CLEAR
AST SERPL W P-5'-P-CCNC: 15 U/L (ref 0–40)
BACTERIA #/AREA URNS HPF: ABNORMAL /HPF
BILIRUB SERPL-MCNC: 0.3 MG/DL (ref 0–1)
BILIRUB UR QL STRIP: NEGATIVE
BUN SERPL-MCNC: 7 MG/DL (ref 8–22)
CALCIUM SERPL-MCNC: 9.5 MG/DL (ref 8.5–10.5)
CHLORIDE BLD-SCNC: 107 MMOL/L (ref 98–107)
CHOLEST SERPL-MCNC: 193 MG/DL
CO2 SERPL-SCNC: 21 MMOL/L (ref 22–31)
COLOR UR AUTO: YELLOW
CREAT SERPL-MCNC: 0.73 MG/DL (ref 0.6–1.1)
ERYTHROCYTE [DISTWIDTH] IN BLOOD BY AUTOMATED COUNT: 17.3 % (ref 10–15)
FASTING STATUS PATIENT QL REPORTED: YES
FERRITIN SERPL-MCNC: 8 NG/ML (ref 10–130)
GFR SERPL CREATININE-BSD FRML MDRD: >90 ML/MIN/1.73M2
GLUCOSE BLD-MCNC: 103 MG/DL (ref 70–125)
GLUCOSE UR STRIP-MCNC: NEGATIVE MG/DL
HCT VFR BLD AUTO: 31.8 % (ref 35–47)
HDLC SERPL-MCNC: 62 MG/DL
HGB BLD-MCNC: 9.2 G/DL (ref 11.7–15.7)
HGB UR QL STRIP: ABNORMAL
IRON SATN MFR SERPL: 3 % (ref 20–50)
IRON SERPL-MCNC: 16 UG/DL (ref 42–175)
KETONES UR STRIP-MCNC: NEGATIVE MG/DL
LDLC SERPL CALC-MCNC: 121 MG/DL
LEUKOCYTE ESTERASE UR QL STRIP: NEGATIVE
MCH RBC QN AUTO: 21.9 PG (ref 26.5–33)
MCHC RBC AUTO-ENTMCNC: 28.9 G/DL (ref 31.5–36.5)
MCV RBC AUTO: 76 FL (ref 78–100)
NITRATE UR QL: NEGATIVE
PH UR STRIP: 6.5 [PH] (ref 5–8)
PLATELET # BLD AUTO: 449 10E3/UL (ref 150–450)
POTASSIUM BLD-SCNC: 4.5 MMOL/L (ref 3.5–5)
PROT SERPL-MCNC: 7.4 G/DL (ref 6–8)
RBC # BLD AUTO: 4.2 10E6/UL (ref 3.8–5.2)
RBC #/AREA URNS AUTO: ABNORMAL /HPF
SODIUM SERPL-SCNC: 141 MMOL/L (ref 136–145)
SP GR UR STRIP: 1.02 (ref 1–1.03)
SQUAMOUS #/AREA URNS AUTO: ABNORMAL /LPF
TIBC SERPL-MCNC: 490 UG/DL (ref 313–563)
TRANSFERRIN SERPL-MCNC: 392 MG/DL (ref 212–360)
TRIGL SERPL-MCNC: 48 MG/DL
TSH SERPL DL<=0.005 MIU/L-ACNC: 1.56 UIU/ML (ref 0.3–5)
UROBILINOGEN UR STRIP-ACNC: 0.2 E.U./DL
WBC # BLD AUTO: 5.3 10E3/UL (ref 4–11)
WBC #/AREA URNS AUTO: ABNORMAL /HPF

## 2022-03-23 PROCEDURE — 99396 PREV VISIT EST AGE 40-64: CPT | Mod: 25 | Performed by: FAMILY MEDICINE

## 2022-03-23 PROCEDURE — 90471 IMMUNIZATION ADMIN: CPT | Performed by: FAMILY MEDICINE

## 2022-03-23 PROCEDURE — 82306 VITAMIN D 25 HYDROXY: CPT | Performed by: FAMILY MEDICINE

## 2022-03-23 PROCEDURE — 83540 ASSAY OF IRON: CPT | Performed by: FAMILY MEDICINE

## 2022-03-23 PROCEDURE — 80053 COMPREHEN METABOLIC PANEL: CPT | Performed by: FAMILY MEDICINE

## 2022-03-23 PROCEDURE — 90632 HEPA VACCINE ADULT IM: CPT | Performed by: FAMILY MEDICINE

## 2022-03-23 PROCEDURE — 81001 URINALYSIS AUTO W/SCOPE: CPT | Performed by: FAMILY MEDICINE

## 2022-03-23 PROCEDURE — 36415 COLL VENOUS BLD VENIPUNCTURE: CPT | Performed by: FAMILY MEDICINE

## 2022-03-23 PROCEDURE — 85027 COMPLETE CBC AUTOMATED: CPT | Performed by: FAMILY MEDICINE

## 2022-03-23 PROCEDURE — 80061 LIPID PANEL: CPT | Performed by: FAMILY MEDICINE

## 2022-03-23 PROCEDURE — 99214 OFFICE O/P EST MOD 30 MIN: CPT | Mod: 25 | Performed by: FAMILY MEDICINE

## 2022-03-23 PROCEDURE — 84443 ASSAY THYROID STIM HORMONE: CPT | Performed by: FAMILY MEDICINE

## 2022-03-23 PROCEDURE — 82728 ASSAY OF FERRITIN: CPT | Performed by: FAMILY MEDICINE

## 2022-03-23 PROCEDURE — 84466 ASSAY OF TRANSFERRIN: CPT | Performed by: FAMILY MEDICINE

## 2022-03-23 RX ORDER — BACILLUS COAGULANS/INULIN 1B-250 MG
CAPSULE ORAL
COMMUNITY

## 2022-03-23 RX ORDER — FLUOXETINE 10 MG/1
10 CAPSULE ORAL DAILY
Qty: 60 CAPSULE | Refills: 0 | Status: SHIPPED | OUTPATIENT
Start: 2022-03-23 | End: 2022-04-18

## 2022-03-23 ASSESSMENT — ANXIETY QUESTIONNAIRES
4. TROUBLE RELAXING: SEVERAL DAYS
2. NOT BEING ABLE TO STOP OR CONTROL WORRYING: SEVERAL DAYS
1. FEELING NERVOUS, ANXIOUS, OR ON EDGE: SEVERAL DAYS
GAD7 TOTAL SCORE: 9
6. BECOMING EASILY ANNOYED OR IRRITABLE: MORE THAN HALF THE DAYS
5. BEING SO RESTLESS THAT IT IS HARD TO SIT STILL: SEVERAL DAYS
7. FEELING AFRAID AS IF SOMETHING AWFUL MIGHT HAPPEN: MORE THAN HALF THE DAYS
3. WORRYING TOO MUCH ABOUT DIFFERENT THINGS: SEVERAL DAYS
GAD7 TOTAL SCORE: 9
GAD7 TOTAL SCORE: 9
7. FEELING AFRAID AS IF SOMETHING AWFUL MIGHT HAPPEN: MORE THAN HALF THE DAYS

## 2022-03-23 ASSESSMENT — PATIENT HEALTH QUESTIONNAIRE - PHQ9
10. IF YOU CHECKED OFF ANY PROBLEMS, HOW DIFFICULT HAVE THESE PROBLEMS MADE IT FOR YOU TO DO YOUR WORK, TAKE CARE OF THINGS AT HOME, OR GET ALONG WITH OTHER PEOPLE: SOMEWHAT DIFFICULT
SUM OF ALL RESPONSES TO PHQ QUESTIONS 1-9: 7
SUM OF ALL RESPONSES TO PHQ QUESTIONS 1-9: 7

## 2022-03-23 NOTE — PROGRESS NOTES
SUBJECTIVE:   CC: Lia Lemon is an 42 year old woman who presents for preventive health visit.       Patient has been advised of split billing requirements and indicates understanding: Yes  Healthy Habits:     Getting at least 3 servings of Calcium per day:  NO    Bi-annual eye exam:  NO    Dental care twice a year:  Yes    Sleep apnea or symptoms of sleep apnea:  Daytime drowsiness    Diet:  Regular (no restrictions)    Frequency of exercise:  1 day/week    Duration of exercise:  Less than 15 minutes    Taking medications regularly:  Yes    Medication side effects:  None    PHQ-2 Total Score: 2    Additional concerns today:  No    Patient comes in today for physical exam.  She is overall healthy female.  She does have a history of vitamin D deficiency as well as hypertension hyperlipidemia and palpitations.  She overall feels like things are doing okay.  Blood pressure today is 122/80.  She denies that she is having any chest pain or shortness of breath.  She is on lisinopril and is tolerating that well for her hypertension.  She is not on any medications for high cholesterol.  She does have a history of palpitations but those seem to be under fairly good control currently.  She notes that they only come occasionally and is usually associated with her menstrual cycle. She does note that her dad had a heart attack at the age of 45 and she is wondering about any kind of cardiology baseline evaluation.  Her paternal grandfather also  at 49 of a heart attack.  She is concerned of course because now she is close to the age of 46 and does not want to be having any kind of cardiac event.  Her dad was a smoker and that may have contributed to his heart attack at a young age but she is concerned about her overall cardiac health.  I think it is reasonable for her to seek further evaluation at this point.  She does have history of vitamin D deficiency so we will go ahead and recheck a vitamin D level today.  She  does note that she has been having heavy menstrual cycles and does have an appointment this afternoon with [facility] to discuss in a uterine ablation.  She definitely notes that she is having some menopausal type symptoms and is wondering if that may be part of all of the issues that she is having currently.  She notes that she is having changes in her hair and her skin seems dry and metabolism seems to be low.  She has a hard time losing weight as well.  She notes that she is not suicidal or homicidal but she seems to be anxious and worrying unnecessarily.  She seems to be less excited about things as well.  Please see PHQ-9 and NEGRITO which are both completed in their entirety today.  She is functioning and she is doing what she needs to do.  It seems like she has difficulties around the time of her menstrual cycle as well is only she is having a lot more PMS symptoms than she has ever had before.  She is wondering about any treatment options for that as well.  She has had regular Pap smears however we do not have any records of those.  She is gotten notes through [facility] and so we will asked to get the records sent to us.  She declines HIV and hepatitis C testing.  She is not currently on any vitamin D supplements.  She has never had hepatitis A vaccinations and we did discuss that and she would like to start that series immunizations today.  She did have a patellar dislocation and was noted to have osteonecrosis of her right knee in January 2022.  She continues to follow with [facility] for that.  She declines HIV and hepatitis C testing.    Today's PHQ-2 Score:   PHQ-2 ( 1999 Pfizer) 3/23/2022   Q1: Little interest or pleasure in doing things 1   Q2: Feeling down, depressed or hopeless 1   PHQ-2 Score 2   Q1: Little interest or pleasure in doing things Several days   Q2: Feeling down, depressed or hopeless Several days   PHQ-2 Score 2       Abuse: Current or Past (Physical, Sexual or Emotional) -  No  Do you feel safe in your environment? Yes    Have you ever done Advance Care Planning? (For example, a Health Directive, POLST, or a discussion with a medical provider or your loved ones about your wishes): No, advance care planning information given to patient to review.  Advanced care planning was discussed at today's visit. Honoring choices information given today.     Social History     Tobacco Use     Smoking status: Never Smoker     Smokeless tobacco: Never Used     Tobacco comment: never smoked   Substance Use Topics     Alcohol use: Yes     Alcohol/week: 5.0 standard drinks     Types: 5 Standard drinks or equivalent per week     Comment: Alcoholic Drinks/day: occas.     If you drink alcohol do you typically have >3 drinks per day or >7 drinks per week? No    Alcohol Use 3/23/2022   Prescreen: >3 drinks/day or >7 drinks/week? No   Prescreen: >3 drinks/day or >7 drinks/week? -       Reviewed orders with patient.  Reviewed health maintenance and updated orders accordingly - Yes    Breast Cancer Screening:    Breast CA Risk Assessment (FHS-7) 3/23/2022   Do you have a family history of breast, colon, or ovarian cancer? No / Unknown         Mammogram Screening - Offered annual screening and updated Health Maintenance for mutual plan based on risk factor consideration    Pertinent mammograms are reviewed under the imaging tab.    History of abnormal Pap smear: NO - age 30- 65 PAP every 3 years recommended, patient gets her Pap smears through [facility] and will get records for us.  PAP / HPV 1/18/2019   HPV See Scanned Report     Reviewed and updated as needed this visit by clinical staff   Tobacco  Allergies  Meds   Med Hx  Surg Hx  Fam Hx  Soc Hx        Reviewed and updated as needed this visit by Provider   Tobacco  Allergies  Meds   Med Hx  Surg Hx  Fam Hx  Soc Hx         Current Outpatient Medications:      FLUoxetine (PROZAC) 10 MG capsule, Take 1 capsule (10 mg) by mouth daily,  Disp: 60 capsule, Rfl: 0     lisinopril (ZESTRIL) 10 MG tablet, TAKE 1 TABLET (10 MG) BY MOUTH DAILY., Disp: 90 tablet, Rfl: 0     Bacillus Coagulans-Inulin (PROBIOTIC) 1-250 BILLION-MG CAPS, , Disp: , Rfl:      Allergies   Allergen Reactions     Morphine Hives        Past Medical History:   Diagnosis Date     Chronic pain     upper back  pain     CTS (carpal tunnel syndrome)     bilateral     Dislocation of right patella 2022    seeing [facility], also noted osteonecrosis of that knee     Hyperlipidemia      Miscarriage     x2, one D&C, one medication, no complications     Neck pain 10/01/2020    herniated disc C6-C7, treating at [facility], had injection, not real helpful     Normal delivery     x4     Palpitations      Shoulder joint pain     right- past hx.     Vitamin D deficiency         Past Surgical History:   Procedure Laterality Date     ADRENALECTOMY Right     CORTISOL-SECRETING ADENOMA, S/P HEMORRHAGE.      SECTION      first child     DILATION AND CURETTAGE       HC REVISE MEDIAN N/CARPAL TUNNEL SURG Right 2018    Procedure: RIGHT CARPAL TUNNEL;  Surgeon: Do Hennessy MD;  Location: [facility];  Service: Neurosurgery        Family History   Problem Relation Age of Onset     Recurrent abdominal pain Mother         removed a bunch of intestine      Hypertension Father      Heart Disease Father 45        MI, a fib, cardiac ablation, ICD     Heart Disease Paternal Grandfather          at age 49 of MI     Thyroid Cancer No family hx of         Social History     Socioeconomic History     Marital status:      Spouse name: Macario     Number of children: 5     Years of education: Not on file     Highest education level: Not on file   Occupational History     Occupation: RN labor and delivery     Comment: [employer]   Tobacco Use     Smoking status: Never Smoker     Smokeless tobacco: Never Used     Tobacco comment: never smoked   Vaping Use     Vaping Use: Never used    Substance and Sexual Activity     Alcohol use: Yes     Alcohol/week: 5.0 standard drinks     Types: 5 Standard drinks or equivalent per week     Comment: Alcoholic Drinks/day: occas.     Drug use: No     Sexual activity: Yes     Partners: Male     Birth control/protection: Surgical     Comment: vasectomy   Other Topics Concern     Parent/sibling w/ CABG, MI or angioplasty before 65F 55M? Not Asked   Social History Narrative     Not on file     Social Determinants of Health     Financial Resource Strain: Not on file   Food Insecurity: Not on file   Transportation Needs: Not on file   Physical Activity: Not on file   Stress: Not on file   Social Connections: Not on file   Intimate Partner Violence: Not on file   Housing Stability: Not on file        Immunization History   Administered Date(s) Administered     COVID-19,PF,Pfizer (12+ Yrs) 2021, 2021, 2021     DT (PEDS <7y) 1997     DTaP, Unspecified 2015     FLU 6-35 months 2011, 2012, 10/10/2013, 10/04/2016     Flu, Unspecified 10/27/2014, 10/15/2015     Hep B, Peds or Adolescent 1993, 1993, 1994     HepA-Adult 2022     Historical DTP/aP 1981, 1981, 1981, 1982, 1985     Influenza (H1N1) 2009     Influenza (IIV3) PF 10/27/2014, 10/08/2015     Influenza Vaccine IM > 6 months Valent IIV4 (Alfuria,Fluzone) 10/06/2017, 2019, 10/26/2021     Influenza,INJ,MDCK,PF,Quad >4yrs 2020     MMR 1982, 1993     Mantoux Tuberculin Skin Test 2006     OPV, trivalent, live 1981, 1981, 1982, 1985     Rhogam 2011     TD (ADULT, 7+) 2006     Td (Adult), Adsorbed 1982, 1985, 1997     Tdap (Adacel,Boostrix) 2011, 2015        OB History    Para Term  AB Living   7 5 5 0 2 5   SAB IAB Ectopic Multiple Live Births   2 0 0 0 5      # Outcome Date GA Lbr Nick/2nd Weight Sex Delivery Anes  "PTL Lv   7 Term 16 39w0d / 00:07 3.714 kg (8 lb 3 oz) M  EPI N VIOLETTE      Birth Comments: Pregnancy not planned, no difficulty getting pregnant, no illness during pregnancy, baby arrived on time, baby had 1 night in NICU for apneic episode, no concerns during labor or delivery; no troubles starting to breathe, no hormones taken during pregnancy, no smoking during pregnancy; vaginal delivery      Apgar1: 9  Apgar5: 9   6 Term 11 38w0d  3.997 kg (8 lb 13 oz) M  EPI  VIOLETTE   5 Term 09 39w0d  4.026 kg (8 lb 14 oz) M  None  VIOLETTE   4 Term 08 38w0d  3.714 kg (8 lb 3 oz) M  EPI  VIOLETTE   3 Term 07 39w0d  4.139 kg (9 lb 2 oz) M CS-LTranv EPI N VIOLETTE   2 SAB 06              Birth Comments: D&C   1 SAB 03 8w0d                 Review of Systems  See below     OBJECTIVE:   /80   Pulse 102   Ht 1.648 m (5' 4.9\")   Wt 111.9 kg (246 lb 9.6 oz)   LMP 2022 (Exact Date)   SpO2 98%   Breastfeeding No   BMI 41.16 kg/m    Physical Exam  REVIEW OF SYSTEMS:  Denies fever, chills, visual changes, fatigue, myalgias, nasal congestion, rhinorrhea, ear pain or discharge, sore throat, swollen glands, breast mass, nipple discharge, breast changes, abdominal pain, nausea, vomiting, diarrhea, constipation, cough, shortness of breath, chest pain, weight change, change in bowel habits, melena, rectal bleeding, dysuria, frequency, urgency, hematuria, polyuria, polydipsia, polyphagia, joint pain or swelling or erythema, edema, rash, weakness, paresthesias, vaginal discharge or bleeding or mood changes other than that mentioned above in HPI.   Remainder of review of systems was negative.      PHYSICAL EXAM:  On exam, patient is a WD, WN 42 year old female in [facility].  /80   Pulse 102   Ht 1.648 m (5' 4.9\")   Wt 111.9 kg (246 lb 9.6 oz)   LMP 2022 (Exact Date)   SpO2 98%   Breastfeeding No   BMI 41.16 kg/m    Head normocephalic, atraumatic.  Eyes PERRL, ears TM s clear " bilaterally.  Throat without significant erythemia or exudate.  Neck was supple, full range of motion. No significant lymphadenopathy or thyromegaly was appreciated.  Lungs clear to auscultation  Heart regular rate and rhythm.  Breast exam was done. No masses were appreciated. Axilla were clear bilaterally. No nipple discharge was appreciated.   Abdomen was soft, nontender, nondistended. No masses or organomegaly were palpated. Positive bowel sounds were appreciated.  Extremities with full range of motion of all 4 extremities were noted.  Deep tendon reflexes were equal and symmetrical. Motor and sensation were intact to both the upper and lower extremities.  Cranial nerves 2 through 12 were grossly intact.  EOM were intact.  Patient declined pelvic exam and Pap smear given the fact that she is going to see the gynecologist this afternoon.  A&O x3, thought processes congruent, non-tangential. No hallucinations/delusions. Insight/judgment: intact. Denies suicidal/homicidal ideations.    PHQ-9:  Last PHQ-9 3/23/2022   1.  Little interest or pleasure in doing things 1   2.  Feeling down, depressed, or hopeless 1   3.  Trouble falling or staying asleep, or sleeping too much 2   4.  Feeling tired or having little energy 1   5.  Poor appetite or overeating 1   6.  Feeling bad about yourself 0   7.  Trouble concentrating 1   8.  Moving slowly or restless 0   Q9: Thoughts of better off dead/self-harm past 2 weeks 0   PHQ-9 Total Score 7   Difficulty at work, home, or with people -       GAD7:  NEGRITO-7  3/23/2022   1. Feeling nervous, anxious, or on edge 1   2. Not being able to stop or control worrying 1   3. Worrying too much about different things 1   4. Trouble relaxing 1   5. Being so restless that it is hard to sit still 1   6. Becoming easily annoyed or irritable 2   7. Feeling afraid, as if something awful might happen 2   NEGRITO-7 Total Score 9   If you checked any problems, how difficult have they made it for you to do  your work, take care of things at home, or get along with other people? -         LABS:    Recent Results (from the past 240 hour(s))   Lipid panel reflex to direct LDL Fasting    Collection Time: 03/23/22 10:08 AM   Result Value Ref Range    Cholesterol 193 <=199 mg/dL    Triglycerides 48 <=149 mg/dL    Direct Measure HDL 62 >=50 mg/dL    LDL Cholesterol Calculated 121 <=129 mg/dL    Patient Fasting > 8hrs? Yes    UA reflex to Microscopic and Culture    Collection Time: 03/23/22 10:08 AM    Specimen: Urine, Midstream   Result Value Ref Range    Color Urine Yellow Colorless, Straw, Light Yellow, Yellow    Appearance Urine Clear Clear    Glucose Urine Negative Negative mg/dL    Bilirubin Urine Negative Negative    Ketones Urine Negative Negative mg/dL    Specific Gravity Urine 1.025 1.005 - 1.030    Blood Urine Moderate (A) Negative    pH Urine 6.5 5.0 - 8.0    Protein Albumin Urine Negative Negative mg/dL    Urobilinogen Urine 0.2 0.2, 1.0 E.U./dL    Nitrite Urine Negative Negative    Leukocyte Esterase Urine Negative Negative   Comprehensive metabolic panel    Collection Time: 03/23/22 10:08 AM   Result Value Ref Range    Sodium 141 136 - 145 mmol/L    Potassium 4.5 3.5 - 5.0 mmol/L    Chloride 107 98 - 107 mmol/L    Carbon Dioxide (CO2) 21 (L) 22 - 31 mmol/L    Anion Gap 13 5 - 18 mmol/L    Urea Nitrogen 7 (L) 8 - 22 mg/dL    Creatinine 0.73 0.60 - 1.10 mg/dL    Calcium 9.5 8.5 - 10.5 mg/dL    Glucose 103 70 - 125 mg/dL    Alkaline Phosphatase 111 45 - 120 U/L    AST 15 0 - 40 U/L    ALT <9 0 - 45 U/L    Protein Total 7.4 6.0 - 8.0 g/dL    Albumin 3.8 3.5 - 5.0 g/dL    Bilirubin Total 0.3 0.0 - 1.0 mg/dL    GFR Estimate >90 >60 mL/min/1.73m2   CBC with platelets    Collection Time: 03/23/22 10:08 AM   Result Value Ref Range    WBC Count 5.3 4.0 - 11.0 10e3/uL    RBC Count 4.20 3.80 - 5.20 10e6/uL    Hemoglobin 9.2 (L) 11.7 - 15.7 g/dL    Hematocrit 31.8 (L) 35.0 - 47.0 %    MCV 76 (L) 78 - 100 fL    MCH 21.9 (L)  26.5 - 33.0 pg    MCHC 28.9 (L) 31.5 - 36.5 g/dL    RDW 17.3 (H) 10.0 - 15.0 %    Platelet Count 449 150 - 450 10e3/uL   Vitamin D Deficiency    Collection Time: 03/23/22 10:08 AM   Result Value Ref Range    Vitamin D, Total (25-Hydroxy) 25 20 - 75 ug/L   TSH with free T4 reflex    Collection Time: 03/23/22 10:08 AM   Result Value Ref Range    TSH 1.56 0.30 - 5.00 uIU/mL   Urine Microscopic    Collection Time: 03/23/22 10:08 AM   Result Value Ref Range    Bacteria Urine Few (A) None Seen /HPF    RBC Urine 2-5 (A) 0-2 /HPF /HPF    WBC Urine None Seen 0-5 /HPF /HPF    Squamous Epithelials Urine Few (A) None Seen /LPF   Iron binding panel    Collection Time: 03/23/22 10:08 AM   Result Value Ref Range    Iron 16 (L) 42 - 175 ug/dL    Transferrin 392 (H) 212 - 360 mg/dL    Transferrin Saturation, Calculated 3 (L) 20 - 50 %    Transferrin IBC, Calculated 490 313 - 563 ug/dL   Ferritin    Collection Time: 03/23/22 10:08 AM   Result Value Ref Range    Ferritin 8 (L) 10 - 130 ng/mL         ASSESSMENT/PLAN:   ASSESSMENT: 42 year old female physical exam and pap smear.    PLAN:     Routine general medical examination at a health care facility [Z00.00]    1. Routine general medical examination at a health care facility  - Lipid panel reflex to direct LDL Fasting; Future  - UA reflex to Microscopic and Culture; Future  - CBC with platelets; Future  - Lipid panel reflex to direct LDL Fasting  - UA reflex to Microscopic and Culture  - CBC with platelets  - Urine Microscopic    2. Vitamin D deficiency disease  - Vitamin D Deficiency; Future  - Vitamin D Deficiency    3. Essential hypertension  - Lipid panel reflex to direct LDL Fasting; Future  - Comprehensive metabolic panel; Future  - Adult Cardiology Eval  Referral; Future  - Lipid panel reflex to direct LDL Fasting  - Comprehensive metabolic panel    4. Mixed hyperlipidemia  - Lipid panel reflex to direct LDL Fasting; Future  - Comprehensive metabolic panel; Future  -  Adult Cardiology Eval  Referral; Future  - Lipid panel reflex to direct LDL Fasting  - Comprehensive metabolic panel    5. Palpitations  - Comprehensive metabolic panel; Future  - CBC with platelets; Future  - TSH with free T4 reflex; Future  - Comprehensive metabolic panel  - CBC with platelets  - TSH with free T4 reflex    6. Menopausal symptoms  - FLUoxetine (PROZAC) 10 MG capsule; Take 1 capsule (10 mg) by mouth daily  Dispense: 60 capsule; Refill: 0    7. Class 3 severe obesity due to excess calories with serious comorbidity and body mass index (BMI) of 40.0 to 44.9 in adult (H)    8. Family history of ischemic heart disease  - Adult Cardiology Eval  Referral; Future    9. Need for hepatitis A vaccination  - HEPATITIS A VACCINE (ADULT)    10. Anemia, unspecified type  - Ferritin; Future  - Iron binding panel; Future  - Iron binding panel  - Ferritin    11. Abnormal urinalysis  - UA reflex to Microscopic and Culture; Future      Patient is a 42 year old female who is overall doing well.  She is overall feeling well.  She does have an appointment later this afternoon with OB/GYN to evaluate for possible uterine ablation.  She notes that her menstrual cycles are very heavy.  Interestingly her hemoglobin returned today at 9.2 which would certainly fit with heavy menstrual cycles.  I did add a ferritin as well as a iron studies to her labs that were drawn earlier today to further evaluate for possibility of iron deficiency anemia versus other types of anemia.  These results did return suggesting that this certainly is iron deficiency anemia.  She was encouraged to talk with the gynecologist about ablation especially given the fact that now she is anemic as a result of it.  She does have a history of vitamin D deficiency so we will go ahead and check a vitamin D level.  She is not currently on any vitamin D supplementation.  She does have a history of hypertension as well as hyperlipidemia.  We will  check metabolic panel and lipid panel to follow-up on those 2.  She continues on Zestril for blood pressure which is working fine.  Her blood pressure today is 122/80.  She is tolerating this medication without any difficulties.  She does not need a refill of the medicine today.  She does have occasional palpitations which actually seem to be associated with her menstrual cycles but they are not certainly bothersome to her.  We will continue to monitor those for now.  She does have a history of her dad having heart attack at age 45 and her paternal grandfather dying of a heart attack at age 49.  She is very concerned about her cardiac status and whether or not she would be at risk for cardiac abnormalities.  Given that we have that strong family history of cardiac abnormalities I would going to go ahead and refer to the cardiologist for overall evaluation.  She may need a cardiac score she may be something more specific but will refer to cardiology and they can determine what the appropriate next steps would be.  She declines HIV and hepatitis C testing.  Please see PHQ-9 and NEGRITO which are both completed in their entirety today.  She notes that recently she has felt a lot of symptoms that she attributed to menopause.  She her hair and her skin have been very dry she is also noted that her metabolism is been down and she is having a difficult time losing weight.  We will go ahead and check thyroid level as well as CBC and electrolytes to evaluate this further.  I do think she may benefit from a small dose of Prozac given the fact that she also is having emotional issues where she is less excited about things and she seems to be anxious and worrying about things that she does not necessarily need to be worrying about.  She is not suicidal or homicidal but again I think she would benefit from small dose of Prozac and she was started on Prozac 10 mg today.  I did give her 60 days of that medicine and we asked her to  "follow-up in around that time for recheck.  She does have a history of right knee patellar dislocation as well as osteonecrosis that was noted in 2022.  This seems to be doing okay currently.  She seems to have healed from the acute phase of that we will continue to monitor things carefully.  If she has additional concerns or questions will certainly let me know peer honoring choices information was given today.  Her last mammogram was 2021 so she is due for another mammogram in about couple of months.  That was discussed today as well.  She did start the hepatitis A series immunizations today and knows to return in about 6 months for her second hepatitis A vaccination.  She does not have a Pap smear on record here at the office.  She gets them through [facility] and will get records sent to us.  All of her questions and concerns were addressed today.  If she has additional problems or concerns should let me know.    Will contact her with the results of the labs when available.    Voice recognition software was used in the creation of this note. Any grammatical or nonsensical errors are due to inherent errors with the software and are not the intention of the writer.      Alicia Kenney MD       Patient has been advised of split billing requirements and indicates understanding: Yes    COUNSELING:  Reviewed preventive health counseling, as reflected in patient instructions       Regular exercise       Healthy diet/nutrition       Immunizations    Vaccinated for: Hepatitis A             Consider Hep C screening for all patients one time for ages 18-79 years       HIV screeninx in teen years, 1x in adult years, and at intervals if high risk       ([place])menopause management    Estimated body mass index is 41.16 kg/m  as calculated from the following:    Height as of this encounter: 1.648 m (5' 4.9\").    Weight as of this encounter: 111.9 kg (246 lb 9.6 oz).    Weight management plan: " Discussed healthy diet and exercise guidelines    She reports that she has never smoked. She has never used smokeless tobacco.      Counseling Resources:  ATP IV Guidelines  Pooled Cohorts Equation Calculator  Breast Cancer Risk Calculator  BRCA-Related Cancer Risk Assessment: FHS-7 Tool  FRAX Risk Assessment  ICSI Preventive Guidelines  Dietary Guidelines for Americans, 2010  USDA's MyPlate  ASA Prophylaxis  Lung CA Screening    Alicia Kenney MD  [facility]  Answers for HPI/ROS submitted by the patient on 3/23/2022  If you checked off any problems, how difficult have these problems made it for you to do your work, take care of things at home, or get along with other people?: Somewhat difficult  PHQ9 TOTAL SCORE: 7  NEGRITO 7 TOTAL SCORE: 9

## 2022-03-24 LAB — DEPRECATED CALCIDIOL+CALCIFEROL SERPL-MC: 25 UG/L (ref 20–75)

## 2022-03-24 ASSESSMENT — PATIENT HEALTH QUESTIONNAIRE - PHQ9: SUM OF ALL RESPONSES TO PHQ QUESTIONS 1-9: 7

## 2022-03-24 ASSESSMENT — ANXIETY QUESTIONNAIRES: GAD7 TOTAL SCORE: 9

## 2022-03-25 ENCOUNTER — MYC MEDICAL ADVICE (OUTPATIENT)
Dept: FAMILY MEDICINE | Facility: CLINIC | Age: 42
End: 2022-03-25
Payer: COMMERCIAL

## 2022-03-25 DIAGNOSIS — D64.9 ANEMIA, UNSPECIFIED TYPE: Primary | ICD-10-CM

## 2022-03-30 ENCOUNTER — LAB (OUTPATIENT)
Dept: LAB | Facility: CLINIC | Age: 42
End: 2022-03-30
Payer: COMMERCIAL

## 2022-03-30 DIAGNOSIS — D64.9 ANEMIA, UNSPECIFIED TYPE: ICD-10-CM

## 2022-03-30 DIAGNOSIS — D50.9 IRON DEFICIENCY ANEMIA, UNSPECIFIED IRON DEFICIENCY ANEMIA TYPE: Primary | ICD-10-CM

## 2022-03-30 DIAGNOSIS — R82.90 ABNORMAL URINALYSIS: ICD-10-CM

## 2022-03-30 LAB
ALBUMIN UR-MCNC: NEGATIVE MG/DL
APPEARANCE UR: CLEAR
BILIRUB UR QL STRIP: NEGATIVE
COLOR UR AUTO: ABNORMAL
ERYTHROCYTE [DISTWIDTH] IN BLOOD BY AUTOMATED COUNT: 17.3 % (ref 10–15)
GLUCOSE UR STRIP-MCNC: NEGATIVE MG/DL
HCT VFR BLD AUTO: 29.9 % (ref 35–47)
HGB BLD-MCNC: 8.7 G/DL (ref 11.7–15.7)
HGB UR QL STRIP: NEGATIVE
KETONES UR STRIP-MCNC: ABNORMAL MG/DL
LEUKOCYTE ESTERASE UR QL STRIP: NEGATIVE
MCH RBC QN AUTO: 21.5 PG (ref 26.5–33)
MCHC RBC AUTO-ENTMCNC: 29.1 G/DL (ref 31.5–36.5)
MCV RBC AUTO: 74 FL (ref 78–100)
NITRATE UR QL: NEGATIVE
PH UR STRIP: 5.5 [PH] (ref 5–7)
PLATELET # BLD AUTO: 468 10E3/UL (ref 150–450)
RBC # BLD AUTO: 4.05 10E6/UL (ref 3.8–5.2)
SP GR UR STRIP: 1.02 (ref 1–1.03)
UROBILINOGEN UR STRIP-MCNC: <2 MG/DL
WBC # BLD AUTO: 6.8 10E3/UL (ref 4–11)

## 2022-03-30 PROCEDURE — 85027 COMPLETE CBC AUTOMATED: CPT

## 2022-03-30 PROCEDURE — 81003 URINALYSIS AUTO W/O SCOPE: CPT

## 2022-03-30 PROCEDURE — 36415 COLL VENOUS BLD VENIPUNCTURE: CPT

## 2022-04-06 ENCOUNTER — LAB (OUTPATIENT)
Dept: LAB | Facility: CLINIC | Age: 42
End: 2022-04-06
Payer: COMMERCIAL

## 2022-04-06 DIAGNOSIS — D50.9 IRON DEFICIENCY ANEMIA, UNSPECIFIED IRON DEFICIENCY ANEMIA TYPE: ICD-10-CM

## 2022-04-06 LAB
BASOPHILS # BLD AUTO: 0.1 10E3/UL (ref 0–0.2)
BASOPHILS NFR BLD AUTO: 1 %
EOSINOPHIL # BLD AUTO: 0.1 10E3/UL (ref 0–0.7)
EOSINOPHIL NFR BLD AUTO: 1 %
ERYTHROCYTE [DISTWIDTH] IN BLOOD BY AUTOMATED COUNT: 18.2 % (ref 10–15)
HCT VFR BLD AUTO: 31.2 % (ref 35–47)
HGB BLD-MCNC: 8.9 G/DL (ref 11.7–15.7)
IMM GRANULOCYTES # BLD: 0 10E3/UL
IMM GRANULOCYTES NFR BLD: 0 %
LYMPHOCYTES # BLD AUTO: 1.8 10E3/UL (ref 0.8–5.3)
LYMPHOCYTES NFR BLD AUTO: 23 %
MCH RBC QN AUTO: 21.5 PG (ref 26.5–33)
MCHC RBC AUTO-ENTMCNC: 28.5 G/DL (ref 31.5–36.5)
MCV RBC AUTO: 75 FL (ref 78–100)
MONOCYTES # BLD AUTO: 0.7 10E3/UL (ref 0–1.3)
MONOCYTES NFR BLD AUTO: 9 %
NEUTROPHILS # BLD AUTO: 5.3 10E3/UL (ref 1.6–8.3)
NEUTROPHILS NFR BLD AUTO: 66 %
NRBC # BLD AUTO: 0 10E3/UL
NRBC BLD AUTO-RTO: 0 /100
PLATELET # BLD AUTO: 454 10E3/UL (ref 150–450)
RBC # BLD AUTO: 4.14 10E6/UL (ref 3.8–5.2)
WBC # BLD AUTO: 8 10E3/UL (ref 4–11)

## 2022-04-06 PROCEDURE — 36415 COLL VENOUS BLD VENIPUNCTURE: CPT

## 2022-04-06 PROCEDURE — 85025 COMPLETE CBC W/AUTO DIFF WBC: CPT

## 2022-04-08 ENCOUNTER — OFFICE VISIT (OUTPATIENT)
Dept: CARDIOLOGY | Facility: CLINIC | Age: 42
End: 2022-04-08
Payer: COMMERCIAL

## 2022-04-08 ENCOUNTER — TELEPHONE (OUTPATIENT)
Dept: FAMILY MEDICINE | Facility: CLINIC | Age: 42
End: 2022-04-08

## 2022-04-08 VITALS
RESPIRATION RATE: 19 BRPM | DIASTOLIC BLOOD PRESSURE: 74 MMHG | OXYGEN SATURATION: 98 % | BODY MASS INDEX: 40.82 KG/M2 | HEART RATE: 94 BPM | SYSTOLIC BLOOD PRESSURE: 128 MMHG | HEIGHT: 65 IN | WEIGHT: 245 LBS

## 2022-04-08 DIAGNOSIS — I10 ESSENTIAL HYPERTENSION: ICD-10-CM

## 2022-04-08 DIAGNOSIS — R06.09 DYSPNEA ON EXERTION: Primary | ICD-10-CM

## 2022-04-08 DIAGNOSIS — D50.9 IRON DEFICIENCY ANEMIA, UNSPECIFIED IRON DEFICIENCY ANEMIA TYPE: Primary | ICD-10-CM

## 2022-04-08 DIAGNOSIS — Z82.49 FAMILY HISTORY OF ISCHEMIC HEART DISEASE: ICD-10-CM

## 2022-04-08 DIAGNOSIS — E78.2 MIXED HYPERLIPIDEMIA: Chronic | ICD-10-CM

## 2022-04-08 LAB
ATRIAL RATE - MUSE: 87 BPM
DIASTOLIC BLOOD PRESSURE - MUSE: NORMAL MMHG
INTERPRETATION ECG - MUSE: NORMAL
P AXIS - MUSE: 11 DEGREES
PR INTERVAL - MUSE: 156 MS
QRS DURATION - MUSE: 80 MS
QT - MUSE: 390 MS
QTC - MUSE: 469 MS
R AXIS - MUSE: 8 DEGREES
SYSTOLIC BLOOD PRESSURE - MUSE: NORMAL MMHG
T AXIS - MUSE: -7 DEGREES
VENTRICULAR RATE- MUSE: 87 BPM

## 2022-04-08 PROCEDURE — 93000 ELECTROCARDIOGRAM COMPLETE: CPT | Performed by: INTERNAL MEDICINE

## 2022-04-08 PROCEDURE — 99204 OFFICE O/P NEW MOD 45 MIN: CPT | Performed by: INTERNAL MEDICINE

## 2022-04-08 RX ORDER — OMEPRAZOLE 20 MG/1
20 TABLET, DELAYED RELEASE ORAL DAILY
COMMUNITY
End: 2023-09-27

## 2022-04-08 NOTE — PATIENT INSTRUCTIONS
It was nice to visit with you today.  We are going to plan on ultrasound of your heart and a CT angiogram.  Please let me know if you have any additional questions or symptoms and then we can make a plan from there.  You can either write to me on my chart or call my nurse Dominique.  Her number is [phone].  I will also look into the potential endocrinologist at the [facility].  Please send me a note next week if you have not yet heard back from me.

## 2022-04-08 NOTE — LETTER
4/8/2022    Alicia Kenney MD  [address] [facility] [facility]  [facility] [address]  [facility] [address]    RE: Lia Lemon       Dear Colleague,     I had the pleasure of seeing Lia Lemon in the [facility].    HEART CARE ENCOUNTER CONSULTATON NOTE      [facility]  [phone]      Assessment/Recommendations   Assessment/Plan:  43 yo female with a history of hypertension,hyperlipidemia and family history of premature heart disease with symptoms of dyspnea on exertion.Dyspnea on exertion likely multifactorial secondary to anemia and conditioning.Given risk factors and symptoms of exertional dyspnea we discussed ct coronary angiogram and cardiac echo.Pending the results further comments regarding recommendations and risk modification.    2.Hypertension.Appears controlled on the current dosing of Lisinopril.  3.Dyslipidemia.Diet and lifestyle changes.Pending the results of the CT angiogram further recommendations pending.  4.Iron deficiency anemia.As per her primary and OB GYN providers.      Plan CT angiogram.Risks, benefits and alternatives discussed.She is aware of the contrast and xray exposure.  Echocardiogram to further define cardiac causes of dyspnea and LVH with hypertension       History of Present Illness/Subjective    42-year-old female who is seen as a consultationper her primary care physician secondary to significant for history of heart disease. She has a history of hypertension and hyperlipidemia.Blood pressure has been under reasonable control with Lisinopril.She has a history of palpitations which has been less frequent as of late.She does endorse shortness of breath with climbing stairs but does not experience chest discomfort.She is noted to be significantly anemic.She has discussed with her OB-GYN physician heavy menstrual cycles and possible uterine ablation.She has a very active life with her children and work life.    Cardiac risk factors are negative for  tobacco or significant alcohol use.Posirive for hypertension,hyperlipidemia and significant family history of heart disease.Negative for significant gestational diabetes and negative for preeclampsia.    Her father had his first MI and 45 with subsequent heart failure,atrial fibrillation and ICD implantation.Paternal grandfather  at age 49 of a heart attack.    ECG today Sinus rhythm,normal ECG     Physical Examination  Review of Systems   Vitals: LMP 2022 (Exact Date)   128/74,80-90,245 lb  Wt Readings from Last 3 Encounters:   22 111.9 kg (246 lb 9.6 oz)   21 110.2 kg (242 lb 14.4 oz)   10/14/20 107.5 kg (237 lb)       General Appearance:   no distress, normal body habitus   ENT/Mouth: membranes moist, no oral lesions or bleeding gums.      EYES:  no scleral icterus, normal conjunctivae   Neck: no carotid bruits or thyromegaly   Chest/Lungs:   lungs are clear to auscultation, no rales or wheezing,  equal chest wall expansion    Cardiovascular:   Regular. Normal first and second heart sounds with no murmurs, rubs, or gallops; the carotid, radial and posterior tibial pulses are intact, Jugular venous pressure within normal limits, no edema bilaterally    Abdomen:  no organomegaly, masses, bruits, or tenderness; bowel sounds are present   Extremities: no cyanosis or clubbing   Skin: no xanthelasma, warm.    Neurologic: , no tremors     Psychiatric: alert and oriented x3, calm        Please refer above for cardiac ROS details.        Medical History  Surgical History Family History Social History   Past Medical History:   Diagnosis Date     Chronic pain     upper back  pain     CTS (carpal tunnel syndrome)     bilateral     Dislocation of right patella 2022    seeing [place], also noted osteonecrosis of that knee     Hyperlipidemia      Miscarriage     x2, one D&C, one medication, no complications     Neck pain 10/01/2020    herniated disc C6-C7, treating at [place], had injection, not real  helpful     Normal delivery     x4     Palpitations      Shoulder joint pain     right- past hx.     Vitamin D deficiency      Past Surgical History:   Procedure Laterality Date     ADRENALECTOMY Right     CORTISOL-SECRETING ADENOMA, S/P HEMORRHAGE.      SECTION  2007    first child     DILATION AND CURETTAGE  2006     HC REVISE MEDIAN N/CARPAL TUNNEL SURG Right 2018    Procedure: RIGHT CARPAL TUNNEL;  Surgeon: Do Hennessy MD;  Location: [facility];  Service: Neurosurgery     Family History   Problem Relation Age of Onset     Recurrent abdominal pain Mother         removed a bunch of intestine      Hypertension Father      Heart Disease Father 45        MI, a fib, cardiac ablation, ICD     Heart Disease Paternal Grandfather          at age 49 of MI     Thyroid Cancer No family hx of         Social History     Socioeconomic History     Marital status:      Spouse name: Macario     Number of children: 5     Years of education: Not on file     Highest education level: Not on file   Occupational History     Occupation: RN labor and delivery     Comment: [employer]   Tobacco Use     Smoking status: Never Smoker     Smokeless tobacco: Never Used     Tobacco comment: never smoked   Vaping Use     Vaping Use: Never used   Substance and Sexual Activity     Alcohol use: Yes     Alcohol/week: 5.0 standard drinks     Types: 5 Standard drinks or equivalent per week     Comment: Alcoholic Drinks/day: occas.     Drug use: No     Sexual activity: Yes     Partners: Male     Birth control/protection: Surgical     Comment: vasectomy   Other Topics Concern     Parent/sibling w/ CABG, MI or angioplasty before 65F 55M? Not Asked   Social History Narrative     Not on file     Social Determinants of Health     Financial Resource Strain: Not on file   Food Insecurity: Not on file   Transportation Needs: Not on file   Physical Activity: Not on file   Stress: Not on file   Social Connections: Not on file    Intimate Partner Violence: Not on file   Housing Stability: Not on file           Medications  Allergies   Current Outpatient Medications   Medication Sig Dispense Refill     Bacillus Coagulans-Inulin (PROBIOTIC) 1-250 BILLION-MG CAPS        FLUoxetine (PROZAC) 10 MG capsule Take 1 capsule (10 mg) by mouth daily 60 capsule 0     lisinopril (ZESTRIL) 10 MG tablet TAKE 1 TABLET (10 MG) BY MOUTH DAILY. 90 tablet 0       Allergies   Allergen Reactions     Morphine Hives          Lab Results    Chemistry/lipid CBC Cardiac Enzymes/BNP/TSH/INR   Recent Labs   Lab Test 03/23/22  1008   CHOL 193   HDL 62      TRIG 48     Recent Labs   Lab Test 03/23/22  1008 10/14/20  0916 01/16/19  0934    129 111     Recent Labs   Lab Test 03/23/22  1008      POTASSIUM 4.5   CHLORIDE 107   CO2 21*      BUN 7*   CR 0.73   GFRESTIMATED >90   ROXANN 9.5     Recent Labs   Lab Test 03/23/22  1008 10/14/20  0916 10/03/19  1116   CR 0.73 0.72 0.73     Recent Labs   Lab Test 01/16/19  0934 03/30/17  0815   A1C 5.1 5.4          Recent Labs   Lab Test 04/06/22  1057   WBC 8.0   HGB 8.9*   HCT 31.2*   MCV 75*   *     Recent Labs   Lab Test 04/06/22  1057 03/30/22  1250 03/23/22  1008   HGB 8.9* 8.7* 9.2*    No results for input(s): TROPONINI in the last 15949 hours.  No results for input(s): BNP, NTBNPI, NTBNP in the last 10708 hours.  Recent Labs   Lab Test 03/23/22  1008   TSH 1.56     No results for input(s): INR in the last 98351 hours.     Elias Oneil MD                Thank you for allowing me to participate in the care of your patient.      Sincerely,     Elias Oneil MD     [facility]  cc:   Alicia Kenney MD  [address]  [address] [address]

## 2022-04-08 NOTE — TELEPHONE ENCOUNTER
Reason for Call:  Other referral for EGD    Detailed comments: Carrie calling from [facility]-pt called them to schedule and wants to do both and EGD and a colonoscopy but they only have requests for colonoscopy.  Need order for EGD    Phone Number Patient can be reached at: Home number on file [phone] (home)    Best Time: anytime    Can we leave a detailed message on this number? YES    Call taken on 4/8/2022 at 3:37 PM by Gil Healy

## 2022-04-08 NOTE — TELEPHONE ENCOUNTER
Sorry for the inconvenience, I thought that I had ordered both of those in the past but if they are saying they have the colonoscopy but not the EGD order, I will reorder the EGD today.  Someone should contact you from [facility] to get this scheduled

## 2022-04-08 NOTE — PROGRESS NOTES
HEART CARE ENCOUNTER CONSULTATON NOTE      [facility]  [phone]      Assessment/Recommendations   Assessment/Plan:  43 yo female with a history of hypertension,hyperlipidemia and family history of premature heart disease with symptoms of dyspnea on exertion.Dyspnea on exertion likely multifactorial secondary to anemia and conditioning.Given risk factors and symptoms of exertional dyspnea we discussed ct coronary angiogram and cardiac echo.Pending the results further comments regarding recommendations and risk modification.    2.Hypertension.Appears controlled on the current dosing of Lisinopril.  3.Dyslipidemia.Diet and lifestyle changes.Pending the results of the CT angiogram further recommendations pending.  4.Iron deficiency anemia.As per her primary and OB GYN providers.      Plan CT angiogram.Risks, benefits and alternatives discussed.She is aware of the contrast and xray exposure.  Echocardiogram to further define cardiac causes of dyspnea and LVH with hypertension       History of Present Illness/Subjective    42-year-old female who is seen as a consultationper her primary care physician secondary to significant for history of heart disease. She has a history of hypertension and hyperlipidemia.Blood pressure has been under reasonable control with Lisinopril.She has a history of palpitations which has been less frequent as of late.She does endorse shortness of breath with climbing stairs but does not experience chest discomfort.She is noted to be significantly anemic.She has discussed with her OB-GYN physician heavy menstrual cycles and possible uterine ablation.She has a very active life with her children and work life.    Cardiac risk factors are negative for tobacco or significant alcohol use.Posirive for hypertension,hyperlipidemia and significant family history of heart disease.Negative for significant gestational diabetes and negative for preeclampsia.    Her father had his  first MI and 45 with subsequent heart failure,atrial fibrillation and ICD implantation.Paternal grandfather  at age 49 of a heart attack.    ECG today Sinus rhythm,normal ECG     Physical Examination  Review of Systems   Vitals: LMP 2022 (Exact Date)   128/74,80-90,245 lb  Wt Readings from Last 3 Encounters:   22 111.9 kg (246 lb 9.6 oz)   21 110.2 kg (242 lb 14.4 oz)   10/14/20 107.5 kg (237 lb)       General Appearance:   no distress, normal body habitus   ENT/Mouth: membranes moist, no oral lesions or bleeding gums.      EYES:  no scleral icterus, normal conjunctivae   Neck: no carotid bruits or thyromegaly   Chest/Lungs:   lungs are clear to auscultation, no rales or wheezing,  equal chest wall expansion    Cardiovascular:   Regular. Normal first and second heart sounds with no murmurs, rubs, or gallops; the carotid, radial and posterior tibial pulses are intact, Jugular venous pressure within normal limits, no edema bilaterally    Abdomen:  no organomegaly, masses, bruits, or tenderness; bowel sounds are present   Extremities: no cyanosis or clubbing   Skin: no xanthelasma, warm.    Neurologic: , no tremors     Psychiatric: alert and oriented x3, calm        Please refer above for cardiac ROS details.        Medical History  Surgical History Family History Social History   Past Medical History:   Diagnosis Date     Chronic pain     upper back  pain     CTS (carpal tunnel syndrome)     bilateral     Dislocation of right patella 2022    seeing [place], also noted osteonecrosis of that knee     Hyperlipidemia      Miscarriage     x2, one D&C, one medication, no complications     Neck pain 10/01/2020    herniated disc C6-C7, treating at [place], had injection, not real helpful     Normal delivery     x4     Palpitations      Shoulder joint pain     right- past hx.     Vitamin D deficiency      Past Surgical History:   Procedure Laterality Date     ADRENALECTOMY Right      CORTISOL-SECRETING ADENOMA, S/P HEMORRHAGE.      SECTION  2007    first child     DILATION AND CURETTAGE  2006     HC REVISE MEDIAN N/CARPAL TUNNEL SURG Right 2018    Procedure: RIGHT CARPAL TUNNEL;  Surgeon: Do Hennessy MD;  Location: [facility] OR;  Service: Neurosurgery     Family History   Problem Relation Age of Onset     Recurrent abdominal pain Mother         removed a bunch of intestine      Hypertension Father      Heart Disease Father 45        MI, a fib, cardiac ablation, ICD     Heart Disease Paternal Grandfather          at age 49 of MI     Thyroid Cancer No family hx of         Social History     Socioeconomic History     Marital status:      Spouse name: Macario     Number of children: 5     Years of education: Not on file     Highest education level: Not on file   Occupational History     Occupation: RN labor and delivery     Comment: [employer]   Tobacco Use     Smoking status: Never Smoker     Smokeless tobacco: Never Used     Tobacco comment: never smoked   Vaping Use     Vaping Use: Never used   Substance and Sexual Activity     Alcohol use: Yes     Alcohol/week: 5.0 standard drinks     Types: 5 Standard drinks or equivalent per week     Comment: Alcoholic Drinks/day: occas.     Drug use: No     Sexual activity: Yes     Partners: Male     Birth control/protection: Surgical     Comment: vasectomy   Other Topics Concern     Parent/sibling w/ CABG, MI or angioplasty before 65F 55M? Not Asked   Social History Narrative     Not on file     Social Determinants of Health     Financial Resource Strain: Not on file   Food Insecurity: Not on file   Transportation Needs: Not on file   Physical Activity: Not on file   Stress: Not on file   Social Connections: Not on file   Intimate Partner Violence: Not on file   Housing Stability: Not on file           Medications  Allergies   Current Outpatient Medications   Medication Sig Dispense Refill     Bacillus Coagulans-Inulin  (PROBIOTIC) 1-250 BILLION-MG CAPS        FLUoxetine (PROZAC) 10 MG capsule Take 1 capsule (10 mg) by mouth daily 60 capsule 0     lisinopril (ZESTRIL) 10 MG tablet TAKE 1 TABLET (10 MG) BY MOUTH DAILY. 90 tablet 0       Allergies   Allergen Reactions     Morphine Hives          Lab Results    Chemistry/lipid CBC Cardiac Enzymes/BNP/TSH/INR   Recent Labs   Lab Test 03/23/22  1008   CHOL 193   HDL 62      TRIG 48     Recent Labs   Lab Test 03/23/22  1008 10/14/20  0916 01/16/19  0934    129 111     Recent Labs   Lab Test 03/23/22  1008      POTASSIUM 4.5   CHLORIDE 107   CO2 21*      BUN 7*   CR 0.73   GFRESTIMATED >90   ROXANN 9.5     Recent Labs   Lab Test 03/23/22  1008 10/14/20  0916 10/03/19  1116   CR 0.73 0.72 0.73     Recent Labs   Lab Test 01/16/19  0934 03/30/17  0815   A1C 5.1 5.4          Recent Labs   Lab Test 04/06/22  1057   WBC 8.0   HGB 8.9*   HCT 31.2*   MCV 75*   *     Recent Labs   Lab Test 04/06/22  1057 03/30/22  1250 03/23/22  1008   HGB 8.9* 8.7* 9.2*    No results for input(s): TROPONINI in the last 55484 hours.  No results for input(s): BNP, NTBNPI, NTBNP in the last 58467 hours.  Recent Labs   Lab Test 03/23/22  1008   TSH 1.56     No results for input(s): INR in the last 20761 hours.     Elias Oneil MD

## 2022-04-14 ENCOUNTER — HOSPITAL ENCOUNTER (OUTPATIENT)
Dept: CARDIOLOGY | Facility: CLINIC | Age: 42
Discharge: HOME | End: 2022-04-14
Attending: INTERNAL MEDICINE | Admitting: INTERNAL MEDICINE
Payer: COMMERCIAL

## 2022-04-14 DIAGNOSIS — R06.09 DYSPNEA ON EXERTION: ICD-10-CM

## 2022-04-14 DIAGNOSIS — I10 ESSENTIAL HYPERTENSION: ICD-10-CM

## 2022-04-14 LAB — LVEF ECHO: NORMAL

## 2022-04-14 PROCEDURE — 93306 TTE W/DOPPLER COMPLETE: CPT | Mod: 26 | Performed by: INTERNAL MEDICINE

## 2022-04-14 PROCEDURE — 999N000208 ECHOCARDIOGRAM COMPLETE

## 2022-04-14 PROCEDURE — 255N000002 HC RX 255 OP 636: Performed by: INTERNAL MEDICINE

## 2022-04-14 RX ADMIN — PERFLUTREN 2 ML: 6.52 INJECTION, SUSPENSION INTRAVENOUS at 13:40

## 2022-04-15 DIAGNOSIS — N95.1 MENOPAUSAL SYMPTOMS: ICD-10-CM

## 2022-04-17 NOTE — TELEPHONE ENCOUNTER
"Routing refill request to provider for review/approval because:  Patient is reporting not taking this medication on 4/8/2022    Last Written Prescription Date:  3/23/2022  Last Fill Quantity: 60,  # refills: 0   Last office visit provider:  3/23/2022     Requested Prescriptions   Pending Prescriptions Disp Refills     FLUoxetine (PROZAC) 10 MG capsule [Pharmacy Med Name: FLUOXETINE HCL 10 MG CAPSULE] 30 capsule 1     Sig: TAKE 1 CAPSULE BY MOUTH EVERY DAY       SSRIs Protocol Passed - 4/15/2022 10:30 AM        Passed - Recent (12 mo) or future (30 days) visit within the authorizing provider's specialty     Patient has had an office visit with the authorizing provider or a provider within the authorizing providers department within the previous 12 mos or has a future within next 30 days. See \"Patient Info\" tab in inbasket, or \"Choose Columns\" in Meds & Orders section of the refill encounter.              Passed - Medication is active on med list        Passed - Patient is age 18 or older        Passed - No active pregnancy on record        Passed - No positive pregnancy test in last 12 months             Pebbles Rachel RN 04/17/22 6:12 PM  "

## 2022-04-18 RX ORDER — FLUOXETINE 10 MG/1
CAPSULE ORAL
Qty: 30 CAPSULE | Refills: 1 | Status: SHIPPED | OUTPATIENT
Start: 2022-04-18 | End: 2022-07-18

## 2022-04-18 NOTE — TELEPHONE ENCOUNTER
Please call patient and let her know that I did refill her Prozac today.  I did give her a 30-day supply with 1 refill.  It looks like I started her on this when she was seen on 3/23/2022 so perhaps she did not pick it up right away and now is deciding to start that.  She is having additional problems or concerns she needs to be seen prior to that but otherwise I should see her in about 60 days for recheck.

## 2022-04-29 ENCOUNTER — TRANSFERRED RECORDS (OUTPATIENT)
Dept: HEALTH INFORMATION MANAGEMENT | Facility: CLINIC | Age: 42
End: 2022-04-29

## 2022-05-02 ENCOUNTER — MYC MEDICAL ADVICE (OUTPATIENT)
Dept: FAMILY MEDICINE | Facility: CLINIC | Age: 42
End: 2022-05-02
Payer: COMMERCIAL

## 2022-05-02 DIAGNOSIS — D64.9 ANEMIA, UNSPECIFIED TYPE: Primary | ICD-10-CM

## 2022-05-02 DIAGNOSIS — N95.1 MENOPAUSAL SYMPTOMS: ICD-10-CM

## 2022-05-05 RX ORDER — FLUOXETINE 10 MG/1
CAPSULE ORAL
Qty: 90 CAPSULE | Refills: 1 | OUTPATIENT
Start: 2022-05-05

## 2022-05-11 ENCOUNTER — LAB (OUTPATIENT)
Dept: LAB | Facility: CLINIC | Age: 42
End: 2022-05-11
Payer: COMMERCIAL

## 2022-05-11 DIAGNOSIS — D64.9 ANEMIA, UNSPECIFIED TYPE: ICD-10-CM

## 2022-05-11 LAB
ERYTHROCYTE [DISTWIDTH] IN BLOOD BY AUTOMATED COUNT: 20.5 % (ref 10–15)
HCT VFR BLD AUTO: 35.9 % (ref 35–47)
HGB BLD-MCNC: 10.3 G/DL (ref 11.7–15.7)
MCH RBC QN AUTO: 22.5 PG (ref 26.5–33)
MCHC RBC AUTO-ENTMCNC: 28.7 G/DL (ref 31.5–36.5)
MCV RBC AUTO: 79 FL (ref 78–100)
PLATELET # BLD AUTO: 337 10E3/UL (ref 150–450)
RBC # BLD AUTO: 4.57 10E6/UL (ref 3.8–5.2)
WBC # BLD AUTO: 5.9 10E3/UL (ref 4–11)

## 2022-05-11 PROCEDURE — 36415 COLL VENOUS BLD VENIPUNCTURE: CPT

## 2022-05-11 PROCEDURE — 85027 COMPLETE CBC AUTOMATED: CPT

## 2022-05-19 ENCOUNTER — HOSPITAL ENCOUNTER (OUTPATIENT)
Dept: CT IMAGING | Facility: CLINIC | Age: 42
Discharge: HOME | End: 2022-05-19
Attending: INTERNAL MEDICINE | Admitting: INTERNAL MEDICINE
Payer: COMMERCIAL

## 2022-05-19 VITALS — SYSTOLIC BLOOD PRESSURE: 116 MMHG | DIASTOLIC BLOOD PRESSURE: 72 MMHG

## 2022-05-19 DIAGNOSIS — R06.09 DYSPNEA ON EXERTION: ICD-10-CM

## 2022-05-19 LAB
BSA FOR ECHO PROCEDURE: 2.13 M2
CCTA ASCENDING AORTA: 3
CCTA SINUS: 3.1
CREAT BLD-MCNC: 0.6 MG/DL (ref 0.6–1.1)
GFR SERPL CREATININE-BSD FRML MDRD: >60 ML/MIN/1.73M2

## 2022-05-19 PROCEDURE — 75574 CT ANGIO HRT W/3D IMAGE: CPT | Mod: 26 | Performed by: GENERAL ACUTE CARE HOSPITAL

## 2022-05-19 PROCEDURE — 250N000013 HC RX MED GY IP 250 OP 250 PS 637: Performed by: INTERNAL MEDICINE

## 2022-05-19 PROCEDURE — 82565 ASSAY OF CREATININE: CPT

## 2022-05-19 PROCEDURE — 250N000009 HC RX 250: Performed by: INTERNAL MEDICINE

## 2022-05-19 PROCEDURE — 75574 CT ANGIO HRT W/3D IMAGE: CPT

## 2022-05-19 PROCEDURE — 250N000011 HC RX IP 250 OP 636: Performed by: INTERNAL MEDICINE

## 2022-05-19 RX ORDER — METOPROLOL TARTRATE 1 MG/ML
5 INJECTION, SOLUTION INTRAVENOUS
Status: DISCONTINUED | OUTPATIENT
Start: 2022-05-19 | End: 2022-05-20 | Stop reason: HOSPADM

## 2022-05-19 RX ORDER — IOPAMIDOL 755 MG/ML
100 INJECTION, SOLUTION INTRAVASCULAR ONCE
Status: COMPLETED | OUTPATIENT
Start: 2022-05-19 | End: 2022-05-19

## 2022-05-19 RX ORDER — DILTIAZEM HYDROCHLORIDE 5 MG/ML
10 INJECTION INTRAVENOUS
Status: DISCONTINUED | OUTPATIENT
Start: 2022-05-19 | End: 2022-05-20 | Stop reason: HOSPADM

## 2022-05-19 RX ORDER — NITROGLYCERIN 0.4 MG/1
0.4 TABLET SUBLINGUAL ONCE
Status: COMPLETED | OUTPATIENT
Start: 2022-05-19 | End: 2022-05-19

## 2022-05-19 RX ORDER — DILTIAZEM HYDROCHLORIDE 5 MG/ML
5 INJECTION INTRAVENOUS
Status: DISCONTINUED | OUTPATIENT
Start: 2022-05-19 | End: 2022-05-20 | Stop reason: HOSPADM

## 2022-05-19 RX ADMIN — NITROGLYCERIN 0.4 MG: 0.4 TABLET SUBLINGUAL at 09:58

## 2022-05-19 RX ADMIN — IOPAMIDOL 100 ML: 755 INJECTION, SOLUTION INTRAVENOUS at 10:20

## 2022-05-19 RX ADMIN — METOPROLOL TARTRATE 5 MG: 1 INJECTION, SOLUTION INTRAVENOUS at 09:58

## 2022-05-19 RX ADMIN — METOPROLOL TARTRATE 5 MG: 1 INJECTION, SOLUTION INTRAVENOUS at 10:03

## 2022-07-11 DIAGNOSIS — I10 ESSENTIAL HYPERTENSION: ICD-10-CM

## 2022-07-11 RX ORDER — LISINOPRIL 10 MG/1
10 TABLET ORAL DAILY
Qty: 90 TABLET | Refills: 2 | Status: SHIPPED | OUTPATIENT
Start: 2022-07-11 | End: 2023-09-27

## 2022-07-11 NOTE — TELEPHONE ENCOUNTER
"Last Written Prescription Date:  3/2/22  Last Fill Quantity: 90,  # refills: 0   Last office visit provider:  3/23/22     Requested Prescriptions   Pending Prescriptions Disp Refills     lisinopril (ZESTRIL) 10 MG tablet [Pharmacy Med Name: LISINOPRIL 10 MG TABLET] 90 tablet 0     Sig: TAKE 1 TABLET (10 MG) BY MOUTH DAILY.       ACE Inhibitors (Including Combos) Protocol Passed - 7/11/2022 12:33 AM        Passed - Blood pressure under 140/90 in past 12 months     BP Readings from Last 3 Encounters:   05/19/22 116/72   04/08/22 128/74   03/23/22 122/80                 Passed - Recent (12 mo) or future (30 days) visit within the authorizing provider's specialty     Patient has had an office visit with the authorizing provider or a provider within the authorizing providers department within the previous 12 mos or has a future within next 30 days. See \"Patient Info\" tab in inbasket, or \"Choose Columns\" in Meds & Orders section of the refill encounter.              Passed - Medication is active on med list        Passed - Patient is age 18 or older        Passed - No active pregnancy on record        Passed - Normal serum creatinine on file in past 12 months     Recent Labs   Lab Test 05/19/22  0955   CR 0.6       Ok to refill medication if creatinine is low          Passed - Normal serum potassium on file in past 12 months     Recent Labs   Lab Test 03/23/22  1008   POTASSIUM 4.5             Passed - No positive pregnancy test within past 12 months             Rosa Ortiz RN 07/11/22 6:22 PM  " Pt arrives to ED with complaints of overdose.  Pt reports taking approx 30 tablets of Lyrica between 2100 and 2130.  Pt denies SI, reports he was feeling more depressed lately and felt anxious throughout day.  Pt awake and alert, answering questions appropriately, interacting with RN appropriately

## 2022-07-18 DIAGNOSIS — N95.1 MENOPAUSAL SYMPTOMS: ICD-10-CM

## 2022-07-18 RX ORDER — FLUOXETINE 10 MG/1
CAPSULE ORAL
Qty: 30 CAPSULE | Refills: 8 | Status: SHIPPED | OUTPATIENT
Start: 2022-07-18 | End: 2023-09-22

## 2022-07-18 NOTE — TELEPHONE ENCOUNTER
"Last Written Prescription Date:  4/18/22   Last Fill Quantity: 30,  # refills: 1   Last office visit provider:  3/23/22     Requested Prescriptions   Pending Prescriptions Disp Refills     FLUoxetine (PROZAC) 10 MG capsule [Pharmacy Med Name: FLUOXETINE HCL 10 MG CAPSULE] 30 capsule 1     Sig: TAKE 1 CAPSULE BY MOUTH EVERY DAY       SSRIs Protocol Passed - 7/18/2022  1:06 PM        Passed - Recent (12 mo) or future (30 days) visit within the authorizing provider's specialty     Patient has had an office visit with the authorizing provider or a provider within the authorizing providers department within the previous 12 mos or has a future within next 30 days. See \"Patient Info\" tab in inbasket, or \"Choose Columns\" in Meds & Orders section of the refill encounter.              Passed - Medication is active on med list        Passed - Patient is age 18 or older        Passed - No active pregnancy on record        Passed - No positive pregnancy test in last 12 months             Jenny Urena RN 07/18/22 1:47 PM  "

## 2022-09-20 ENCOUNTER — MYC MEDICAL ADVICE (OUTPATIENT)
Dept: FAMILY MEDICINE | Facility: CLINIC | Age: 42
End: 2022-09-20

## 2022-09-20 DIAGNOSIS — D64.9 ANEMIA, UNSPECIFIED TYPE: Primary | ICD-10-CM

## 2022-09-22 ENCOUNTER — LAB (OUTPATIENT)
Dept: LAB | Facility: CLINIC | Age: 42
End: 2022-09-22
Payer: COMMERCIAL

## 2022-09-22 DIAGNOSIS — D64.9 ANEMIA, UNSPECIFIED TYPE: ICD-10-CM

## 2022-09-22 LAB
ERYTHROCYTE [DISTWIDTH] IN BLOOD BY AUTOMATED COUNT: 15.3 % (ref 10–15)
FERRITIN SERPL-MCNC: 33 NG/ML (ref 6–175)
HCT VFR BLD AUTO: 40.5 % (ref 35–47)
HGB BLD-MCNC: 13 G/DL (ref 11.7–15.7)
IRON BINDING CAPACITY (ROCHE): 370 UG/DL (ref 240–430)
IRON SATN MFR SERPL: 38 % (ref 15–46)
IRON SERPL-MCNC: 140 UG/DL (ref 37–145)
MCH RBC QN AUTO: 28.5 PG (ref 26.5–33)
MCHC RBC AUTO-ENTMCNC: 32.1 G/DL (ref 31.5–36.5)
MCV RBC AUTO: 89 FL (ref 78–100)
PLATELET # BLD AUTO: 285 10E3/UL (ref 150–450)
RBC # BLD AUTO: 4.56 10E6/UL (ref 3.8–5.2)
WBC # BLD AUTO: 10.3 10E3/UL (ref 4–11)

## 2022-09-22 PROCEDURE — 85027 COMPLETE CBC AUTOMATED: CPT

## 2022-09-22 PROCEDURE — 82728 ASSAY OF FERRITIN: CPT

## 2022-09-22 PROCEDURE — 83550 IRON BINDING TEST: CPT

## 2022-09-22 PROCEDURE — 36415 COLL VENOUS BLD VENIPUNCTURE: CPT

## 2022-12-05 ENCOUNTER — MYC MEDICAL ADVICE (OUTPATIENT)
Dept: CARDIOLOGY | Facility: CLINIC | Age: 42
End: 2022-12-05

## 2022-12-05 DIAGNOSIS — E66.01 CLASS 3 SEVERE OBESITY DUE TO EXCESS CALORIES WITH SERIOUS COMORBIDITY AND BODY MASS INDEX (BMI) OF 40.0 TO 44.9 IN ADULT (H): Primary | ICD-10-CM

## 2022-12-05 DIAGNOSIS — Z82.49 FAMILY HISTORY OF ISCHEMIC HEART DISEASE: ICD-10-CM

## 2022-12-05 DIAGNOSIS — E55.9 VITAMIN D DEFICIENCY DISEASE: ICD-10-CM

## 2022-12-05 DIAGNOSIS — E66.813 CLASS 3 SEVERE OBESITY DUE TO EXCESS CALORIES WITH SERIOUS COMORBIDITY AND BODY MASS INDEX (BMI) OF 40.0 TO 44.9 IN ADULT (H): Primary | ICD-10-CM

## 2022-12-05 DIAGNOSIS — E78.5 HYPERLIPIDEMIA: Chronic | ICD-10-CM

## 2022-12-12 ENCOUNTER — ANCILLARY PROCEDURE (OUTPATIENT)
Dept: MAMMOGRAPHY | Facility: CLINIC | Age: 42
End: 2022-12-12
Attending: FAMILY MEDICINE
Payer: COMMERCIAL

## 2022-12-12 DIAGNOSIS — Z12.31 VISIT FOR SCREENING MAMMOGRAM: ICD-10-CM

## 2022-12-12 PROCEDURE — 77067 SCR MAMMO BI INCL CAD: CPT

## 2023-04-20 ENCOUNTER — PATIENT OUTREACH (OUTPATIENT)
Dept: CARE COORDINATION | Facility: CLINIC | Age: 43
End: 2023-04-20
Payer: COMMERCIAL

## 2023-06-02 ENCOUNTER — HEALTH MAINTENANCE LETTER (OUTPATIENT)
Age: 43
End: 2023-06-02

## 2023-07-11 ASSESSMENT — ENCOUNTER SYMPTOMS
MYALGIAS: 1
HEADACHES: 0
TINGLING: 1
ARTHRALGIAS: 1
CONSTIPATION: 0
BLOOD IN STOOL: 0
PALPITATIONS: 1
STIFFNESS: 1
NAUSEA: 0
SPEECH CHANGE: 0
COUGH DISTURBING SLEEP: 0
SLEEP DISTURBANCES DUE TO BREATHING: 0
HEMOPTYSIS: 0
TREMORS: 0
HYPERTENSION: 0
JAUNDICE: 0
SNORES LOUDLY: 0
MUSCLE WEAKNESS: 1
LIGHT-HEADEDNESS: 0
SPUTUM PRODUCTION: 1
SHORTNESS OF BREATH: 0
MEMORY LOSS: 0
SEIZURES: 0
BOWEL INCONTINENCE: 0
DIARRHEA: 1
RECTAL PAIN: 0
NECK PAIN: 1
POSTURAL DYSPNEA: 0
MUSCLE CRAMPS: 1
ORTHOPNEA: 0
DIZZINESS: 0
JOINT SWELLING: 0
COUGH: 1
EXERCISE INTOLERANCE: 1
BLOATING: 1
PARALYSIS: 0
VOMITING: 0
HYPOTENSION: 0
ABDOMINAL PAIN: 0
BACK PAIN: 1
WEAKNESS: 1
LOSS OF CONSCIOUSNESS: 0
DYSPNEA ON EXERTION: 0
NUMBNESS: 1
DISTURBANCES IN COORDINATION: 0
LEG PAIN: 0
SYNCOPE: 0
WHEEZING: 0
HEARTBURN: 0

## 2023-07-12 ENCOUNTER — OFFICE VISIT (OUTPATIENT)
Dept: ENDOCRINOLOGY | Facility: CLINIC | Age: 43
End: 2023-07-12
Attending: INTERNAL MEDICINE
Payer: COMMERCIAL

## 2023-07-12 VITALS
HEART RATE: 72 BPM | BODY MASS INDEX: 40.98 KG/M2 | SYSTOLIC BLOOD PRESSURE: 126 MMHG | WEIGHT: 246 LBS | HEIGHT: 65 IN | DIASTOLIC BLOOD PRESSURE: 88 MMHG

## 2023-07-12 DIAGNOSIS — E66.813 CLASS 3 SEVERE OBESITY DUE TO EXCESS CALORIES WITH SERIOUS COMORBIDITY AND BODY MASS INDEX (BMI) OF 40.0 TO 44.9 IN ADULT (H): ICD-10-CM

## 2023-07-12 DIAGNOSIS — Z86.018 HISTORY OF ADENOMA OF ADRENAL GLAND: Primary | ICD-10-CM

## 2023-07-12 DIAGNOSIS — Z82.49 FAMILY HISTORY OF ISCHEMIC HEART DISEASE: ICD-10-CM

## 2023-07-12 DIAGNOSIS — E66.01 CLASS 3 SEVERE OBESITY DUE TO EXCESS CALORIES WITH SERIOUS COMORBIDITY AND BODY MASS INDEX (BMI) OF 40.0 TO 44.9 IN ADULT (H): ICD-10-CM

## 2023-07-12 DIAGNOSIS — E55.9 VITAMIN D DEFICIENCY DISEASE: ICD-10-CM

## 2023-07-12 PROCEDURE — 99205 OFFICE O/P NEW HI 60 MIN: CPT | Performed by: INTERNAL MEDICINE

## 2023-07-12 NOTE — LETTER
7/12/2023         RE: Lia Lemon  [address] [facility] [address]        Dear Colleague,    Thank you for referring your patient, Lia Lemon, to the [facility]. Please see a copy of my visit note below.      ENDOCRINOLOGY NEW PATIENT VISIT        HISTORY OF PRESENT ILLNESS    Lia Lemon is seen in consultation at the request of Dr. Oneil for history of adrenal adenoma with concern for Cushing's syndrome.    The patient was previously followed in the [facility] system: I obtained electronic consent from patient to review available records in the [facility] system.    She recalls that while pregnant in 1/2003, she developed acute onset of abdominal pain.  She was seen in the ED and noted to have leukocytosis and was treated for UTI.  However, she had persistent abdominal pain and returned to the ED, where ultrasound of the abdomen revealed a right adrenal mass.    Prior to discovery of adrenal mass, the patient had intentionally lost weight (weight was around 170 pounds).  She had no history of diabetes or hypertension.  She had been on oral contraceptives about 6 months prior and had just discovered pregnancy.  Prior to that, menstrual cycles were occurring regularly.    Review of records in care everywhere indicates that original abdominal ultrasound on 1/29/2003 showed a 10 cm heterogenous mass in the region of the right adrenal gland.  Since patient was pregnant, MRI of the abdomen was performed on 1/30/2023.  MR showed large right adrenal mass with bilobed appearance and septation running through it as well as blood products compatible with hemorrhage: Imaging was suspicious for a pre-existing adrenal mass with hemorrhage.  Mass measured 10.4 x 7.3 x 7.3 cm.  There also appeared to be a 1 x 1.2 cm left adrenal mass with a nonspecific appearance.    Unfortunately, the patient suffered a miscarriage soon after diagnosis of adrenal mass.    Hormonal  evaluation coordinated in primary care showed the following results (the patient does not recall seeing endocrinology):  -2/24/2003: 24 urine free cortisol 82.6 mcg per 24 hours; 24-hour urine VMA, metanephrines and catecholamines normal range  -3/5/2003 (9 AM): Cortisol 23, ACTH 19  -3/6/2003 (8:21 AM, presumably after dexamethasone suppression): Cortisol 3    The patient underwent right adrenalectomy on 3/20/2003: Surgical pathology showed an organizing hematoma, focal microcalcifications and no malignancy.  The specimen was 11 x 8.5 x 7 cm within which there was 6.7 x 4.5 x 0.5 cm attached apparent adrenal gland.    CT of the abdomen performed on 10/15/2003 showed that right adrenal gland had been removed, with postsurgical changes.  There was no evidence for a left adrenal mass: There was suspicion the previous MR appearance of the left adrenal may have been representative of normal adrenal tissue.    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  Otherwise, no profound nausea, diminished appetite or malaise.    She has continued to have difficulties with maintaining weight loss: Now at 245 pounds and has been at this weight for approximately 3 years.  Had successful weight loss with phentermine in the past, with regain of weight.  Saxenda caused nausea.  She is interested in ruling out endocrine causes of weight gain (specifically Cushing syndrome) and addressing weight management also.    She is G7, P5.   is postvasectomy.  Menstrual cycles occurred regularly every month but were quite heavy: Therefore, Mirena IUD was placed.  No menstrual cycle since IUD placement.    No significant hirsutism.    Some hypopigmented striae on abdomen.  No easy bruising.  History of wrist fracture in childhood, no other fracture history.    History of hypertension, treated with lisinopril.    History of hyperlipidemia.    No history of diabetes.  Had GDM  "during her pregnancy with her first son.    Pertinent Social History: , has 5 sons.  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    PAST MEDICAL HISTORY  Past Medical History:   Diagnosis Date     Chronic pain     upper back  pain     CTS (carpal tunnel syndrome)     bilateral     Dislocation of right patella 01/2022    seeing [facility], also noted osteonecrosis of that knee     Hyperlipidemia      Miscarriage     x2, one D&C, one medication, no complications     Neck pain 10/01/2020    herniated disc C6-C7, treating at [facility], had injection, not real helpful     Normal delivery     x4     Palpitations      Shoulder joint pain     right- past hx.     Vitamin D deficiency        MEDICATIONS  Current Outpatient Medications   Medication Sig Dispense Refill     Bacillus Coagulans-Inulin (PROBIOTIC) 1-250 BILLION-MG CAPS        FLUoxetine (PROZAC) 10 MG capsule TAKE 1 CAPSULE BY MOUTH EVERY DAY 30 capsule 8     levonorgestrel (MIRENA, 52 MG,) 20 MCG/24HR IUD Mirena 20 mcg/24 hours (7 yrs) 52 mg intrauterine device   Take 1 device by intrauterine route.       lisinopril (ZESTRIL) 10 MG tablet TAKE 1 TABLET (10 MG) BY MOUTH DAILY. 90 tablet 2     omeprazole (PRILOSEC OTC) 20 MG EC tablet Take 20 mg by mouth daily         Allergies, family, and social history were reviewed and documented as needed in EHR.     REVIEW OF SYSTEMS  A complete 10-point ROS was performed and pertinent positives and negatives are noted in the HPI. ROS is also positive for the following: Some blurry vision, attributes to work in front of screen.  Has also noted more palpitations.    PHYSICAL EXAM  BP (!) 144/86 (BP Location: Right arm, Patient Position: Sitting, Cuff Size: Adult Large)   Pulse 72   Ht 1.66 m (5' 5.35\")   Wt 111.6 kg (246 lb)   BMI 40.49 kg/m    Body mass index is 40.49 kg/m .  Constitutional: Vital signs reviewed, as recorded above. Patient is alert, oriented and appears in no acute distress.  Eyes: PER, EOMI, " no stare, lid lag, or retraction; no conjunctival injection.  ENMT: OP clear with moist MM. Lips are without lesions.   Neck: Neck supple, no palpable thyromegaly.  Lymphatic: No cervical or supraclavicular LAD.  Cardiovascular: RRR, normal S1/S2, no audible murmurs, rubs or gallops; No LE edema.  Respiratory: CTAB, without wheezes, crackles or rhonchi; normal chest wall motion and respiratory effort.  MSK: Dorsocervical fat pad.  No clubbing or cyanosis; normal muscle bulk and tone.  Skin: Normal skin color, temperature, turgor and texture, thin hypopigmented striae on abdomen, but no purple striae.  Neurological: Alert and oriented times 3. No tremor.    DATA REVIEW  Each of the following laboratory and/or imaging studies were reviewed.    Pertinent past endocrine studies as outlined in HPI.    ASSESSMENT  1.  History of right adrenal adenoma, with hemorrhage.  Status post right adrenalectomy in 2023.  Likely had cortisol excess given 24 urine study results and dexamethasone suppression test results: ACTH was 19, suggesting adrenal source of cortisol excess.  Now status post adrenalectomy, with persistent concerns regarding weight gain (as discussed below).  Reevaluate cortisol status.  Given question of left adrenal adenoma on prior MRI, would also evaluate appearance of left adrenal gland by CT.    2.  Obesity.  With difficulty maintaining weight loss.  Evaluate for endocrine causes of weight gain as above.  If no coexisting endocrinopathy, patient is interested in addressing weight management.  Previously treated with phentermine, with resultant weight loss, but regained after discontinuation of the medication; was on Saxenda, which caused significant nausea.  Could consider a trial of an alternate GLP-1 receptor agonist (we reviewed benefits and side effects, patient does not have history of pancreatitis nor does she have personal or family history of medullary thyroid carcinoma).  Alternatively, we can refer  to medical weight management clinic for more comprehensive support with weight management also.  We can discuss these options after endocrine work-up detailed above.    3.  Hypertension.  Modestly hypertensive today, continue lisinopril.  Blood pressure has been well controlled in the past.    4.  At risk for diabetes.  Based on history of GDM.  Check follow-up hemoglobin A1c (A1c in 4/2022 was normal).    5.  Palpitations.  Check thyroid function tests, BMP and magnesium.  Follow-up with PCP and/or cardiology if initial studies are normal and symptoms persist.    PLAN  -8 AM labs in the coming 1-2 weeks  -Perform 24 hour urine studies  -Schedule adrenal protocol CT  -We will determine next best steps with regards to work up based on results  -Return for a follow-up visit earliest available  -We will communicate results via [employer], or if needed by phone      Orders Placed This Encounter   Procedures     CT Abdomen w/o & w Contrast     Adrenal corticotropin     Cortisol     Basic metabolic panel     Magnesium     Creatinine timed urine     Cortisol free urine     TSH with free T4 reflex     DHEA sulfate     Hemoglobin A1c         I spent a total of 65 minutes on the date of encounter reviewing medical records, evaluating the patient, coordinating care and documenting in the EHR, as detailed above.      Berry Garcia MD   Division of Diabetes, Endocrinology and Metabolism  Department of Medicine      cc: Elias Oneil MD; Alicia Kenney MD        Again, thank you for allowing me to participate in the care of your patient.        Sincerely,        BERRY Garcia MD

## 2023-07-12 NOTE — PATIENT INSTRUCTIONS
-8 AM labs in the coming 1-2 weeks  -Perform 24 hour urine studies  -Schedule adrenal protocol CT  -We will determine next best steps with regards to work up based on results  -Return for a follow-up visit earliest available  -We will communicate results via [employer], or if needed by phone

## 2023-07-12 NOTE — PROGRESS NOTES
ENDOCRINOLOGY NEW PATIENT VISIT        HISTORY OF PRESENT ILLNESS    Lia Lemon is seen in consultation at the request of Dr. Oneil for history of adrenal adenoma with concern for Cushing's syndrome.    The patient was previously followed in the [facility] system: I obtained electronic consent from patient to review available records in the [facility] system.    She recalls that while pregnant in 1/2003, she developed acute onset of abdominal pain.  She was seen in the ED and noted to have leukocytosis and was treated for UTI.  However, she had persistent abdominal pain and returned to the ED, where ultrasound of the abdomen revealed a right adrenal mass.    Prior to discovery of adrenal mass, the patient had intentionally lost weight (weight was around 170 pounds).  She had no history of diabetes or hypertension.  She had been on oral contraceptives about 6 months prior and had just discovered pregnancy.  Prior to that, menstrual cycles were occurring regularly.    Review of records in care everywhere indicates that original abdominal ultrasound on 1/29/2003 showed a 10 cm heterogenous mass in the region of the right adrenal gland.  Since patient was pregnant, MRI of the abdomen was performed on 1/30/2023.  MR showed large right adrenal mass with bilobed appearance and septation running through it as well as blood products compatible with hemorrhage: Imaging was suspicious for a pre-existing adrenal mass with hemorrhage.  Mass measured 10.4 x 7.3 x 7.3 cm.  There also appeared to be a 1 x 1.2 cm left adrenal mass with a nonspecific appearance.    Unfortunately, the patient suffered a miscarriage soon after diagnosis of adrenal mass.    Hormonal evaluation coordinated in primary care showed the following results (the patient does not recall seeing endocrinology):  -2/24/2003: 24 urine free cortisol 82.6 mcg per 24 hours; 24-hour urine VMA, metanephrines and catecholamines normal range  -3/5/2003 (9  AM): Cortisol 23, ACTH 19  -3/6/2003 (8:21 AM, presumably after dexamethasone suppression): Cortisol 3    The patient underwent right adrenalectomy on 3/20/2003: Surgical pathology showed an organizing hematoma, focal microcalcifications and no malignancy.  The specimen was 11 x 8.5 x 7 cm within which there was 6.7 x 4.5 x 0.5 cm attached apparent adrenal gland.    CT of the abdomen performed on 10/15/2003 showed that right adrenal gland had been removed, with postsurgical changes.  There was no evidence for a left adrenal mass: There was suspicion the previous MR appearance of the left adrenal may have been representative of normal adrenal tissue.    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  Otherwise, no profound nausea, diminished appetite or malaise.    She has continued to have difficulties with maintaining weight loss: Now at 245 pounds and has been at this weight for approximately 3 years.  Had successful weight loss with phentermine in the past, with regain of weight.  Saxenda caused nausea.  She is interested in ruling out endocrine causes of weight gain (specifically Cushing syndrome) and addressing weight management also.    She is G7, P5.   is postvasectomy.  Menstrual cycles occurred regularly every month but were quite heavy: Therefore, Mirena IUD was placed.  No menstrual cycle since IUD placement.    No significant hirsutism.    Some hypopigmented striae on abdomen.  No easy bruising.  History of wrist fracture in childhood, no other fracture history.    History of hypertension, treated with lisinopril.    History of hyperlipidemia.    No history of diabetes.  Had GDM during her pregnancy with her first son.    Pertinent Social History: , has 5 sons.  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    PAST MEDICAL HISTORY  Past Medical History:   Diagnosis Date     Chronic pain     upper back  pain     CTS  "(carpal tunnel syndrome)     bilateral     Dislocation of right patella 01/2022    seeing [facility], also noted osteonecrosis of that knee     Hyperlipidemia      Miscarriage     x2, one D&C, one medication, no complications     Neck pain 10/01/2020    herniated disc C6-C7, treating at [facility], had injection, not real helpful     Normal delivery     x4     Palpitations      Shoulder joint pain     right- past hx.     Vitamin D deficiency        MEDICATIONS  Current Outpatient Medications   Medication Sig Dispense Refill     Bacillus Coagulans-Inulin (PROBIOTIC) 1-250 BILLION-MG CAPS        FLUoxetine (PROZAC) 10 MG capsule TAKE 1 CAPSULE BY MOUTH EVERY DAY 30 capsule 8     levonorgestrel (MIRENA, 52 MG,) 20 MCG/24HR IUD Mirena 20 mcg/24 hours (7 yrs) 52 mg intrauterine device   Take 1 device by intrauterine route.       lisinopril (ZESTRIL) 10 MG tablet TAKE 1 TABLET (10 MG) BY MOUTH DAILY. 90 tablet 2     omeprazole (PRILOSEC OTC) 20 MG EC tablet Take 20 mg by mouth daily         Allergies, family, and social history were reviewed and documented as needed in EHR.     REVIEW OF SYSTEMS  A complete 10-point ROS was performed and pertinent positives and negatives are noted in the HPI. ROS is also positive for the following: Some blurry vision, attributes to work in front of screen.  Has also noted more palpitations.    PHYSICAL EXAM  BP (!) 144/86 (BP Location: Right arm, Patient Position: Sitting, Cuff Size: Adult Large)   Pulse 72   Ht 1.66 m (5' 5.35\")   Wt 111.6 kg (246 lb)   BMI 40.49 kg/m    Body mass index is 40.49 kg/m .  Constitutional: Vital signs reviewed, as recorded above. Patient is alert, oriented and appears in no acute distress.  Eyes: PER, EOMI, no stare, lid lag, or retraction; no conjunctival injection.  ENMT: OP clear with moist MM. Lips are without lesions.   Neck: Neck supple, no palpable thyromegaly.  Lymphatic: No cervical or supraclavicular LAD.  Cardiovascular: RRR, normal S1/S2, no audible " murmurs, rubs or gallops; No LE edema.  Respiratory: CTAB, without wheezes, crackles or rhonchi; normal chest wall motion and respiratory effort.  MSK: Dorsocervical fat pad.  No clubbing or cyanosis; normal muscle bulk and tone.  Skin: Normal skin color, temperature, turgor and texture, thin hypopigmented striae on abdomen, but no purple striae.  Neurological: Alert and oriented times 3. No tremor.    DATA REVIEW  Each of the following laboratory and/or imaging studies were reviewed.    Pertinent past endocrine studies as outlined in HPI.    ASSESSMENT  1.  History of right adrenal adenoma, with hemorrhage.  Status post right adrenalectomy in 2023.  Likely had cortisol excess given 24 urine study results and dexamethasone suppression test results: ACTH was 19, suggesting adrenal source of cortisol excess.  Now status post adrenalectomy, with persistent concerns regarding weight gain (as discussed below).  Reevaluate cortisol status.  Given question of left adrenal adenoma on prior MRI, would also evaluate appearance of left adrenal gland by CT.    2.  Obesity.  With difficulty maintaining weight loss.  Evaluate for endocrine causes of weight gain as above.  If no coexisting endocrinopathy, patient is interested in addressing weight management.  Previously treated with phentermine, with resultant weight loss, but regained after discontinuation of the medication; was on Saxenda, which caused significant nausea.  Could consider a trial of an alternate GLP-1 receptor agonist (we reviewed benefits and side effects, patient does not have history of pancreatitis nor does she have personal or family history of medullary thyroid carcinoma).  Alternatively, we can refer to medical weight management clinic for more comprehensive support with weight management also.  We can discuss these options after endocrine work-up detailed above.    3.  Hypertension.  Modestly hypertensive today, continue lisinopril.  Blood pressure has  been well controlled in the past.    4.  At risk for diabetes.  Based on history of GDM.  Check follow-up hemoglobin A1c (A1c in 4/2022 was normal).    5.  Palpitations.  Check thyroid function tests, BMP and magnesium.  Follow-up with PCP and/or cardiology if initial studies are normal and symptoms persist.    PLAN  -8 AM labs in the coming 1-2 weeks  -Perform 24 hour urine studies  -Schedule adrenal protocol CT  -We will determine next best steps with regards to work up based on results  -Return for a follow-up visit earliest available  -We will communicate results via [employer], or if needed by phone      Orders Placed This Encounter   Procedures     CT Abdomen w/o & w Contrast     Adrenal corticotropin     Cortisol     Basic metabolic panel     Magnesium     Creatinine timed urine     Cortisol free urine     TSH with free T4 reflex     DHEA sulfate     Hemoglobin A1c         I spent a total of 65 minutes on the date of encounter reviewing medical records, evaluating the patient, coordinating care and documenting in the EHR, as detailed above.      Michael Garcia MD   Division of Diabetes, Endocrinology and Metabolism  Department of Medicine      cc: Elias Oneil MD; Aliica Kenney MD

## 2023-07-13 ENCOUNTER — LAB (OUTPATIENT)
Dept: LAB | Facility: CLINIC | Age: 43
End: 2023-07-13
Payer: COMMERCIAL

## 2023-07-13 DIAGNOSIS — E66.813 CLASS 3 SEVERE OBESITY DUE TO EXCESS CALORIES WITH SERIOUS COMORBIDITY AND BODY MASS INDEX (BMI) OF 40.0 TO 44.9 IN ADULT (H): ICD-10-CM

## 2023-07-13 DIAGNOSIS — Z86.018 HISTORY OF ADENOMA OF ADRENAL GLAND: ICD-10-CM

## 2023-07-13 DIAGNOSIS — E66.01 CLASS 3 SEVERE OBESITY DUE TO EXCESS CALORIES WITH SERIOUS COMORBIDITY AND BODY MASS INDEX (BMI) OF 40.0 TO 44.9 IN ADULT (H): ICD-10-CM

## 2023-07-13 LAB
ANION GAP SERPL CALCULATED.3IONS-SCNC: 11 MMOL/L (ref 7–15)
BUN SERPL-MCNC: 9.2 MG/DL (ref 6–20)
CALCIUM SERPL-MCNC: 9.1 MG/DL (ref 8.6–10)
CHLORIDE SERPL-SCNC: 107 MMOL/L (ref 98–107)
CORTIS SERPL-MCNC: 14.4 UG/DL
CREAT SERPL-MCNC: 0.67 MG/DL (ref 0.51–0.95)
DEPRECATED HCO3 PLAS-SCNC: 20 MMOL/L (ref 22–29)
GFR SERPL CREATININE-BSD FRML MDRD: >90 ML/MIN/1.73M2
GLUCOSE SERPL-MCNC: 103 MG/DL (ref 70–99)
HBA1C MFR BLD: 5.4 % (ref 0–5.6)
MAGNESIUM SERPL-MCNC: 2 MG/DL (ref 1.7–2.3)
POTASSIUM SERPL-SCNC: 4 MMOL/L (ref 3.4–5.3)
SODIUM SERPL-SCNC: 138 MMOL/L (ref 136–145)
TSH SERPL DL<=0.005 MIU/L-ACNC: 2.22 UIU/ML (ref 0.3–4.2)

## 2023-07-13 PROCEDURE — 82627 DEHYDROEPIANDROSTERONE: CPT

## 2023-07-13 PROCEDURE — 83735 ASSAY OF MAGNESIUM: CPT

## 2023-07-13 PROCEDURE — 84443 ASSAY THYROID STIM HORMONE: CPT

## 2023-07-13 PROCEDURE — 82024 ASSAY OF ACTH: CPT

## 2023-07-13 PROCEDURE — 82533 TOTAL CORTISOL: CPT

## 2023-07-13 PROCEDURE — 36415 COLL VENOUS BLD VENIPUNCTURE: CPT

## 2023-07-13 PROCEDURE — 80048 BASIC METABOLIC PNL TOTAL CA: CPT

## 2023-07-13 PROCEDURE — 83036 HEMOGLOBIN GLYCOSYLATED A1C: CPT

## 2023-07-14 LAB
ACTH PLAS-MCNC: 19 PG/ML
DHEA-S SERPL-MCNC: 90 UG/DL (ref 35–430)

## 2023-08-11 ENCOUNTER — HOSPITAL ENCOUNTER (OUTPATIENT)
Dept: CARDIOLOGY | Facility: CLINIC | Age: 43
Discharge: HOME | End: 2023-08-11
Attending: INTERNAL MEDICINE
Payer: COMMERCIAL

## 2023-08-11 DIAGNOSIS — R00.2 PALPITATIONS: Primary | ICD-10-CM

## 2023-08-11 DIAGNOSIS — R00.2 PALPITATIONS: ICD-10-CM

## 2023-08-11 PROCEDURE — 93226 XTRNL ECG REC<48 HR SCAN A/R: CPT

## 2023-08-11 PROCEDURE — 93306 TTE W/DOPPLER COMPLETE: CPT | Mod: 26 | Performed by: INTERNAL MEDICINE

## 2023-08-11 PROCEDURE — 999N000208 ECHOCARDIOGRAM COMPLETE

## 2023-08-11 PROCEDURE — 255N000002 HC RX 255 OP 636: Performed by: INTERNAL MEDICINE

## 2023-08-11 RX ADMIN — PERFLUTREN 2 ML: 6.52 INJECTION, SUSPENSION INTRAVENOUS at 13:40

## 2023-08-11 NOTE — PROGRESS NOTES
Holter ordered  Echo ordered  Follow-up MDG order placed.  Message sent to scheduling.  -ra    ===View-only below this line===  ----- Message -----  From: Elias Oneil MD  Sent: 8/11/2023  11:14 AM CDT  To: ROXANNE Vazquez   Lia is writing about increased palpitations and recent brief monitor suggesting PVCs.  We please arrange a Holter monitor ASAP echocardiogram ASAP and follow-up with me pending the results.  Thank you

## 2023-08-14 PROCEDURE — 93227 XTRNL ECG REC<48 HR R&I: CPT | Performed by: INTERNAL MEDICINE

## 2023-08-15 DIAGNOSIS — I49.3 PVC'S (PREMATURE VENTRICULAR CONTRACTIONS): Primary | ICD-10-CM

## 2023-08-15 DIAGNOSIS — R00.2 PALPITATIONS: ICD-10-CM

## 2023-08-15 DIAGNOSIS — R00.2 PALPITATIONS: Primary | ICD-10-CM

## 2023-08-15 NOTE — RESULT ENCOUNTER NOTE
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 Thanks, Mehdi

## 2023-08-15 NOTE — PROGRESS NOTES
Follow-up EP order placed.  -ra    ===View-only below this line===  ----- Message -----  From: Yoav Espinal MD  Sent: 8/14/2023   8:41 PM CDT  To: Dominique Jain RN; Elias Oneil MD; *    Sure.  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 Thanks, Mehdi

## 2023-08-16 DIAGNOSIS — R00.2 PALPITATIONS: Primary | ICD-10-CM

## 2023-08-16 DIAGNOSIS — I49.3 PVC'S (PREMATURE VENTRICULAR CONTRACTIONS): ICD-10-CM

## 2023-08-16 RX ORDER — METOPROLOL SUCCINATE 25 MG/1
25 TABLET, EXTENDED RELEASE ORAL DAILY
Qty: 90 TABLET | Refills: 1 | Status: SHIPPED | OUTPATIENT
Start: 2023-08-16 | End: 2023-11-09 | Stop reason: DRUGHIGH

## 2023-08-16 NOTE — PROGRESS NOTES
Metoprolol succinate 25 mg daily sent to [facility].  -ra    ===View-only below this line===  ----- Message -----  From: Elias Oneil MD  Sent: 8/15/2023   5:15 PM CDT  To: Dominique Jain RN    Can we please send a prescription for metoprolol XL 25 mg daily.  Thank you ALBERTA Oneil

## 2023-08-31 ENCOUNTER — OFFICE VISIT (OUTPATIENT)
Dept: CARDIOLOGY | Facility: CLINIC | Age: 43
End: 2023-08-31
Payer: COMMERCIAL

## 2023-08-31 VITALS
WEIGHT: 250 LBS | RESPIRATION RATE: 16 BRPM | DIASTOLIC BLOOD PRESSURE: 82 MMHG | BODY MASS INDEX: 41.65 KG/M2 | HEART RATE: 78 BPM | HEIGHT: 65 IN | SYSTOLIC BLOOD PRESSURE: 128 MMHG

## 2023-08-31 DIAGNOSIS — I49.3 PVC'S (PREMATURE VENTRICULAR CONTRACTIONS): Primary | ICD-10-CM

## 2023-08-31 LAB
ATRIAL RATE - MUSE: 78 BPM
ATRIAL RATE - MUSE: 80 BPM
DIASTOLIC BLOOD PRESSURE - MUSE: NORMAL MMHG
DIASTOLIC BLOOD PRESSURE - MUSE: NORMAL MMHG
INTERPRETATION ECG - MUSE: NORMAL
INTERPRETATION ECG - MUSE: NORMAL
P AXIS - MUSE: 10 DEGREES
P AXIS - MUSE: 28 DEGREES
PR INTERVAL - MUSE: 154 MS
PR INTERVAL - MUSE: 170 MS
QRS DURATION - MUSE: 74 MS
QRS DURATION - MUSE: 84 MS
QT - MUSE: 400 MS
QT - MUSE: 412 MS
QTC - MUSE: 456 MS
QTC - MUSE: 475 MS
R AXIS - MUSE: -3 DEGREES
R AXIS - MUSE: 4 DEGREES
SYSTOLIC BLOOD PRESSURE - MUSE: NORMAL MMHG
SYSTOLIC BLOOD PRESSURE - MUSE: NORMAL MMHG
T AXIS - MUSE: 11 DEGREES
T AXIS - MUSE: 6 DEGREES
VENTRICULAR RATE- MUSE: 78 BPM
VENTRICULAR RATE- MUSE: 80 BPM

## 2023-08-31 PROCEDURE — 93000 ELECTROCARDIOGRAM COMPLETE: CPT | Performed by: INTERNAL MEDICINE

## 2023-08-31 PROCEDURE — 99204 OFFICE O/P NEW MOD 45 MIN: CPT | Performed by: INTERNAL MEDICINE

## 2023-08-31 NOTE — PATIENT INSTRUCTIONS
[facility]  Cardiac Electrophysiology  [address]  [place] [address]   Office: [phone]  Fax: [phone]       Thank you for seeing us in clinic today - it is a pleasure to be a part of your care team.  Below is a summary of our plan from today's visit.      PVCs  - would like to continue metoprolol at this time  - will think about an ablation and let us know    Please do not hesitate to be in touch with our office at [phone] with any questions that may arise.      Thank you for trusting us with your care,    Yoav Espinal MD  Clinical Cardiac Electrophysiology  [facility]  [address]  [place] [address]   Office: [phone]  Fax: [phone]

## 2023-08-31 NOTE — PROGRESS NOTES
HEART CARE ENCOUNTER CONSULTATON NOTE      [facility]  [phone]      Assessment/Recommendations   Assessment/Plan:    Lia Lemon is a very pleasant 43 year old female with PMH of PVCs, hypertension, herniated disc  who presents today to the EP clinic.    Premature ventricular contractions - symptomatic with PVCs.  Unifocal of LBIA morphology.  26% burden by Holter ambulatory rhythm monitoring.  No ECG evidence of arrhythmogenic substrate.  Normal LV systolic function, no heart failure symptoms.    We reviewed PVCs and management options including expectant management, medical therapy including antiarrhythmic drug therapy, and PVC mapping and ablation.  We reviewed PVC mapping and ablation procedures, risks (including groin bleeding, stroke, tamponade, heart block) and recovery expectations.  She would prefer to continue metoprolol at this time.  -She will think about her ablation and get back to us.      Time spent: 45 minutes spent on the date of the encounter doing chart review, history and exam, documentation and further activities as noted above.         History of Present Illness/Subjective    HPI: Lia Lemon is a very pleasant 43 year old female with PMH of PVCs, hypertension  who presents today to the EP clinic.    A recent Holter monitor showed a PVC burden of 26%. She has the perception of PVCs with palpitations. She has had them for a few months now. They do affect the quality of her life.  Metoprolol helps to a certain extent with her symptoms.  Triggers have been stress at work and also her neck pain-with more PVCs occurring on days when her neck pain is worse.      Recent ECG(personally reviewed):   NSR with PVC left bundle inferior axis likely outflow tract origin    Recent Echocardiogram Results (personally reviewed):  August 2023  Interpretation Summary     1. Normal left ventricular size and systolic performance with a visually  estimated ejection fraction of  "55-60%.  2. No significant valvular heart disease is identified on this study.  3. Normal right ventricular size and systolic performance.     When compared to the prior real-time echocardiogram dated 14 April 2022, the  findings are felt to be fairly similar on both examinations.      Labs below reviewed personally     Physical Examination  Review of Systems   Vitals: /82 (BP Location: Left arm, Patient Position: Sitting, Cuff Size: Adult Large)   Pulse 78   Resp 16   Ht 1.66 m (5' 5.35\")   Wt 113.4 kg (250 lb)   BMI 41.16 kg/m    BMI= Body mass index is 41.16 kg/m .  Wt Readings from Last 3 Encounters:   08/31/23 113.4 kg (250 lb)   07/12/23 111.6 kg (246 lb)   04/08/22 111.1 kg (245 lb)       General Appearance:   no distress, normal body habitus   ENT/Mouth: membranes moist, no oral lesions or bleeding gums.      EYES:  no scleral icterus, normal conjunctivae   Neck: no carotid bruits or thyromegaly   Chest/Lungs:   lungs are clear to auscultation, no rales or wheezing, no sternal scar, equal chest wall expansion    Cardiovascular:   Regular. Normal first and second heart sounds with no murmurs, rubs, or gallops; the carotid, radial and posterior tibial pulses are intact, no edema bilaterally    Abdomen:  no organomegaly, masses, bruits, or tenderness; bowel sounds are present   Extremities: no cyanosis or clubbing   Skin: no xanthelasma, warm.    Neurologic: normal  bilateral, no tremors     Psychiatric: alert and oriented x3, calm        Please refer above for cardiac ROS details.        Medical History  Surgical History Family History Social History   Past Medical History:   Diagnosis Date    Chronic pain     upper back  pain    CTS (carpal tunnel syndrome)     bilateral    Dislocation of right patella 01/2022    seeing [place], also noted osteonecrosis of that knee    Hyperlipidemia     Miscarriage     x2, one D&C, one medication, no complications    Neck pain 10/01/2020    herniated disc " C6-C7, treating at [place], had injection, not real helpful    Normal delivery     x4    Palpitations     Shoulder joint pain     right- past hx.    Vitamin D deficiency      Past Surgical History:   Procedure Laterality Date    ADRENALECTOMY Right     CORTISOL-SECRETING ADENOMA, S/P HEMORRHAGE.     SECTION  2007    first child    DILATION AND CURETTAGE  2006    HC REVISE MEDIAN N/CARPAL TUNNEL SURG Right 2018    Procedure: RIGHT CARPAL TUNNEL;  Surgeon: Do Hennessy MD;  Location: [facility] OR;  Service: Neurosurgery     Family History   Problem Relation Age of Onset    Recurrent abdominal pain Mother         removed a bunch of intestine     Hypertension Father     Heart Disease Father 45        MI, a fib, cardiac ablation, ICD    Heart Disease Paternal Grandfather          at age 49 of MI    Thyroid Cancer No family hx of         Social History     Socioeconomic History    Marital status:      Spouse name: Macario    Number of children: 5    Years of education: Not on file    Highest education level: Not on file   Occupational History    Occupation: RN labor and delivery     Comment: [employer]   Tobacco Use    Smoking status: Never    Smokeless tobacco: Never    Tobacco comments:     never smoked   Vaping Use    Vaping Use: Never used   Substance and Sexual Activity    Alcohol use: Yes     Alcohol/week: 5.0 standard drinks of alcohol     Types: 5 Standard drinks or equivalent per week     Comment: Alcoholic Drinks/day: occas.    Drug use: No    Sexual activity: Yes     Partners: Male     Birth control/protection: Surgical     Comment: vasectomy   Other Topics Concern    Parent/sibling w/ CABG, MI or angioplasty before 65F 55M? Not Asked   Social History Narrative    Not on file     Social Determinants of Health     Financial Resource Strain: Not on file   Food Insecurity: Not on file   Transportation Needs: Not on file   Physical Activity: Not on file   Stress: Not on file   Social  Connections: Not on file   Intimate Partner Violence: Not on file   Housing Stability: Not on file           Medications  Allergies   Current Outpatient Medications   Medication Sig Dispense Refill    Bacillus Coagulans-Inulin (PROBIOTIC) 1-250 BILLION-MG CAPS       FLUoxetine (PROZAC) 10 MG capsule TAKE 1 CAPSULE BY MOUTH EVERY DAY 30 capsule 8    levonorgestrel (MIRENA, 52 MG,) 20 MCG/24HR IUD Mirena 20 mcg/24 hours (7 yrs) 52 mg intrauterine device   Take 1 device by intrauterine route.      lisinopril (ZESTRIL) 10 MG tablet TAKE 1 TABLET (10 MG) BY MOUTH DAILY. 90 tablet 2    metoprolol succinate ER (TOPROL XL) 25 MG 24 hr tablet Take 1 tablet (25 mg) by mouth daily 90 tablet 1    omeprazole (PRILOSEC OTC) 20 MG EC tablet Take 20 mg by mouth daily         Allergies   Allergen Reactions    Morphine Hives          Lab Results    Chemistry/lipid CBC Cardiac Enzymes/BNP/TSH/INR   Recent Labs   Lab Test 03/23/22  1008   CHOL 193   HDL 62      TRIG 48     Recent Labs   Lab Test 03/23/22  1008 10/14/20  0916 01/16/19  0934    129 111     Recent Labs   Lab Test 07/13/23  0831      POTASSIUM 4.0   CHLORIDE 107   CO2 20*   *   BUN 9.2   CR 0.67   GFRESTIMATED >90   ROXANN 9.1     Recent Labs   Lab Test 07/13/23  0831 05/19/22  0955 03/23/22  1008   CR 0.67 0.6 0.73     Recent Labs   Lab Test 07/13/23  0831 01/16/19  0934 03/30/17  0815   A1C 5.4 5.1 5.4          Recent Labs   Lab Test 09/22/22  1422   WBC 10.3   HGB 13.0   HCT 40.5   MCV 89        Recent Labs   Lab Test 09/22/22  1422 05/11/22  1055 04/06/22  1057   HGB 13.0 10.3* 8.9*    No results for input(s): TROPONINI in the last 77859 hours.  No results for input(s): BNP, NTBNPI, NTBNP in the last 31799 hours.  Recent Labs   Lab Test 07/13/23  0831   TSH 2.22     No results for input(s): INR in the last 04325 hours.     Yoav Espinal MD

## 2023-08-31 NOTE — LETTER
8/31/2023    Alicia Kenney MD  No address on file    RE: Lia Lemon       Dear Colleague,     I had the pleasure of seeing Lia Lemon in the [facility].    HEART CARE ENCOUNTER CONSULTATON NOTE      [facility]  [phone]      Assessment/Recommendations   Assessment/Plan:    Lia Lemon is a very pleasant 43 year old female with PMH of PVCs, hypertension, herniated disc  who presents today to the EP clinic.    Premature ventricular contractions - symptomatic with PVCs.  Unifocal of LBIA morphology.  26% burden by Holter ambulatory rhythm monitoring.  No ECG evidence of arrhythmogenic substrate.  Normal LV systolic function, no heart failure symptoms.    We reviewed PVCs and management options including expectant management, medical therapy including antiarrhythmic drug therapy, and PVC mapping and ablation.  We reviewed PVC mapping and ablation procedures, risks (including groin bleeding, stroke, tamponade, heart block) and recovery expectations.  She would prefer to continue metoprolol at this time.  -She will think about her ablation and get back to us.      Time spent: 45 minutes spent on the date of the encounter doing chart review, history and exam, documentation and further activities as noted above.         History of Present Illness/Subjective    HPI: Lia Lemon is a very pleasant 43 year old female with PMH of PVCs, hypertension  who presents today to the EP clinic.    A recent Holter monitor showed a PVC burden of 26%. She has the perception of PVCs with palpitations. She has had them for a few months now. They do affect the quality of her life.  Metoprolol helps to a certain extent with her symptoms.  Triggers have been stress at work and also her neck pain-with more PVCs occurring on days when her neck pain is worse.      Recent ECG(personally reviewed):   NSR with PVC left bundle inferior axis likely outflow tract origin    Recent  "Echocardiogram Results (personally reviewed):  August 2023  Interpretation Summary     1. Normal left ventricular size and systolic performance with a visually  estimated ejection fraction of 55-60%.  2. No significant valvular heart disease is identified on this study.  3. Normal right ventricular size and systolic performance.     When compared to the prior real-time echocardiogram dated 14 April 2022, the  findings are felt to be fairly similar on both examinations.      Labs below reviewed personally     Physical Examination  Review of Systems   Vitals: /82 (BP Location: Left arm, Patient Position: Sitting, Cuff Size: Adult Large)   Pulse 78   Resp 16   Ht 1.66 m (5' 5.35\")   Wt 113.4 kg (250 lb)   BMI 41.16 kg/m    BMI= Body mass index is 41.16 kg/m .  Wt Readings from Last 3 Encounters:   08/31/23 113.4 kg (250 lb)   07/12/23 111.6 kg (246 lb)   04/08/22 111.1 kg (245 lb)       General Appearance:   no distress, normal body habitus   ENT/Mouth: membranes moist, no oral lesions or bleeding gums.      EYES:  no scleral icterus, normal conjunctivae   Neck: no carotid bruits or thyromegaly   Chest/Lungs:   lungs are clear to auscultation, no rales or wheezing, no sternal scar, equal chest wall expansion    Cardiovascular:   Regular. Normal first and second heart sounds with no murmurs, rubs, or gallops; the carotid, radial and posterior tibial pulses are intact, no edema bilaterally    Abdomen:  no organomegaly, masses, bruits, or tenderness; bowel sounds are present   Extremities: no cyanosis or clubbing   Skin: no xanthelasma, warm.    Neurologic: normal  bilateral, no tremors     Psychiatric: alert and oriented x3, calm        Please refer above for cardiac ROS details.        Medical History  Surgical History Family History Social History   Past Medical History:   Diagnosis Date    Chronic pain     upper back  pain    CTS (carpal tunnel syndrome)     bilateral    Dislocation of right patella " 2022    seeing [place], also noted osteonecrosis of that knee    Hyperlipidemia     Miscarriage     x2, one D&C, one medication, no complications    Neck pain 10/01/2020    herniated disc C6-C7, treating at [place], had injection, not real helpful    Normal delivery     x4    Palpitations     Shoulder joint pain     right- past hx.    Vitamin D deficiency      Past Surgical History:   Procedure Laterality Date    ADRENALECTOMY Right     CORTISOL-SECRETING ADENOMA, S/P HEMORRHAGE.     SECTION      first child    DILATION AND CURETTAGE      HC REVISE MEDIAN N/CARPAL TUNNEL SURG Right 2018    Procedure: RIGHT CARPAL TUNNEL;  Surgeon: Do Hennessy MD;  Location: [facility] OR;  Service: Neurosurgery     Family History   Problem Relation Age of Onset    Recurrent abdominal pain Mother         removed a bunch of intestine     Hypertension Father     Heart Disease Father 45        MI, a fib, cardiac ablation, ICD    Heart Disease Paternal Grandfather          at age 49 of MI    Thyroid Cancer No family hx of         Social History     Socioeconomic History    Marital status:      Spouse name: Macario    Number of children: 5    Years of education: Not on file    Highest education level: Not on file   Occupational History    Occupation: RN labor and delivery     Comment: [employer]   Tobacco Use    Smoking status: Never    Smokeless tobacco: Never    Tobacco comments:     never smoked   Vaping Use    Vaping Use: Never used   Substance and Sexual Activity    Alcohol use: Yes     Alcohol/week: 5.0 standard drinks of alcohol     Types: 5 Standard drinks or equivalent per week     Comment: Alcoholic Drinks/day: occas.    Drug use: No    Sexual activity: Yes     Partners: Male     Birth control/protection: Surgical     Comment: vasectomy   Other Topics Concern    Parent/sibling w/ CABG, MI or angioplasty before 65F 55M? Not Asked   Social History Narrative    Not on file     Social  Determinants of Health     Financial Resource Strain: Not on file   Food Insecurity: Not on file   Transportation Needs: Not on file   Physical Activity: Not on file   Stress: Not on file   Social Connections: Not on file   Intimate Partner Violence: Not on file   Housing Stability: Not on file           Medications  Allergies   Current Outpatient Medications   Medication Sig Dispense Refill    Bacillus Coagulans-Inulin (PROBIOTIC) 1-250 BILLION-MG CAPS       FLUoxetine (PROZAC) 10 MG capsule TAKE 1 CAPSULE BY MOUTH EVERY DAY 30 capsule 8    levonorgestrel (MIRENA, 52 MG,) 20 MCG/24HR IUD Mirena 20 mcg/24 hours (7 yrs) 52 mg intrauterine device   Take 1 device by intrauterine route.      lisinopril (ZESTRIL) 10 MG tablet TAKE 1 TABLET (10 MG) BY MOUTH DAILY. 90 tablet 2    metoprolol succinate ER (TOPROL XL) 25 MG 24 hr tablet Take 1 tablet (25 mg) by mouth daily 90 tablet 1    omeprazole (PRILOSEC OTC) 20 MG EC tablet Take 20 mg by mouth daily         Allergies   Allergen Reactions    Morphine Hives          Lab Results    Chemistry/lipid CBC Cardiac Enzymes/BNP/TSH/INR   Recent Labs   Lab Test 03/23/22  1008   CHOL 193   HDL 62      TRIG 48     Recent Labs   Lab Test 03/23/22  1008 10/14/20  0916 01/16/19  0934    129 111     Recent Labs   Lab Test 07/13/23  0831      POTASSIUM 4.0   CHLORIDE 107   CO2 20*   *   BUN 9.2   CR 0.67   GFRESTIMATED >90   ROXANN 9.1     Recent Labs   Lab Test 07/13/23  0831 05/19/22  0955 03/23/22  1008   CR 0.67 0.6 0.73     Recent Labs   Lab Test 07/13/23  0831 01/16/19  0934 03/30/17  0815   A1C 5.4 5.1 5.4          Recent Labs   Lab Test 09/22/22  1422   WBC 10.3   HGB 13.0   HCT 40.5   MCV 89        Recent Labs   Lab Test 09/22/22  1422 05/11/22  1055 04/06/22  1057   HGB 13.0 10.3* 8.9*    No results for input(s): TROPONINI in the last 21367 hours.  No results for input(s): BNP, NTBNPI, NTBNP in the last 82111 hours.  Recent Labs   Lab Test  07/13/23  0831   TSH 2.22     No results for input(s): INR in the last 99738 hours.     Yoav Espinal MD        Thank you for allowing me to participate in the care of your patient.      Sincerely,     Yoav Espinal MD     [facility]  cc:   No referring provider defined for this encounter.

## 2023-09-22 ENCOUNTER — OFFICE VISIT (OUTPATIENT)
Dept: FAMILY MEDICINE | Facility: CLINIC | Age: 43
End: 2023-09-22
Payer: COMMERCIAL

## 2023-09-22 VITALS
DIASTOLIC BLOOD PRESSURE: 80 MMHG | RESPIRATION RATE: 20 BRPM | TEMPERATURE: 99.8 F | BODY MASS INDEX: 44.16 KG/M2 | HEIGHT: 63 IN | OXYGEN SATURATION: 96 % | WEIGHT: 249.2 LBS | HEART RATE: 89 BPM | SYSTOLIC BLOOD PRESSURE: 128 MMHG

## 2023-09-22 DIAGNOSIS — E66.01 CLASS 3 SEVERE OBESITY DUE TO EXCESS CALORIES WITH SERIOUS COMORBIDITY AND BODY MASS INDEX (BMI) OF 40.0 TO 44.9 IN ADULT (H): ICD-10-CM

## 2023-09-22 DIAGNOSIS — E78.2 MIXED HYPERLIPIDEMIA: ICD-10-CM

## 2023-09-22 DIAGNOSIS — R00.2 PALPITATIONS: ICD-10-CM

## 2023-09-22 DIAGNOSIS — N95.1 MENOPAUSAL SYMPTOMS: ICD-10-CM

## 2023-09-22 DIAGNOSIS — E66.813 CLASS 3 SEVERE OBESITY DUE TO EXCESS CALORIES WITH SERIOUS COMORBIDITY AND BODY MASS INDEX (BMI) OF 40.0 TO 44.9 IN ADULT (H): ICD-10-CM

## 2023-09-22 DIAGNOSIS — I10 ESSENTIAL HYPERTENSION: ICD-10-CM

## 2023-09-22 DIAGNOSIS — E89.6 HISTORY OF PARTIAL ADRENALECTOMY (H): ICD-10-CM

## 2023-09-22 DIAGNOSIS — Z00.00 ROUTINE GENERAL MEDICAL EXAMINATION AT A HEALTH CARE FACILITY: Primary | ICD-10-CM

## 2023-09-22 PROCEDURE — 99213 OFFICE O/P EST LOW 20 MIN: CPT | Mod: 25 | Performed by: NURSE PRACTITIONER

## 2023-09-22 PROCEDURE — 99396 PREV VISIT EST AGE 40-64: CPT | Performed by: NURSE PRACTITIONER

## 2023-09-22 RX ORDER — LISINOPRIL 10 MG/1
10 TABLET ORAL DAILY
Qty: 90 TABLET | Refills: 2 | Status: CANCELLED | OUTPATIENT
Start: 2023-09-22

## 2023-09-22 RX ORDER — FLUOXETINE 10 MG/1
10 CAPSULE ORAL DAILY
Qty: 90 CAPSULE | Refills: 1 | Status: SHIPPED | OUTPATIENT
Start: 2023-09-22 | End: 2024-03-01

## 2023-09-22 ASSESSMENT — ENCOUNTER SYMPTOMS
HEMATOCHEZIA: 0
DYSURIA: 0
ARTHRALGIAS: 0
EYE PAIN: 0
DIARRHEA: 0
ABDOMINAL PAIN: 0
MYALGIAS: 0
SORE THROAT: 0
COUGH: 0
HEADACHES: 0
CONSTIPATION: 0
FEVER: 0
PARESTHESIAS: 0
DIZZINESS: 0
HEMATURIA: 0
HEARTBURN: 0
WEAKNESS: 0
NAUSEA: 0
JOINT SWELLING: 0
NERVOUS/ANXIOUS: 0
PALPITATIONS: 0
SHORTNESS OF BREATH: 0
FREQUENCY: 0
CHILLS: 0
BREAST MASS: 0

## 2023-09-22 NOTE — PROGRESS NOTES
SUBJECTIVE:   CC: Lia is an 43 year old who presents for preventive health visit.       9/22/2023     2:20 PM   Additional Questions   Roomed by [id number]   Accompanied by lima         9/22/2023     2:20 PM   Patient Reported Additional Medications   Patient reports taking the following new medications lima       HPI    Secondary to  heavy menstrual bleeding - Mirena - march 2022 - light spotting - [facility]     [facility] - has appointment for PAP     Seasonal affective/menopausal symptoms - Prozac 10mg.     GERD - patient diagnoses - takes Omeprazole OTC taking daily.     BMI 44- 2018 - on phentermine - loses and maintains for a year and a half and gains the weight back. Had 5th baby at the time.   Goal: 195-205 lbs -feeling motivated towards weight loss    Today's PHQ-2 Score:       9/22/2023    10:20 AM   PHQ-2 ( 1999 [employer])   Q1: Little interest or pleasure in doing things 1   Q2: Feeling down, depressed or hopeless 1   PHQ-2 Score 2   Q1: Little interest or pleasure in doing things Several days   Q2: Feeling down, depressed or hopeless Several days   PHQ-2 Score 2       Social History     Tobacco Use    Smoking status: Never    Smokeless tobacco: Never    Tobacco comments:     never smoked   Substance Use Topics    Alcohol use: Yes     Alcohol/week: 5.0 standard drinks of alcohol     Types: 5 Standard drinks or equivalent per week     Comment: Alcoholic Drinks/day: occas.             9/22/2023    10:20 AM   Alcohol Use   Prescreen: >3 drinks/day or >7 drinks/week? No     Reviewed orders with patient.  Reviewed health maintenance and updated orders accordingly - Yes      Breast Cancer Screening:        3/23/2022     9:12 AM 12/12/2022     3:35 PM   Breast CA Risk Assessment (FHS-7)   Do you have a family history of breast, colon, or ovarian cancer? No / Unknown No / Unknown         Mammogram Screening - Offered annual screening and updated Health Maintenance for mutual plan based on risk factor consideration  Pertinent  "mammograms are reviewed under the imaging tab.    History of abnormal Pap smear: NO - age 30-65 PAP every 5 years with negative HPV co-testing recommended      1/18/2019    12:00 AM   PAP / HPV   HPV_EXT - HISTORICAL See Scanned Report      Reviewed and updated as needed this visit by clinical staff   Tobacco  Allergies  Meds  Problems  Med Hx  Surg Hx  Fam Hx          Reviewed and updated as needed this visit by Provider   Tobacco  Allergies  Meds  Problems  Med Hx  Surg Hx  Fam Hx             Review of Systems  CONSTITUTIONAL: NEGATIVE for fever, chills, change in weight  INTEGUMENTARU/SKIN: NEGATIVE for worrisome rashes, moles or lesions  EYES: NEGATIVE for vision changes or irritation  ENT: NEGATIVE for ear, mouth and throat problems  RESP: NEGATIVE for significant cough or SOB  BREAST: NEGATIVE for masses, tenderness or discharge  CV: NEGATIVE for chest pain, palpitations or peripheral edema  GI: NEGATIVE for nausea, abdominal pain, heartburn, or change in bowel habits  : NEGATIVE for unusual urinary or vaginal symptoms. Periods are regular.  MUSCULOSKELETAL: NEGATIVE for significant arthralgias or myalgia  NEURO: NEGATIVE for weakness, dizziness or paresthesias  PSYCHIATRIC: NEGATIVE for changes in mood or affect     OBJECTIVE:   /80 (BP Location: Right arm, Patient Position: Sitting, Cuff Size: Adult Regular)   Pulse 89   Temp 99.8  F (37.7  C) (Oral)   Resp 20   Ht 1.6 m (5' 3\")   Wt 113 kg (249 lb 3.2 oz)   LMP  (LMP Unknown)   SpO2 96%   BMI 44.14 kg/m    Physical Exam  GENERAL: healthy, alert and no distress  EYES: Eyes grossly normal to inspection, PERRL and conjunctivae and sclerae normal  HENT: ear canals and TM's normal, nose and mouth without ulcers or lesions  NECK: no adenopathy, no asymmetry, masses, or scars and thyroid normal to palpation  RESP: lungs clear to auscultation - no rales, rhonchi or wheezes  BREAST: normal without masses, tenderness or nipple discharge " and no palpable axillary masses or adenopathy  CV: regular rate and rhythm, normal S1 S2, no S3 or S4, no murmur, click or rub, no peripheral edema and peripheral pulses strong  ABDOMEN: soft, nontender, no hepatosplenomegaly, no masses and bowel sounds normal  MS: no gross musculoskeletal defects noted, no edema  SKIN: no suspicious lesions or rashes  NEURO: Normal strength and tone, mentation intact and speech normal  PSYCH: mentation appears normal, affect normal/bright        ASSESSMENT/PLAN:   Lia was seen today for physical.    Diagnoses and all orders for this visit:    Routine general medical examination at a health care facility  We discussed healthy lifestyle, nutrition, cardiovascular risk reduction, self care, safety, sunscreen, and timing of cancer screening.  Health maintenance screening and immunizations reviewed with the patient.  Follow up yearly for the annual physical.      Menopausal symptoms  -     FLUoxetine (PROZAC) 10 MG capsule; Take 1 capsule (10 mg) by mouth daily  Stable on fluoxetine.  Refilling for 1 year.  Has moderate depression in the winter    Essential hypertension  -     Basic metabolic panel  (Ca, Cl, CO2, Creat, Gluc, K, Na, BUN); Future  -     CBC with platelets; Future  Discussed with patient to hold lisinopril between now and next visit.  Second blood pressure was higher than the first today.  Patient to check home blood pressure.  If blood pressure is still elevated with only metoprolol on its own I would suggest adding a ARB medication     Palpitations  continue follow-up with interventional cardiology   Class 3 severe obesity due to excess calories with serious comorbidity and body mass index (BMI) of 40.0 to 44.9 in adult (H)  discussed options for weight management.  Patient opts to discuss this further and consider medications.  Patient to check in with insurance to see if GLP-1 inhibitor is covered.  Follow-up appointment scheduled with me on Wednesday\\    History  "of partial adrenalectomy (H)  Follow-up with endocrinology.  Endocrinology Labs reviewed from earlier this summer.  Patient to do 24-hour urine       Mixed hyperlipidemia  -     Lipid Profile (Chol, Trig, HDL, LDL calc); Future  -     Basic metabolic panel  (Ca, Cl, CO2, Creat, Gluc, K, Na, BUN); Future  -     CBC with platelets; Future  going to return another day for fasting labs  Patient will get flu vaccine through work  Other orders  -     REVIEW OF HEALTH MAINTENANCE PROTOCOL ORDERS  -        Hold lisinopril today - pt with chronic dry cough.   In 2 weeks send me a [employer] message with your blood pressure readings if it's over 130/80 I would suggest adding losartan 25mg.           COUNSELING:  Reviewed preventive health counseling, as reflected in patient instructions      BMI:   Estimated body mass index is 44.14 kg/m  as calculated from the following:    Height as of this encounter: 1.6 m (5' 3\").    Weight as of this encounter: 113 kg (249 lb 3.2 oz).         She reports that she has never smoked. She has never used smokeless tobacco.          MILAN Suazo CNP  [facility]      "

## 2023-09-27 ENCOUNTER — OFFICE VISIT (OUTPATIENT)
Dept: FAMILY MEDICINE | Facility: CLINIC | Age: 43
End: 2023-09-27
Payer: COMMERCIAL

## 2023-09-27 ENCOUNTER — APPOINTMENT (OUTPATIENT)
Dept: LAB | Facility: CLINIC | Age: 43
End: 2023-09-27
Payer: COMMERCIAL

## 2023-09-27 VITALS
BODY MASS INDEX: 44.38 KG/M2 | TEMPERATURE: 98.4 F | DIASTOLIC BLOOD PRESSURE: 87 MMHG | HEART RATE: 79 BPM | SYSTOLIC BLOOD PRESSURE: 130 MMHG | HEIGHT: 63 IN | OXYGEN SATURATION: 99 % | RESPIRATION RATE: 16 BRPM | WEIGHT: 250.5 LBS

## 2023-09-27 DIAGNOSIS — E66.813 CLASS 3 SEVERE OBESITY DUE TO EXCESS CALORIES WITH SERIOUS COMORBIDITY AND BODY MASS INDEX (BMI) OF 40.0 TO 44.9 IN ADULT (H): ICD-10-CM

## 2023-09-27 DIAGNOSIS — I10 ESSENTIAL HYPERTENSION: ICD-10-CM

## 2023-09-27 DIAGNOSIS — R00.2 PALPITATIONS: ICD-10-CM

## 2023-09-27 DIAGNOSIS — E66.01 CLASS 3 SEVERE OBESITY DUE TO EXCESS CALORIES WITH SERIOUS COMORBIDITY AND BODY MASS INDEX (BMI) OF 40.0 TO 44.9 IN ADULT (H): ICD-10-CM

## 2023-09-27 DIAGNOSIS — Z76.89 ENCOUNTER FOR WEIGHT MANAGEMENT: Primary | ICD-10-CM

## 2023-09-27 PROCEDURE — 85027 COMPLETE CBC AUTOMATED: CPT | Performed by: NURSE PRACTITIONER

## 2023-09-27 PROCEDURE — 99213 OFFICE O/P EST LOW 20 MIN: CPT | Performed by: NURSE PRACTITIONER

## 2023-09-27 PROCEDURE — 36415 COLL VENOUS BLD VENIPUNCTURE: CPT | Performed by: NURSE PRACTITIONER

## 2023-09-27 PROCEDURE — 80048 BASIC METABOLIC PNL TOTAL CA: CPT | Performed by: NURSE PRACTITIONER

## 2023-09-27 PROCEDURE — 80061 LIPID PANEL: CPT | Performed by: NURSE PRACTITIONER

## 2023-09-27 RX ORDER — PHENTERMINE HYDROCHLORIDE 15 MG/1
15 CAPSULE ORAL EVERY MORNING
Qty: 30 CAPSULE | Refills: 1 | Status: SHIPPED | OUTPATIENT
Start: 2023-09-27 | End: 2023-10-30

## 2023-09-27 RX ORDER — PHENTERMINE HYDROCHLORIDE 37.5 MG/1
37.5 TABLET ORAL
Qty: 30 TABLET | Refills: 3 | Status: SHIPPED | OUTPATIENT
Start: 2023-10-25 | End: 2023-10-26 | Stop reason: DRUGHIGH

## 2023-09-27 NOTE — PATIENT INSTRUCTIONS
"You presented today for weight management discussion.  Plan is as follows:  Cardio exercise 5 days a week for 30 minutes at a time  Balanced diet with controlled portion sizes and minimal snacking between meals  8-hour window of eating with a 16-hour fasting.  Prescription sent for phentermine 15 mg for 30 days and 37.5 mg to be picked up in 30 days.  Patient to notify provider if any palpitations at all or increase in blood pressure.  Home blood pressure should be checked daily and should remain below 130/85  Prescription sent for Wegovy 0.25 mg weekly.  I would suggest starting this medication if it is covered by insurance.  I prefer this medication over phentermine.  Call the provider if this is covered and you pick it up from the pharmacy.  Do not  phentermine if you  Wegovy.  Continue follow-up with cardiology for palpitations.  Please keep in mind that phentermine can increase your risk of palpitations and should be discontinued immediately if palpitations worsen  Please let me know if you have any questions or concerns.    Thank you for trusting us with your care. It was a pleasure seeing you.  Esperanza García, MILAN CNP on 9/27/2023 at 1:49 PM          Losing 100 Pounds with Corinne - podcast   Have you ever thought, \"I know what to do I just don't know WHY I don't do it?\"  [email or web address].zerobound/us/podcast/[email or web address]-[email or web address]      Dr. Anne-Marie Palm's book for everyone:  \"How to Loose Weight for the Last Time: Brain Based Solutions for Permanent Weight Loss\"   Also her Podcast: Weight loss for Busy Physicians *   *just insert your own career/barriers with any reference to busy physician world and you will still get phenomenal tips for the mindset changes needed for sustained weight loss.        Weight Loss Strategies for Success: (start small, pick 1 or 2 goals each week)    Stop snacking. Three meals a day only. This is THE MAJOR  for insulin resistance. We need " "to change the insulin resistance to have sustained weight loss.   Keep a food log- apps like [employer] are very helpful for this. (Not to pay attention to the calories but just to track the food).   Plan your food log in advance- Night prior: pick out what you want to eat for the next day for your 3 meals and stick to it: protein, healthy fat, veggie for example at every meal.  High protein (60g/day): Try to get protein in at least every 3.5 hours during the day to avoid a hunger surge late in the day.  If hungry, start with a protein serving, followed by water and then a crunchy vegetable that requires chewing (this decreases the hunger hormone).   Use plenty of healthy fats (olive oil, avocados, nuts) when cooking which will make you feel more satisfied.    Be intentional and think about what you are eating and feel the food fuel you.  Have a  \"Table, chair, plate\" with every meal. Sit down to eat your food!  Brush your teeth after the last meal of the day. Prevents evening snacking.   Avoid sugary beverages. (pop, fancy coffees). Reduce alcohol.   Drink water instead.   No fast food (or reduce meals out to a specific #/week). Eat at home 90% of the time.  Start the morning with breakfast.  Choose one day to be \"meal planning\" and/or \"meal prep\" days. Plan ahead for grocery shopping for healthy food choices, and spend 30min-1hr each week prepping your fruits/veggies (wash, chop, store in easy grab portions).  Make it easy to grab the protein (cut up chicken breasts, greek yogurt containers, hard boiled eggs, etc)  Start low intensity exercise 30 minutes/day (walking/hiking/yard work).  Goal for 10,000 steps per day (consider a FitBit or other tracking device).  Daily weights can help- but just use it as a data point; don't get stuck in the thoughts around that number.         "

## 2023-09-27 NOTE — PROGRESS NOTES
"  Assessment & Plan         Encounter for weight management  Here for weight management discussion   - Semaglutide-Weight Management (WEGOVY) 0.25 MG/0.5ML pen  Dispense: 2 mL; Refill: 0    Class 3 severe obesity due to excess calories with serious comorbidity and body mass index (BMI) of 40.0 to 44.9 in adult (H)  Previously tolerated phentermine - discussed risks and SE of medication - could have increase palpitations with this medication - therefore will have order placed for karsten to see if covered by insurance. - would prefer that medication if the case    Patient goals: hold lisinopril given normal BP at home, slight elevation in clinic if likely due to white oat syndrome  Exercise 4-5 times/week 20-30 of cardio (walk the dog), podcasts?  High protein and low carb, intermittent fasting 8-16 time schedule    - phentermine (ADIPEX-P) 15 MG capsule  Dispense: 30 capsule; Refill: 1  - phentermine (ADIPEX-P) 37.5 MG tablet  Dispense: 30 tablet; Refill: 3  - Semaglutide-Weight Management (WEGOVY) 0.25 MG/0.5ML pen  Dispense: 2 mL; Refill: 0    Palpitations  Follow up with cards  Call clinic if palpitations occur - will discharge or give very low dose of phentermine    Essential hypertension  Hold lisinopril - continue metoprolol  Monitor home BP especially with phentermine   If over 130/85 call clinic    Follow up in 3-4 months - call clinic if wegovee covered and wanting to take this.              BMI:   Estimated body mass index is 44.37 kg/m  as calculated from the following:    Height as of this encounter: 1.6 m (5' 3\").    Weight as of this encounter: 113.6 kg (250 lb 8 oz).           MILAN Suazo CNP  [facility]   Lia is a 43 year old, presenting for the following health issues:  Weight Problem        9/27/2023     1:01 PM   Additional Questions   Roomed by ROXANNE Sneed   Accompanied by MARLI       History of Present Illness       Reason for visit:  Weight " "management    She eats 2-3 servings of fruits and vegetables daily.She consumes 0 sweetened beverage(s) daily.She exercises with enough effort to increase her heart rate 20 to 29 minutes per day.  She exercises with enough effort to increase her heart rate 3 or less days per week. She is missing 1 dose(s) of medications per week.   [facility] - has appointment for PAP     GERD - patient diagnoses - takes Omeprazole OTC taking daily.     BMI 44- 2018 - on phentermine - loses and maintains for a year and a half and gains the weight back. Had 5th baby at the time.   Goal: 195-205 lbs -feeling motivated towards weight loss, \"feeling healthy\"    Previously on phentermine got down to 172-175, 2017 started and on this for a little over a year      Wt Readings from Last 5 Encounters:   09/27/23 113.6 kg (250 lb 8 oz)   09/22/23 113 kg (249 lb 3.2 oz)   08/31/23 113.4 kg (250 lb)   07/12/23 111.6 kg (246 lb)   04/08/22 111.1 kg (245 lb)     HTN: 110-129, and 70-84 diastolic at home-   Palpitations previously - followed by cardiology - has appointment with Dr Oneil next month - possible ablation to assist with PVCs - on metoprolol     discussed options for weight management.             Review of Systems   Constitutional, HEENT, cardiovascular, pulmonary, gi and gu systems are negative, except as otherwise noted.      Objective    /87 (BP Location: Right arm)   Pulse 79   Temp 98.4  F (36.9  C) (Oral)   Resp 16   Ht 1.6 m (5' 3\")   Wt 113.6 kg (250 lb 8 oz)   LMP  (LMP Unknown)   SpO2 99%   BMI 44.37 kg/m    Body mass index is 44.37 kg/m .  Physical Exam   GENERAL: healthy, alert and no distress  CV: regular rate and rhythm, normal S1 S2, no S3 or S4, no murmur, click or rub, no peripheral edema and peripheral pulses strong  ABDOMEN: adiposity                              "

## 2023-09-28 ENCOUNTER — TELEPHONE (OUTPATIENT)
Dept: FAMILY MEDICINE | Facility: CLINIC | Age: 43
End: 2023-09-28
Payer: COMMERCIAL

## 2023-09-29 NOTE — TELEPHONE ENCOUNTER
Prior Authorization Approval    Medication: SEMAGLUTIDE-WEIGHT MANAGEMENT 0.25 MG/0.5ML SC SOAJ  Authorization Effective Date: 9/29/2023  Authorization Expiration Date: 4/19/2024  Approved Dose/Quantity:   Reference #:     Insurance Company: [employer] - Phone [phone] Fax [phone]  Expected CoPay: $    CoPay Card Available:      Financial Assistance Needed:   Which Pharmacy is filling the prescription: [facility] [place] [address]  Pharmacy Notified: Yes  Patient Notified: Yes

## 2023-09-29 NOTE — TELEPHONE ENCOUNTER
PA Initiation    Medication: SEMAGLUTIDE-WEIGHT MANAGEMENT 0.25 MG/0.5ML SC SOAJ  Insurance Company: [employer] - Phone [phone] Fax [phone]  Pharmacy Filling the Rx: [facility], [place] [address]  Filling Pharmacy Phone: [phone]  Filling Pharmacy Fax:    Start Date: 9/29/2023

## 2023-10-26 ENCOUNTER — OFFICE VISIT (OUTPATIENT)
Dept: CARDIOLOGY | Facility: CLINIC | Age: 43
End: 2023-10-26
Payer: COMMERCIAL

## 2023-10-26 ENCOUNTER — MYC MEDICAL ADVICE (OUTPATIENT)
Dept: FAMILY MEDICINE | Facility: CLINIC | Age: 43
End: 2023-10-26

## 2023-10-26 VITALS
WEIGHT: 240.1 LBS | BODY MASS INDEX: 42.53 KG/M2 | SYSTOLIC BLOOD PRESSURE: 118 MMHG | DIASTOLIC BLOOD PRESSURE: 64 MMHG | OXYGEN SATURATION: 100 % | RESPIRATION RATE: 20 BRPM | HEART RATE: 74 BPM

## 2023-10-26 DIAGNOSIS — R00.2 PALPITATIONS: ICD-10-CM

## 2023-10-26 DIAGNOSIS — E66.813 CLASS 3 SEVERE OBESITY DUE TO EXCESS CALORIES WITH SERIOUS COMORBIDITY AND BODY MASS INDEX (BMI) OF 40.0 TO 44.9 IN ADULT (H): ICD-10-CM

## 2023-10-26 DIAGNOSIS — I49.3 PVC'S (PREMATURE VENTRICULAR CONTRACTIONS): Primary | ICD-10-CM

## 2023-10-26 DIAGNOSIS — E66.01 CLASS 3 SEVERE OBESITY DUE TO EXCESS CALORIES WITH SERIOUS COMORBIDITY AND BODY MASS INDEX (BMI) OF 40.0 TO 44.9 IN ADULT (H): ICD-10-CM

## 2023-10-26 LAB
ATRIAL RATE - MUSE: 94 BPM
DIASTOLIC BLOOD PRESSURE - MUSE: NORMAL MMHG
INTERPRETATION ECG - MUSE: NORMAL
P AXIS - MUSE: 31 DEGREES
PR INTERVAL - MUSE: 148 MS
QRS DURATION - MUSE: 80 MS
QT - MUSE: 398 MS
QTC - MUSE: 497 MS
R AXIS - MUSE: 11 DEGREES
SYSTOLIC BLOOD PRESSURE - MUSE: NORMAL MMHG
T AXIS - MUSE: 30 DEGREES
VENTRICULAR RATE- MUSE: 94 BPM

## 2023-10-26 PROCEDURE — 93000 ELECTROCARDIOGRAM COMPLETE: CPT | Performed by: INTERNAL MEDICINE

## 2023-10-26 PROCEDURE — 99214 OFFICE O/P EST MOD 30 MIN: CPT | Performed by: INTERNAL MEDICINE

## 2023-10-26 RX ORDER — PHENTERMINE HYDROCHLORIDE 37.5 MG/1
37.5 TABLET ORAL
Qty: 30 TABLET | Refills: 1 | Status: CANCELLED | OUTPATIENT
Start: 2023-10-26

## 2023-10-26 NOTE — PATIENT INSTRUCTIONS
Please call my nurse Dominique with any heart related questions.Please increase the metoprolol to 37.5mg each day or 1 and 1/2 pills and update me with how the PVCs are doing.Will plan a holter in follow up pending our follow up discussion.

## 2023-10-26 NOTE — LETTER
10/26/2023    Esperanza Talaveraevans, APRN CNP  [address] [address]    RE: Lia Lemon       Dear Colleague,     I had the pleasure of seeing Lia Lemon in the [facility].    HEART CARE ENCOUNTER CONSULTATON NOTE      [facility]  [phone]      Assessment/Recommendations   Assessment/Plan:  1.  Premature ventricular complexes.  Palpitations associated with PVCs with a Holter monitor showing 26% burden of PVCs with the report from my EP colleagues reported unifocal of L AJ morphology.  Discussion and recommendations from my EP colleague August 31, 2023 reviewed and discussed.  She reports that the PVCs are improved but still present some days more prominent than others.  No associated dizziness, syncope or near syncope.  We talked about next steps.  We are going to increase the metoprolol to 37-1/2 mg daily and she will update me with how she has done with this change and then we can make additional discussion plans pending her return note.  2.  Hypertension.  Blood pressure appears to be under reasonable control.  She will monitor her blood pressure at home and update me with some blood pressure readings.  Today's blood pressure is 118/64.  3.  Dyslipidemia.  Normal coronary arteries based on CT angiogram from May 2022.  Cholesterol panel from September 27, 2023 finds a cholesterol 212, triglycerides of 63, LDL of 128.  She will continue with regular diet and exercise.  She has been utilizing phentermine.    Plan follow-up in 3 to 4 months.  2.  Increase metoprolol to 37-1/2 mg daily  3.  We will plan to follow-up Holter monitor       History of Present Illness/Subjective    HPI: Lia Lemon is a 43 year old female who is seen in follow-up.  We visited April 2022 in the office.  She has a history of hypertension, hyperlipidemia and family history of premature heart disease with symptoms of dyspnea on exertion.  CT angiogram completed May 2022 reported  normal coronary arteries without evidence of atherosclerosis.  More recently she contacted me with symptoms of palpitations.  Echocardiogram completed 2023 reported normal systolic function, no significant valve abnormality.  Holter monitor completed 2023 reported high burden of monomorphic appearing premature ventricular complexes a total of 26%.  She was seen in consultation by Dr. Espinal 2023.  Notes are reviewed.  Discussion regarding PVC mapping and ablation procedure with plans for her to think about the options further and to continue with the metoprolol.  Notes from her primary care provider are reviewed from .  Lisinopril was placed on hold.      She reports that the palpitations are improved but are still present some days more prominent than others.  She has no specific complaints of chest discomfort, shortness of breath, syncope or near syncope.  She is tolerating the metoprolol.  We did talk about sleep apnea and although she says she snores  her  does not report that she snores loudly and does not have periods of apnea that she is aware of.  She was going to investigate this further and we talked about whether evaluation for sleep apnea would be beneficial.        Recent Echocardiogram Results:  : 1980  Study Date: 2023 01:21 PM  Age: 43 yrs  Gender: Female  Patient Location: [facility]  Reason For Study: Palpitations  Ordering Physician: NARDA ACEVEDO  Referring Physician: NARDA ACEVEDO  Performed By: CM     BSA: 2.2 m2  Height: 65 in  Weight: 246 lb  HR: 89  BP: 115/56 mmHg  ______________________________________________________________________________  Procedure  Complete Echo Adult. Definity (NDC #85643-592) given intravenously.  ______________________________________________________________________________  Interpretation Summary     1. Normal left ventricular size and systolic performance with a visually  estimated ejection  fraction of 55-60%.  2. No significant valvular heart disease is identified on this study.  3. Normal right ventricular size and systolic performance.     When compared to the prior real-time echocardiogram dated 14 April 2022, the  findings are felt to be fairly similar on both examinations.  ______________________________________________________________________________  Left ventricle:  Normal left ventricular size and systolic performance with a visually  estimated ejection fraction of 55-60%. There is normal regional wall motion.  Left ventricular wall thickness is normal.     Assessment of LV Diastolic Function: The cumulative findings suggest normal  diastolic filling [The septal e' velocity is > 7 cm/s & lateral e' velocity  is  < 10 cm/s. The average E/e' is < 14. The TR velocity cannot be determined due  to insufficient tricuspid insufficiency signal. Left atrial volume index is  less than 34 mL/mÂ ].     Right ventricle:  Normal right ventricular size and systolic performance.     Left atrium:  The left atrium is of normal size.     Right atrium:  The right atrium is of normal size.     IVC:  The IVC is of normal caliber.     Aortic valve:  The aortic valve is not well visualized, but suspected to be comprised of  three cusps. No significant aortic stenosis or aortic insufficiency is  detected on this study.     Mitral valve:  The mitral valve appears morphologically normal. There is trace mitral  insufficiency.     Tricuspid valve:  The tricuspid valve is grossly morphologically normal. There is trace  tricuspid insufficiency.     Pulmonic valve:  The pulmonic valve is grossly morphologically normal.     Thoracic aorta:  The aortic root and proximal ascending aorta are of normal dimension.     Pericardium:  There is no significant pericardial effusion.  ______________________________________________________________________________      Recent Coronary Angiogram Results:    CTA Angiogram coronary  artery    Height:  Not recorded   Weight:  Not recorded   Blood Pressure:  116/72    Date of Study:  22   Ordering Provider:  Elias Oneil MD   Clinical Indications:  Dyspnea on exertion [R06.00 (ICD-10-CM)]       Interpreting Physicians  Performing Staff   Chester Muniz MD Tech:  Marco Desir ARRT        Patient Information    Patient Name   Lia Lemon MRN   [id number] Legal Sex   Female              Age   1980 (43 year old)     Reason for Exam  Priority: Routine  Dx: Dyspnea on exertion [R06.09 (ICD-10-CM)]     Indications    Dyspnea on exertion [R06.00 (ICD-10-CM)]     Interpretation Summary    Normal coronary arteries with no evidence of atherosclerosis.          Physical Examination  Review of Systems   Vitals: 118/64, pulse 74, respiratory rate 20  Wt Readings from Last 3 Encounters:   23 113.6 kg (250 lb 8 oz)   23 113 kg (249 lb 3.2 oz)   23 113.4 kg (250 lb)       General Appearance:   no distress, normal body habitus   ENT/Mouth: Wearing a facemask   EYES:  no scleral icterus, normal conjunctivae   Neck: no carotid bruits or thyromegaly   Chest/Lungs:   lungs are clear to auscultation, no rales or wheezing,  equal chest wall expansion    Cardiovascular:   Regular. Normal first and second heart sounds with no murmurs, rubs, or gallops; the carotid, radial and posterior tibial pulses are intact, Jugular venous pressure within normal limits, no significant edema bilaterally    Abdomen:  ; bowel sounds are present   Extremities: no cyanosis or clubbing   Skin: no xanthelasma, warm.    Neurologic:  no tremors     Psychiatric: alert and oriented x3, calm        Please refer above for cardiac ROS details.        Medical History  Surgical History Family History Social History   Past Medical History:   Diagnosis Date    Chronic pain     upper back  pain    CTS (carpal tunnel syndrome)     bilateral    Dislocation of right patella 2022    seeing [facility], also noted  osteonecrosis of that knee    Hyperlipidemia     Miscarriage     x2, one D&C, one medication, no complications    Neck pain 10/01/2020    herniated disc C6-C7, treating at [facility], had injection, not real helpful    Normal delivery     x4    Palpitations     Shoulder joint pain     right- past hx.    Vitamin D deficiency      Past Surgical History:   Procedure Laterality Date    ADRENALECTOMY Right     CORTISOL-SECRETING ADENOMA, S/P HEMORRHAGE.     SECTION      first child    DILATION AND CURETTAGE  2006    HC REVISE MEDIAN N/CARPAL TUNNEL SURG Right 2018    Procedure: RIGHT CARPAL TUNNEL;  Surgeon: Do Hennessy MD;  Location: [facility];  Service: Neurosurgery     Family History   Problem Relation Age of Onset    Recurrent abdominal pain Mother         removed a bunch of intestine     Substance Abuse Mother     Hypertension Father     Heart Disease Father 45        MI, a fib, cardiac ablation, ICD    Diabetes Father     Coronary Artery Disease Father     Hyperlipidemia Father     Depression Father     Anxiety Disorder Father     Other Cancer Maternal Grandmother 64        unknown location    Heart Disease Paternal Grandfather          at age 49 of MI    Thyroid Cancer No family hx of         Social History     Socioeconomic History    Marital status:      Spouse name: Macario    Number of children: 5    Years of education: Not on file    Highest education level: Not on file   Occupational History    Occupation: RN labor and delivery     Comment: [employer]   Tobacco Use    Smoking status: Never    Smokeless tobacco: Never    Tobacco comments:     never smoked   Vaping Use    Vaping Use: Never used   Substance and Sexual Activity    Alcohol use: Yes     Alcohol/week: 5.0 standard drinks of alcohol     Types: 5 Standard drinks or equivalent per week     Comment: Alcoholic Drinks/day: occas.    Drug use: No    Sexual activity: Yes     Partners: Male     Birth control/protection:  Surgical     Comment: vasectomy   Other Topics Concern    Parent/sibling w/ CABG, MI or angioplasty before 65F 55M? Not Asked   Social History Narrative    Not on file     Social Determinants of Health     Financial Resource Strain: Low Risk  (9/22/2023)    Financial Resource Strain     Within the past 12 months, have you or your family members you live with been unable to get utilities (heat, electricity) when it was really needed?: No   Food Insecurity: Low Risk  (9/22/2023)    Food Insecurity     Within the past 12 months, did you worry that your food would run out before you got money to buy more?: No     Within the past 12 months, did the food you bought just not last and you didn t have money to get more?: No   Transportation Needs: Low Risk  (9/22/2023)    Transportation Needs     Within the past 12 months, has lack of transportation kept you from medical appointments, getting your medicines, non-medical meetings or appointments, work, or from getting things that you need?: No   Physical Activity: Not on file   Stress: Not on file   Social Connections: Not on file   Interpersonal Safety: Low Risk  (9/22/2023)    Interpersonal Safety     Do you feel physically and emotionally safe where you currently live?: Yes     Within the past 12 months, have you been hit, slapped, kicked or otherwise physically hurt by someone?: No     Within the past 12 months, have you been humiliated or emotionally abused in other ways by your partner or ex-partner?: No   Housing Stability: Low Risk  (9/22/2023)    Housing Stability     Do you have housing? : Yes     Are you worried about losing your housing?: No           Medications  Allergies   Current Outpatient Medications   Medication Sig Dispense Refill    Bacillus Coagulans-Inulin (PROBIOTIC) 1-250 BILLION-MG CAPS       FLUoxetine (PROZAC) 10 MG capsule Take 1 capsule (10 mg) by mouth daily 90 capsule 1    levonorgestrel (MIRENA, 52 MG,) 20 MCG/24HR IUD Mirena 20 mcg/24 hours  "(7 yrs) 52 mg intrauterine device   Take 1 device by intrauterine route.      metoprolol succinate ER (TOPROL XL) 25 MG 24 hr tablet Take 1 tablet (25 mg) by mouth daily 90 tablet 1    phentermine (ADIPEX-P) 15 MG capsule Take 1 capsule (15 mg) by mouth every morning 30 capsule 1    phentermine (ADIPEX-P) 37.5 MG tablet Take 1 tablet (37.5 mg) by mouth every morning (before breakfast) 30 tablet 3       Allergies   Allergen Reactions    Morphine Hives          Lab Results    Chemistry/lipid CBC Cardiac Enzymes/BNP/TSH/INR   Recent Labs   Lab Test 09/27/23  0839   CHOL 212*   HDL 71   *   TRIG 63     Recent Labs   Lab Test 09/27/23  0839 03/23/22  1008 10/14/20  0916   * 121 129     Recent Labs   Lab Test 09/27/23  0839      POTASSIUM 4.3   CHLORIDE 105   CO2 23   *   BUN 8.0   CR 0.70   GFRESTIMATED >90   ROXANN 9.1     Recent Labs   Lab Test 09/27/23  0839 07/13/23  0831 05/19/22  0955   CR 0.70 0.67 0.6     Recent Labs   Lab Test 07/13/23  0831 01/16/19  0934 03/30/17  0815   A1C 5.4 5.1 5.4          Recent Labs   Lab Test 09/27/23  0839   WBC 5.9   HGB 14.6   HCT 43.9   MCV 98        Recent Labs   Lab Test 09/27/23  0839 09/22/22  1422 05/11/22  1055   HGB 14.6 13.0 10.3*    No results for input(s): \"TROPONINI\" in the last 15284 hours.  No results for input(s): \"BNP\", \"NTBNPI\", \"NTBNP\" in the last 57717 hours.  Recent Labs   Lab Test 07/13/23  0831   TSH 2.22     No results for input(s): \"INR\" in the last 79677 hours.     Elias Oneil MD    Thank you for allowing me to participate in the care of your patient.      Sincerely,   Elias Oneil MD   [facility]  "

## 2023-10-26 NOTE — PROGRESS NOTES
HEART CARE ENCOUNTER CONSULTATON NOTE      [facility]  [phone]      Assessment/Recommendations   Assessment/Plan:  1.  Premature ventricular complexes.  Palpitations associated with PVCs with a Holter monitor showing 26% burden of PVCs with the report from my EP colleagues reported unifocal of L AJ morphology.  Discussion and recommendations from my EP colleague August 31, 2023 reviewed and discussed.  She reports that the PVCs are improved but still present some days more prominent than others.  No associated dizziness, syncope or near syncope.  We talked about next steps.  We are going to increase the metoprolol to 37-1/2 mg daily and she will update me with how she has done with this change and then we can make additional discussion plans pending her return note.  2.  Hypertension.  Blood pressure appears to be under reasonable control.  She will monitor her blood pressure at home and update me with some blood pressure readings.  Today's blood pressure is 118/64.  3.  Dyslipidemia.  Normal coronary arteries based on CT angiogram from May 2022.  Cholesterol panel from September 27, 2023 finds a cholesterol 212, triglycerides of 63, LDL of 128.  She will continue with regular diet and exercise.  She has been utilizing phentermine.    Plan follow-up in 3 to 4 months.  2.  Increase metoprolol to 37-1/2 mg daily  3.  We will plan to follow-up Holter monitor    ECG today demonstrates sinus rhythm with bigeminal PVCs.       History of Present Illness/Subjective    HPI: Lia Lemon is a 43 year old female who is seen in follow-up.  We visited April 2022 in the office.  She has a history of hypertension, hyperlipidemia and family history of premature heart disease with symptoms of dyspnea on exertion.  CT angiogram completed May 2022 reported normal coronary arteries without evidence of atherosclerosis.  More recently she contacted me with symptoms of palpitations.  Echocardiogram completed  2023 reported normal systolic function, no significant valve abnormality.  Holter monitor completed 2023 reported high burden of monomorphic appearing premature ventricular complexes a total of 26%.  She was seen in consultation by Dr. Espinal 2023.  Notes are reviewed.  Discussion regarding PVC mapping and ablation procedure with plans for her to think about the options further and to continue with the metoprolol.  Notes from her primary care provider are reviewed from .  Lisinopril was placed on hold.      She reports that the palpitations are improved but are still present some days more prominent than others.  She has no specific complaints of chest discomfort, shortness of breath, syncope or near syncope.  She is tolerating the metoprolol.  We did talk about sleep apnea and although she says she snores  her  does not report that she snores loudly and does not have periods of apnea that she is aware of.  She was going to investigate this further and we talked about whether evaluation for sleep apnea would be beneficial.        Recent Echocardiogram Results:  : 1980  Study Date: 2023 01:21 PM  Age: 43 yrs  Gender: Female  Patient Location: [facility]  Reason For Study: Palpitations  Ordering Physician: NARDA ACEVEDO  Referring Physician: NARDA ACEVEDO  Performed By: CM     BSA: 2.2 m2  Height: 65 in  Weight: 246 lb  HR: 89  BP: 115/56 mmHg  ______________________________________________________________________________  Procedure  Complete Echo Adult. Definity (NDC #42115-673) given intravenously.  ______________________________________________________________________________  Interpretation Summary     1. Normal left ventricular size and systolic performance with a visually  estimated ejection fraction of 55-60%.  2. No significant valvular heart disease is identified on this study.  3. Normal right ventricular size and systolic performance.      When compared to the prior real-time echocardiogram dated 14 April 2022, the  findings are felt to be fairly similar on both examinations.  ______________________________________________________________________________  Left ventricle:  Normal left ventricular size and systolic performance with a visually  estimated ejection fraction of 55-60%. There is normal regional wall motion.  Left ventricular wall thickness is normal.     Assessment of LV Diastolic Function: The cumulative findings suggest normal  diastolic filling [The septal e' velocity is > 7 cm/s & lateral e' velocity  is  < 10 cm/s. The average E/e' is < 14. The TR velocity cannot be determined due  to insufficient tricuspid insufficiency signal. Left atrial volume index is  less than 34 mL/mÂ ].     Right ventricle:  Normal right ventricular size and systolic performance.     Left atrium:  The left atrium is of normal size.     Right atrium:  The right atrium is of normal size.     IVC:  The IVC is of normal caliber.     Aortic valve:  The aortic valve is not well visualized, but suspected to be comprised of  three cusps. No significant aortic stenosis or aortic insufficiency is  detected on this study.     Mitral valve:  The mitral valve appears morphologically normal. There is trace mitral  insufficiency.     Tricuspid valve:  The tricuspid valve is grossly morphologically normal. There is trace  tricuspid insufficiency.     Pulmonic valve:  The pulmonic valve is grossly morphologically normal.     Thoracic aorta:  The aortic root and proximal ascending aorta are of normal dimension.     Pericardium:  There is no significant pericardial effusion.  ______________________________________________________________________________      Recent Coronary Angiogram Results:    CTA Angiogram coronary artery    Height:  Not recorded   Weight:  Not recorded   Blood Pressure:  116/72    Date of Study:  5/19/22   Ordering Provider:  Elias Oneil MD   Clinical  Indications:  Dyspnea on exertion [R06.00 (ICD-10-CM)]       Interpreting Physicians  Performing Staff   Chester Muniz MD Tech:  Marco Desir ARRT        Patient Information    Patient Name   Lia Lemon MRN   [id number] Legal Sex   Female              Age   1980 (43 year old)     Reason for Exam  Priority: Routine  Dx: Dyspnea on exertion [R06.09 (ICD-10-CM)]     Indications    Dyspnea on exertion [R06.00 (ICD-10-CM)]     Interpretation Summary    Normal coronary arteries with no evidence of atherosclerosis.          Physical Examination  Review of Systems   Vitals: 118/64, pulse 74, respiratory rate 20  Wt Readings from Last 3 Encounters:   23 113.6 kg (250 lb 8 oz)   23 113 kg (249 lb 3.2 oz)   23 113.4 kg (250 lb)       General Appearance:   no distress, normal body habitus   ENT/Mouth: Wearing a facemask   EYES:  no scleral icterus, normal conjunctivae   Neck: no carotid bruits or thyromegaly   Chest/Lungs:   lungs are clear to auscultation, no rales or wheezing,  equal chest wall expansion    Cardiovascular:   Regular. Normal first and second heart sounds with no murmurs, rubs, or gallops; the carotid, radial and posterior tibial pulses are intact, Jugular venous pressure within normal limits, no significant edema bilaterally    Abdomen:  ; bowel sounds are present   Extremities: no cyanosis or clubbing   Skin: no xanthelasma, warm.    Neurologic:  no tremors     Psychiatric: alert and oriented x3, calm        Please refer above for cardiac ROS details.        Medical History  Surgical History Family History Social History   Past Medical History:   Diagnosis Date    Chronic pain     upper back  pain    CTS (carpal tunnel syndrome)     bilateral    Dislocation of right patella 2022    seeing [place], also noted osteonecrosis of that knee    Hyperlipidemia     Miscarriage     x2, one D&C, one medication, no complications    Neck pain 10/01/2020    herniated disc C6-C7,  treating at [place], had injection, not real helpful    Normal delivery     x4    Palpitations     Shoulder joint pain     right- past hx.    Vitamin D deficiency      Past Surgical History:   Procedure Laterality Date    ADRENALECTOMY Right     CORTISOL-SECRETING ADENOMA, S/P HEMORRHAGE.     SECTION      first child    DILATION AND CURETTAGE  2006    HC REVISE MEDIAN N/CARPAL TUNNEL SURG Right 2018    Procedure: RIGHT CARPAL TUNNEL;  Surgeon: Do Hennessy MD;  Location: [facility] OR;  Service: Neurosurgery     Family History   Problem Relation Age of Onset    Recurrent abdominal pain Mother         removed a bunch of intestine     Substance Abuse Mother     Hypertension Father     Heart Disease Father 45        MI, a fib, cardiac ablation, ICD    Diabetes Father     Coronary Artery Disease Father     Hyperlipidemia Father     Depression Father     Anxiety Disorder Father     Other Cancer Maternal Grandmother 64        unknown location    Heart Disease Paternal Grandfather          at age 49 of MI    Thyroid Cancer No family hx of         Social History     Socioeconomic History    Marital status:      Spouse name: Macario    Number of children: 5    Years of education: Not on file    Highest education level: Not on file   Occupational History    Occupation: RN labor and delivery     Comment: [employer]   Tobacco Use    Smoking status: Never    Smokeless tobacco: Never    Tobacco comments:     never smoked   Vaping Use    Vaping Use: Never used   Substance and Sexual Activity    Alcohol use: Yes     Alcohol/week: 5.0 standard drinks of alcohol     Types: 5 Standard drinks or equivalent per week     Comment: Alcoholic Drinks/day: occas.    Drug use: No    Sexual activity: Yes     Partners: Male     Birth control/protection: Surgical     Comment: vasectomy   Other Topics Concern    Parent/sibling w/ CABG, MI or angioplasty before 65F 55M? Not Asked   Social History Narrative    Not  on file     Social Determinants of Health     Financial Resource Strain: Low Risk  (9/22/2023)    Financial Resource Strain     Within the past 12 months, have you or your family members you live with been unable to get utilities (heat, electricity) when it was really needed?: No   Food Insecurity: Low Risk  (9/22/2023)    Food Insecurity     Within the past 12 months, did you worry that your food would run out before you got money to buy more?: No     Within the past 12 months, did the food you bought just not last and you didn t have money to get more?: No   Transportation Needs: Low Risk  (9/22/2023)    Transportation Needs     Within the past 12 months, has lack of transportation kept you from medical appointments, getting your medicines, non-medical meetings or appointments, work, or from getting things that you need?: No   Physical Activity: Not on file   Stress: Not on file   Social Connections: Not on file   Interpersonal Safety: Low Risk  (9/22/2023)    Interpersonal Safety     Do you feel physically and emotionally safe where you currently live?: Yes     Within the past 12 months, have you been hit, slapped, kicked or otherwise physically hurt by someone?: No     Within the past 12 months, have you been humiliated or emotionally abused in other ways by your partner or ex-partner?: No   Housing Stability: Low Risk  (9/22/2023)    Housing Stability     Do you have housing? : Yes     Are you worried about losing your housing?: No           Medications  Allergies   Current Outpatient Medications   Medication Sig Dispense Refill    Bacillus Coagulans-Inulin (PROBIOTIC) 1-250 BILLION-MG CAPS       FLUoxetine (PROZAC) 10 MG capsule Take 1 capsule (10 mg) by mouth daily 90 capsule 1    levonorgestrel (MIRENA, 52 MG,) 20 MCG/24HR IUD Mirena 20 mcg/24 hours (7 yrs) 52 mg intrauterine device   Take 1 device by intrauterine route.      metoprolol succinate ER (TOPROL XL) 25 MG 24 hr tablet Take 1 tablet (25 mg) by  "mouth daily 90 tablet 1    phentermine (ADIPEX-P) 15 MG capsule Take 1 capsule (15 mg) by mouth every morning 30 capsule 1    phentermine (ADIPEX-P) 37.5 MG tablet Take 1 tablet (37.5 mg) by mouth every morning (before breakfast) 30 tablet 3       Allergies   Allergen Reactions    Morphine Hives          Lab Results    Chemistry/lipid CBC Cardiac Enzymes/BNP/TSH/INR   Recent Labs   Lab Test 09/27/23  0839   CHOL 212*   HDL 71   *   TRIG 63     Recent Labs   Lab Test 09/27/23  0839 03/23/22  1008 10/14/20  0916   * 121 129     Recent Labs   Lab Test 09/27/23  0839      POTASSIUM 4.3   CHLORIDE 105   CO2 23   *   BUN 8.0   CR 0.70   GFRESTIMATED >90   ROXANN 9.1     Recent Labs   Lab Test 09/27/23  0839 07/13/23  0831 05/19/22  0955   CR 0.70 0.67 0.6     Recent Labs   Lab Test 07/13/23  0831 01/16/19  0934 03/30/17  0815   A1C 5.4 5.1 5.4          Recent Labs   Lab Test 09/27/23  0839   WBC 5.9   HGB 14.6   HCT 43.9   MCV 98        Recent Labs   Lab Test 09/27/23  0839 09/22/22  1422 05/11/22  1055   HGB 14.6 13.0 10.3*    No results for input(s): \"TROPONINI\" in the last 43634 hours.  No results for input(s): \"BNP\", \"NTBNPI\", \"NTBNP\" in the last 98944 hours.  Recent Labs   Lab Test 07/13/23  0831   TSH 2.22     No results for input(s): \"INR\" in the last 00346 hours.     Elias Oneil MD                                    "

## 2023-10-26 NOTE — TELEPHONE ENCOUNTER
Routing to PCP to review and advise on increased dose request.    Shannan Harris, GLON, RN  [facility]

## 2023-10-27 ENCOUNTER — LAB REQUISITION (OUTPATIENT)
Dept: LAB | Facility: CLINIC | Age: 43
End: 2023-10-27
Payer: COMMERCIAL

## 2023-10-27 DIAGNOSIS — Z01.419 ENCOUNTER FOR GYNECOLOGICAL EXAMINATION (GENERAL) (ROUTINE) WITHOUT ABNORMAL FINDINGS: ICD-10-CM

## 2023-10-27 PROCEDURE — 87624 HPV HI-RISK TYP POOLED RSLT: CPT | Mod: ORL | Performed by: NURSE PRACTITIONER

## 2023-10-27 PROCEDURE — G0145 SCR C/V CYTO,THINLAYER,RESCR: HCPCS | Mod: ORL | Performed by: NURSE PRACTITIONER

## 2023-10-30 RX ORDER — PHENTERMINE HYDROCHLORIDE 15 MG/1
15 CAPSULE ORAL EVERY MORNING
Qty: 30 CAPSULE | Refills: 1 | Status: SHIPPED | OUTPATIENT
Start: 2023-10-30 | End: 2024-01-15

## 2023-10-30 NOTE — TELEPHONE ENCOUNTER
Santos Fitzgerald,     I would feel more comfortable with continuing the 15mg given that you have a 10 pound weight loss.  This is very rapid and honestly faster than I would like, but this can happen the first couple months. (1-2 pounds/week preferred).  Remember our goal is chronic stabilization beyond 1 year of treatment.  If we can stay low dose longer we can stretch the med out to 15 months. Additionally, we are not trying to get to the weight loss in a sprint. The more lifestyle changes you can improve on, the less you will depend on medication and less risk of gaining it back after medications is over.      Since you are seeing success with low dose, let's keep this going until your plateau.     Let me know if this sounds good.   I'll send the med for now.     MILAN Suazo CNP on 10/30/2023 at 12:14 AM

## 2023-11-01 LAB
BKR LAB AP GYN ADEQUACY: NORMAL
BKR LAB AP GYN INTERPRETATION: NORMAL
BKR LAB AP HPV REFLEX: NORMAL
BKR LAB AP LMP: NORMAL
BKR LAB AP PREVIOUS ABNL DX: NORMAL
BKR LAB AP PREVIOUS ABNORMAL: NORMAL
PATH REPORT.COMMENTS IMP SPEC: NORMAL
PATH REPORT.COMMENTS IMP SPEC: NORMAL
PATH REPORT.RELEVANT HX SPEC: NORMAL

## 2023-11-02 LAB
HUMAN PAPILLOMA VIRUS 16 DNA: NEGATIVE
HUMAN PAPILLOMA VIRUS 18 DNA: NEGATIVE
HUMAN PAPILLOMA VIRUS FINAL DIAGNOSIS: NORMAL
HUMAN PAPILLOMA VIRUS OTHER HR: NEGATIVE

## 2023-11-09 DIAGNOSIS — I49.3 PVC'S (PREMATURE VENTRICULAR CONTRACTIONS): ICD-10-CM

## 2023-11-09 DIAGNOSIS — R00.2 PALPITATIONS: ICD-10-CM

## 2023-11-09 RX ORDER — METOPROLOL SUCCINATE 25 MG/1
37.5 TABLET, EXTENDED RELEASE ORAL DAILY
Qty: 90 TABLET | Refills: 1 | Status: SHIPPED | OUTPATIENT
Start: 2023-11-09 | End: 2024-03-04

## 2023-11-09 NOTE — PROGRESS NOTES
Metoprolol succinate rx updated at [facility].  -ral    ----- Message -----  From: Elias Oneil MD  Sent: 11/8/2023   7:45 PM CST  To: Dominique Jain RN    Can we please send a script for metoprolol xl 37.5 mg daily  ty mdg

## 2023-11-14 ENCOUNTER — MYC MEDICAL ADVICE (OUTPATIENT)
Dept: ENDOCRINOLOGY | Facility: CLINIC | Age: 43
End: 2023-11-14
Payer: COMMERCIAL

## 2023-11-14 DIAGNOSIS — E66.813 CLASS 3 SEVERE OBESITY DUE TO EXCESS CALORIES WITH SERIOUS COMORBIDITY AND BODY MASS INDEX (BMI) OF 40.0 TO 44.9 IN ADULT (H): Primary | ICD-10-CM

## 2023-11-14 DIAGNOSIS — Z86.018 HISTORY OF ADENOMA OF ADRENAL GLAND: ICD-10-CM

## 2023-11-14 DIAGNOSIS — E66.01 CLASS 3 SEVERE OBESITY DUE TO EXCESS CALORIES WITH SERIOUS COMORBIDITY AND BODY MASS INDEX (BMI) OF 40.0 TO 44.9 IN ADULT (H): Primary | ICD-10-CM

## 2023-11-16 RX ORDER — DEXAMETHASONE 1 MG
TABLET ORAL
Qty: 1 TABLET | Refills: 0 | Status: SHIPPED | OUTPATIENT
Start: 2023-11-16 | End: 2024-03-01

## 2023-11-16 RX ORDER — DEXAMETHASONE 2 MG/1
2 TABLET ORAL ONCE
Qty: 1 TABLET | Refills: 0 | Status: CANCELLED | OUTPATIENT
Start: 2023-11-16 | End: 2023-11-16

## 2023-12-01 ENCOUNTER — LAB (OUTPATIENT)
Dept: LAB | Facility: CLINIC | Age: 43
End: 2023-12-01
Payer: COMMERCIAL

## 2023-12-01 DIAGNOSIS — Z86.018 HISTORY OF ADENOMA OF ADRENAL GLAND: ICD-10-CM

## 2023-12-01 LAB
CORTIS SERPL-MCNC: 0.9 UG/DL
Lab: NORMAL
PERFORMING LABORATORY: NORMAL
TEST NAME: NORMAL

## 2023-12-01 PROCEDURE — 36415 COLL VENOUS BLD VENIPUNCTURE: CPT

## 2023-12-01 PROCEDURE — 82533 TOTAL CORTISOL: CPT

## 2023-12-01 PROCEDURE — 80299 QUANTITATIVE ASSAY DRUG: CPT

## 2023-12-06 LAB — MISCELLANEOUS TEST 1 (ARUP): NORMAL

## 2023-12-12 ENCOUNTER — MYC MEDICAL ADVICE (OUTPATIENT)
Dept: ENDOCRINOLOGY | Facility: CLINIC | Age: 43
End: 2023-12-12
Payer: COMMERCIAL

## 2023-12-12 DIAGNOSIS — Z86.018 HISTORY OF ADENOMA OF ADRENAL GLAND: Primary | ICD-10-CM

## 2024-01-15 ENCOUNTER — MYC REFILL (OUTPATIENT)
Dept: FAMILY MEDICINE | Facility: CLINIC | Age: 44
End: 2024-01-15
Payer: COMMERCIAL

## 2024-01-15 DIAGNOSIS — E66.813 CLASS 3 SEVERE OBESITY DUE TO EXCESS CALORIES WITH SERIOUS COMORBIDITY AND BODY MASS INDEX (BMI) OF 40.0 TO 44.9 IN ADULT (H): ICD-10-CM

## 2024-01-15 DIAGNOSIS — E66.01 CLASS 3 SEVERE OBESITY DUE TO EXCESS CALORIES WITH SERIOUS COMORBIDITY AND BODY MASS INDEX (BMI) OF 40.0 TO 44.9 IN ADULT (H): ICD-10-CM

## 2024-01-16 RX ORDER — PHENTERMINE HYDROCHLORIDE 15 MG/1
15 CAPSULE ORAL EVERY MORNING
Qty: 30 CAPSULE | Refills: 1 | Status: SHIPPED | OUTPATIENT
Start: 2024-01-16 | End: 2024-03-01

## 2024-01-19 ENCOUNTER — HOSPITAL ENCOUNTER (OUTPATIENT)
Dept: MAMMOGRAPHY | Facility: CLINIC | Age: 44
Discharge: HOME | End: 2024-01-19
Attending: NURSE PRACTITIONER | Admitting: NURSE PRACTITIONER
Payer: COMMERCIAL

## 2024-01-19 DIAGNOSIS — Z12.31 VISIT FOR SCREENING MAMMOGRAM: ICD-10-CM

## 2024-01-19 PROCEDURE — 77067 SCR MAMMO BI INCL CAD: CPT

## 2024-03-01 ENCOUNTER — OFFICE VISIT (OUTPATIENT)
Dept: FAMILY MEDICINE | Facility: CLINIC | Age: 44
End: 2024-03-01
Payer: COMMERCIAL

## 2024-03-01 VITALS
BODY MASS INDEX: 38.5 KG/M2 | DIASTOLIC BLOOD PRESSURE: 82 MMHG | SYSTOLIC BLOOD PRESSURE: 112 MMHG | WEIGHT: 217.3 LBS | TEMPERATURE: 98.4 F | RESPIRATION RATE: 16 BRPM | HEART RATE: 71 BPM | OXYGEN SATURATION: 99 % | HEIGHT: 63 IN

## 2024-03-01 DIAGNOSIS — E66.01 CLASS 3 SEVERE OBESITY DUE TO EXCESS CALORIES WITH SERIOUS COMORBIDITY AND BODY MASS INDEX (BMI) OF 40.0 TO 44.9 IN ADULT (H): Primary | ICD-10-CM

## 2024-03-01 DIAGNOSIS — E66.813 CLASS 3 SEVERE OBESITY DUE TO EXCESS CALORIES WITH SERIOUS COMORBIDITY AND BODY MASS INDEX (BMI) OF 40.0 TO 44.9 IN ADULT (H): Primary | ICD-10-CM

## 2024-03-01 DIAGNOSIS — N95.1 MENOPAUSAL SYMPTOMS: ICD-10-CM

## 2024-03-01 PROCEDURE — 99213 OFFICE O/P EST LOW 20 MIN: CPT | Performed by: NURSE PRACTITIONER

## 2024-03-01 RX ORDER — TOPIRAMATE 25 MG/1
25 TABLET, FILM COATED ORAL DAILY
Qty: 90 TABLET | Refills: 0 | Status: SHIPPED | OUTPATIENT
Start: 2024-03-01

## 2024-03-01 RX ORDER — PHENTERMINE HYDROCHLORIDE 37.5 MG/1
37.5 TABLET ORAL
Qty: 30 TABLET | Refills: 3 | Status: SHIPPED | OUTPATIENT
Start: 2024-03-01 | End: 2024-07-09

## 2024-03-01 RX ORDER — FLUOXETINE 10 MG/1
10 CAPSULE ORAL DAILY
Qty: 90 CAPSULE | Refills: 3 | Status: SHIPPED | OUTPATIENT
Start: 2024-03-01

## 2024-03-01 NOTE — PATIENT INSTRUCTIONS
MEDICATIONS FOR WEIGHT LOSS    PHENTERMINE (Adipex): approved in 1959 for appetite suppression.  It has stimulant effects and cannot be used with Ritalin, Concerta, or other stimulants.  It is notaddictive although it's chemically related to amphetamines.  Amphetamines are addictive. The most common side effects are dry mouth, increased energy and concentration, increased pulse, and constipation.  You should not takephentermine if you have glaucoma, hyperthyroidism, or uncontrolled/untreated hypertension.  $24-$30 for 90 tablets    TOPIRAMATE (Topamax): Anti-seizure medication, also used to prevent migraines.  Side effects include paresthesia, glaucoma,altered concentration, attention difficulties, memory and speech problems, metabolic acidosis, depression, increase in body temperature and decrease sweating, kidney stones, and weight loss.  Do not take Topamax while takingDepakote as this can cause high ammonia levels.  You must have reliable birth control as Topamax can cause birth defects.  Discontinue slowly to avoid seizure.  Insurance usually covers Topiramate.    QSYMIA (Phentermine + Topamax):  See above information aboutphentermine and Topamax.  Most common side effects are paresthesia, dizziness, distortion of taste, insomnia, constipation, and dry mouth.  $150-$220 per month    Phentermine-topiramate -- In 2012, the [place] Food and Drug Administration (FDA) approved a preparation of phentermine and extended-release topiramate (in one capsule) for adults with a body mass index (BMI) ?30 kg/m2 or with a BMI ?27 kg/m2 with at least one weight-related comorbidity (eg, hypertension, diabetes, dyslipidemia) [64]. Phentermine-topiramate is not recommended for patients with known cardiovascular disease (hypertension or coronary heart disease), but it is an appropriate agent for individuals with obesity who do not have cardiovascular disease and for whom glucagon-like peptide 1 (GLP-1) therapy is not appropriate,  accessible, or tolerated. The efficacy and safety of combining generic phentermine with generic topiramate for weight loss (each taken individually) has not yet been established, although this is commonly done to reduce patients' out-of-pocket costs.  Efficacy -- This combination has been shown to enhance weight loss in the first year of use, as illustrated by the following trials:  ?A combination of controlled-release phentermine-topiramate (7.5/46 mg or 15/92 mg) was compared with placebo in 2487 adults with BMI of 27 to 45 kg/m2 and two or more comorbidities [65]. After one year, mean weight loss was greater in those assigned to active treatment (8 to 10 versus 1.4 kg with placebo [8 to 10 percent versus 1.2 percent of baseline body weight]). Only 61 percent of participants completed one year of treatment.  In a 52-week extension of the above trial (78 percent of eligible subjects participating), mean total weight loss (from baseline to 108 weeks) was significantly better than placebo (9.6, 10.9, and 2.1 kg [9.3, 10.5, and 1.8 percent of baseline body weight] for low dose, high dose, and placebo, respectively) [14]. Of note, phentermine-topiramate was less effective for weight loss in the second year of use, although most individuals were able to maintain the weight they lost in year 1. In those subjects who were able to participate in the second year of the trial, the therapy was well tolerated.  ?In another trial, patients with BMI ?35 kg/m2 were randomly assigned to controlled-release phentermine-topiramate (3.75/23 mg or 15/92 mg) or placebo [66]. After 56 weeks, mean weight loss was greater in the active treatment groups (mean reduction 6, 12.6, and 1.9 kg [5.1, 10.9, and 1.6 percent of baseline body weight]). Among those assigned to active treatment, 45 to 67 percent lost at least 5 percent of baseline weight compared with 17 percent of placebo patients.  Adverse effects -- The most common adverse events in  these trials were dry mouth (13 to 21 versus 2 percent), constipation (15 to 17 versus 6 percent), and paresthesia (14 to 21 versus 2 percent) [65,66]. There was a dose-related increase in the incidence of psychiatric (eg, depression, anxiety) and cognitive (eg, disturbance in attention) adverse events in the active treatment group. Although blood pressure improved slightly with active therapy, there was an increase in heart rate (0.6 to 1.6 beats/min) compared with placebo.  Dosing and contraindications -- The initial dose of phentermine-topiramate is 3.75/23 mg for 14 days, followed by 7.5/46 mg thereafter. If after 12 weeks a 3 percent loss in baseline body weight is not achieved, the dose can be increased to 11.25/69 mg for 14 days and then to 15/92 mg daily (table 2) [67]. If an individual does not lose 5 percent of body weight after 12 weeks on the highest dose, phentermine-topiramate should be discontinued gradually, tapering the dose over at least one week using every-other-day dosing, as abrupt withdrawal of topiramate can cause seizures [67]. (See 'Monitoring' above.)  Combination phentermine-topiramate is contraindicated during pregnancy because of an increased risk of orofacial clefts in infants exposed to the combination drug during the first trimester of pregnancy. Females of childbearing age should have a pregnancy test before starting this drug and monthly thereafter. It is also contraindicated in patients with hyperthyroidism or glaucoma and in patients who have taken monoamine oxidase inhibitors within 14 days. Because topiramate can produce renal stones, this combination preparation should be used cautiously in patients with a history of renal stones.  Clinicians who prescribe phentermine-topiramate are encouraged to enroll in a Risk Evaluation and Mitigation Strategy (REMS), which includes an online or print formal training module detailing safety information [68]. Pharmacies that dispense the  drug require certification, which involves identifying a representative to oversee the REMS program and providing patients with a medication guide and brochure, each time the drug is dispensed, detailing the risks of congenital anomalies.  Use of phentermine monotherapy is reviewed below. (See 'Sympathomimetic drugs' below.)

## 2024-03-01 NOTE — PROGRESS NOTES
"  Assessment & Plan     Encounter for weight management  Here for weight management discussion     Class 3 severe obesity due to excess calories with serious comorbidity and body mass index (BMI) of 40.0 to 44.9 in adult (H)  phentermine 15 mg started 9/27/2023 through 1/29/2024.  Wegovy medication was denied  In September 2023     patient did stop lisinopril after last September appointment.  Her blood pressure appears normal limits today.  We can continue to hold this medication and let do lifestyle changes     Current exercise 4-5 times/week 20-30 of cardio (walk the dog), podcasts?  High protein and low carb, intermittent fasting 8-16 time schedule    Increasing phentermine today 3/1/24 -   Also adding topiramate 25 mg daily.  Discussed that these medications together produce the most amount of weight loss.  Information given and patient information today about both of these medications and the side effects    Follow-up in 3 months    - phentermine (ADIPEX-P) 37.5 MG tablet  Dispense: 30 tablet; Refill: 3  -Topiramate 25 mg daily    Palpitations  Denies palpitations with low-dose phentermine    Essential hypertension  Continue to hold lisinopril - continue metoprolol  Monitor home BP especially with phentermine   If over 130/85 call clinic    Follow up in 3-4 months        BMI:   Estimated body mass index is 38.49 kg/m  as calculated from the following:    Height as of this encounter: 1.6 m (5' 3\").    Weight as of this encounter: 98.6 kg (217 lb 4.8 oz).           MILAN Suazo CNP  [facility]   Lia is a 43 year old, presenting for the following health issues:  Follow Up (Follow up on weight management no other concern.)        9/27/2023     1:01 PM   Additional Questions   Roomed by ROXANNE Sneed   Accompanied by MARLI       History of Present Illness       Reason for visit:  Weight management    She eats 2-3 servings of fruits and vegetables daily.She consumes " "0 sweetened beverage(s) daily.She exercises with enough effort to increase her heart rate 20 to 29 minutes per day.  She exercises with enough effort to increase her heart rate 3 or less days per week. She is missing 1 dose(s) of medications per week.   [facility] - has appointment for PAP     GERD - patient diagnoses - takes Omeprazole OTC taking daily.     BMI 44- 2018 - on phentermine - loses and maintains for a year and a half and gains the weight back. Had 5th baby at the time.   Goal: 195-205 lbs -feeling motivated towards weight loss, \"feeling healthy\"    Previously on phentermine got down to 172-175, 2017 started and on this for a little over a year      - 20 min walking     Wt Readings from Last 5 Encounters:   03/01/24 98.6 kg (217 lb 4.8 oz)   10/26/23 108.9 kg (240 lb 1.6 oz)   09/27/23 113.6 kg (250 lb 8 oz)   09/22/23 113 kg (249 lb 3.2 oz)   08/31/23 113.4 kg (250 lb)     HTN: 110-129, and 70-84 diastolic at home-   Palpitations previously - followed by cardiology - has appointment with Dr Oneil next month - possible ablation to assist with PVCs - on metoprolol     discussed options for weight management.             Review of Systems   Constitutional, HEENT, cardiovascular, pulmonary, gi and gu systems are negative, except as otherwise noted.      Objective    /82 (BP Location: Right arm, Patient Position: Sitting, Cuff Size: Adult Large)   Pulse 71   Temp 98.4  F (36.9  C) (Oral)   Resp 16   Ht 1.6 m (5' 3\")   Wt 98.6 kg (217 lb 4.8 oz)   SpO2 99%   BMI 38.49 kg/m    Body mass index is 38.49 kg/m .  Physical Exam   GENERAL: healthy, alert and no distress  CV: regular rate and rhythm, normal S1 S2, no S3 or S4, no murmur, click or rub, no peripheral edema and peripheral pulses strong  ABDOMEN: adiposity                              "

## 2024-03-01 NOTE — PROGRESS NOTES
"  {PROVIDER CHARTING PREFERENCE:770383}    Subjective   Lia is a 44 year old, presenting for the following health issues:  Follow Up (Follow up on weight management no other concern.)      3/1/2024     2:57 PM   Additional Questions   Roomed by LC-LPN   Accompanied by N/A     History of Present Illness       Reason for visit:  Weight Management    She eats 2-3 servings of fruits and vegetables daily.She consumes 0 sweetened beverage(s) daily.She exercises with enough effort to increase her heart rate 10 to 19 minutes per day.  She exercises with enough effort to increase her heart rate 4 days per week.   She is taking medications regularly.       {MA/LPN/RN Pre-Provider Visit Orders- hCG/UA/Strep (Optional):004737}  {SUPERLIST (Optional):134737}  {additonal problems for provider to add (Optional):083680}    {ROS Picklists (Optional):632480}      Objective    /82 (BP Location: Right arm, Patient Position: Sitting, Cuff Size: Adult Large)   Pulse 71   Temp 98.4  F (36.9  C) (Oral)   Resp 16   Ht 1.6 m (5' 3\")   Wt 98.6 kg (217 lb 4.8 oz)   SpO2 99%   BMI 38.49 kg/m    Body mass index is 38.49 kg/m .  Physical Exam   {Exam List (Optional):245726}    {Diagnostic Test Results (Optional):222836}        Signed Electronically by: MILAN Suazo CNP  {Email feedback regarding this note to [email or web address]   :500809}  "

## 2024-03-03 DIAGNOSIS — I49.3 PVC'S (PREMATURE VENTRICULAR CONTRACTIONS): ICD-10-CM

## 2024-03-03 DIAGNOSIS — R00.2 PALPITATIONS: ICD-10-CM

## 2024-03-04 RX ORDER — METOPROLOL SUCCINATE 25 MG/1
37.5 TABLET, EXTENDED RELEASE ORAL DAILY
Qty: 45 TABLET | Refills: 3 | Status: SHIPPED | OUTPATIENT
Start: 2024-03-04 | End: 2024-07-12

## 2024-03-28 ENCOUNTER — LAB REQUISITION (OUTPATIENT)
Dept: LAB | Facility: CLINIC | Age: 44
End: 2024-03-28
Payer: COMMERCIAL

## 2024-03-28 DIAGNOSIS — N39.46 MIXED INCONTINENCE: ICD-10-CM

## 2024-03-28 PROCEDURE — 87086 URINE CULTURE/COLONY COUNT: CPT | Mod: ORL | Performed by: STUDENT IN AN ORGANIZED HEALTH CARE EDUCATION/TRAINING PROGRAM

## 2024-03-30 LAB — BACTERIA UR CULT: NORMAL

## 2024-04-15 ENCOUNTER — OFFICE VISIT (OUTPATIENT)
Dept: FAMILY MEDICINE | Facility: CLINIC | Age: 44
End: 2024-04-15
Payer: COMMERCIAL

## 2024-04-15 VITALS
SYSTOLIC BLOOD PRESSURE: 154 MMHG | OXYGEN SATURATION: 100 % | HEART RATE: 70 BPM | WEIGHT: 211 LBS | BODY MASS INDEX: 37.38 KG/M2 | TEMPERATURE: 98.5 F | RESPIRATION RATE: 16 BRPM | DIASTOLIC BLOOD PRESSURE: 94 MMHG

## 2024-04-15 DIAGNOSIS — R07.0 THROAT PAIN: Primary | ICD-10-CM

## 2024-04-15 DIAGNOSIS — K12.0 APHTHOUS ULCER: ICD-10-CM

## 2024-04-15 LAB
DEPRECATED S PYO AG THROAT QL EIA: NEGATIVE
GROUP A STREP BY PCR: NOT DETECTED

## 2024-04-15 PROCEDURE — 99213 OFFICE O/P EST LOW 20 MIN: CPT | Performed by: PHYSICIAN ASSISTANT

## 2024-04-15 PROCEDURE — 87651 STREP A DNA AMP PROBE: CPT | Performed by: PHYSICIAN ASSISTANT

## 2024-04-15 NOTE — PROGRESS NOTES
Assessment & Plan     Throat pain    Aphatous ulcer tongue     Conservative measures discussed.   Push fluids, rest and ibuprofen or tylenol for comfort.    RTC for persistent or worsening sx.   May try MOM/benadryl lquid 1:1 for sx improvement. Offered magic mouthwash which was deferred.  RTC for persistent or worsening sx.     - Streptococcus A Rapid Screen w/Reflex to PCR - Clinic Collect  - Group A Streptococcus PCR Throat Swab             No follow-ups on file.    Clare Bashir PA-C  [facility] JOHNNY Fitzgerald is a 44 year old female who presents to clinic today for the following health issues:  Chief Complaint   Patient presents with    Throat Pain     X last couple of days, son dx with strep last week, blister on tongue and some pain with swallowing, no congestion now, no coughing, no fever     HPI Pt presents with several day hx of ST.    Strep exposure at home.      No uri sx currently,   Sore on the tongue.    No fevers.   Moderate fatigue  No uri sx including rhinorrhea, congestion, cough  .      Review of Systems  Constitutional, HEENT, cardiovascular, pulmonary, gi and gu systems are negative, except as otherwise noted.      Objective    BP (!) 154/94   Pulse 70   Temp 98.5  F (36.9  C) (Oral)   Resp 16   Wt 95.7 kg (211 lb)   SpO2 100%   BMI 37.38 kg/m    Physical Exam   Pt is in no acute distress and appears well  Ears patent B:  TM s intact, non-injected. All land marks easily visibile    Nasal mucosa is non-edematous, no discharge.    Pharynx: non erythematous, tonsils non hypertrophied, No exudate   Small shallow ulceration at the tip of the L tongue.    Neck supple: no adenopathy  Lungs: CTA  Heart: RRR, no murmur, no thrills or heaves   Ext: no edema  Skin: no rashes    Results for orders placed or performed in visit on 04/15/24   Streptococcus A Rapid Screen w/Reflex to PCR - Clinic Collect     Status: Normal    Specimen: Throat; Swab   Result Value Ref  Range    Group A Strep antigen Negative Negative   Group A Streptococcus PCR Throat Swab     Status: Normal    Specimen: Throat; Swab   Result Value Ref Range    Group A strep by PCR Not Detected Not Detected    Narrative    The Xpert Xpress Strep A test, performed on the [employer], is a rapid, qualitative in vitro diagnostic test for the detection of Streptococcus pyogenes (Group A ß-hemolytic Streptococcus, Strep A) in throat swab specimens from patients with signs and symptoms of pharyngitis. The Xpert Xpress Strep A test can be used as an aid in the diagnosis of Group A Streptococcal pharyngitis. The assay is not intended to monitor treatment for Group A Streptococcus infections. The Xpert Xpress Strep A test utilizes an automated real-time polymerase chain reaction (PCR) to detect Streptococcus pyogenes DNA.

## 2024-07-09 ENCOUNTER — MYC REFILL (OUTPATIENT)
Dept: FAMILY MEDICINE | Facility: CLINIC | Age: 44
End: 2024-07-09
Payer: COMMERCIAL

## 2024-07-09 DIAGNOSIS — E66.01 CLASS 3 SEVERE OBESITY DUE TO EXCESS CALORIES WITH SERIOUS COMORBIDITY AND BODY MASS INDEX (BMI) OF 40.0 TO 44.9 IN ADULT (H): ICD-10-CM

## 2024-07-09 DIAGNOSIS — E66.813 CLASS 3 SEVERE OBESITY DUE TO EXCESS CALORIES WITH SERIOUS COMORBIDITY AND BODY MASS INDEX (BMI) OF 40.0 TO 44.9 IN ADULT (H): ICD-10-CM

## 2024-07-11 DIAGNOSIS — I49.3 PVC'S (PREMATURE VENTRICULAR CONTRACTIONS): ICD-10-CM

## 2024-07-11 DIAGNOSIS — R00.2 PALPITATIONS: ICD-10-CM

## 2024-07-12 RX ORDER — METOPROLOL SUCCINATE 25 MG/1
37.5 TABLET, EXTENDED RELEASE ORAL DAILY
Qty: 45 TABLET | Refills: 0 | Status: SHIPPED | OUTPATIENT
Start: 2024-07-12 | End: 2024-08-05

## 2024-07-15 RX ORDER — PHENTERMINE HYDROCHLORIDE 37.5 MG/1
37.5 TABLET ORAL
Qty: 30 TABLET | Refills: 3 | Status: SHIPPED | OUTPATIENT
Start: 2024-07-15

## 2024-08-03 DIAGNOSIS — R00.2 PALPITATIONS: ICD-10-CM

## 2024-08-03 DIAGNOSIS — I49.3 PVC'S (PREMATURE VENTRICULAR CONTRACTIONS): ICD-10-CM

## 2024-08-05 RX ORDER — METOPROLOL SUCCINATE 25 MG/1
TABLET, EXTENDED RELEASE ORAL
Qty: 135 TABLET | Refills: 0 | Status: SHIPPED | OUTPATIENT
Start: 2024-08-05

## 2024-09-27 ENCOUNTER — PATIENT OUTREACH (OUTPATIENT)
Dept: CARE COORDINATION | Facility: CLINIC | Age: 44
End: 2024-09-27
Payer: COMMERCIAL

## 2024-10-11 ENCOUNTER — PATIENT OUTREACH (OUTPATIENT)
Dept: CARE COORDINATION | Facility: CLINIC | Age: 44
End: 2024-10-11

## 2024-10-22 DIAGNOSIS — I49.3 PVC'S (PREMATURE VENTRICULAR CONTRACTIONS): ICD-10-CM

## 2024-10-22 DIAGNOSIS — R00.2 PALPITATIONS: ICD-10-CM

## 2024-10-22 RX ORDER — METOPROLOL SUCCINATE 25 MG/1
TABLET, EXTENDED RELEASE ORAL
Qty: 135 TABLET | Refills: 0 | Status: SHIPPED | OUTPATIENT
Start: 2024-10-22

## 2024-12-05 ENCOUNTER — MYC REFILL (OUTPATIENT)
Dept: FAMILY MEDICINE | Facility: CLINIC | Age: 44
End: 2024-12-05
Payer: COMMERCIAL

## 2024-12-05 DIAGNOSIS — E66.01 CLASS 3 SEVERE OBESITY DUE TO EXCESS CALORIES WITH SERIOUS COMORBIDITY AND BODY MASS INDEX (BMI) OF 40.0 TO 44.9 IN ADULT (H): ICD-10-CM

## 2024-12-05 DIAGNOSIS — E66.813 CLASS 3 SEVERE OBESITY DUE TO EXCESS CALORIES WITH SERIOUS COMORBIDITY AND BODY MASS INDEX (BMI) OF 40.0 TO 44.9 IN ADULT (H): ICD-10-CM

## 2024-12-05 RX ORDER — PHENTERMINE HYDROCHLORIDE 37.5 MG/1
37.5 TABLET ORAL
Qty: 30 TABLET | Refills: 0 | Status: SHIPPED | OUTPATIENT
Start: 2024-12-05

## 2024-12-13 PROBLEM — Z82.49 FAMILY HISTORY OF MI (MYOCARDIAL INFARCTION): Status: ACTIVE | Noted: 2024-12-13

## 2024-12-16 ENCOUNTER — PATIENT OUTREACH (OUTPATIENT)
Dept: CARE COORDINATION | Facility: CLINIC | Age: 44
End: 2024-12-16
Payer: COMMERCIAL

## 2025-02-28 ENCOUNTER — ANCILLARY PROCEDURE (OUTPATIENT)
Dept: MAMMOGRAPHY | Facility: HOSPITAL | Age: 45
End: 2025-02-28
Attending: NURSE PRACTITIONER
Payer: COMMERCIAL

## 2025-02-28 DIAGNOSIS — Z12.31 ENCOUNTER FOR SCREENING MAMMOGRAM FOR BREAST CANCER: ICD-10-CM

## 2025-02-28 PROCEDURE — 77067 SCR MAMMO BI INCL CAD: CPT

## 2025-02-28 PROCEDURE — 77063 BREAST TOMOSYNTHESIS BI: CPT

## 2025-08-11 ENCOUNTER — MYC REFILL (OUTPATIENT)
Dept: FAMILY MEDICINE | Facility: CLINIC | Age: 45
End: 2025-08-11
Payer: COMMERCIAL

## 2025-08-11 DIAGNOSIS — E66.813 CLASS 3 SEVERE OBESITY DUE TO EXCESS CALORIES WITH SERIOUS COMORBIDITY AND BODY MASS INDEX (BMI) OF 40.0 TO 44.9 IN ADULT (H): ICD-10-CM

## 2025-08-11 RX ORDER — PHENTERMINE HYDROCHLORIDE 15 MG/1
15 CAPSULE ORAL EVERY MORNING
Qty: 60 CAPSULE | Refills: 0 | Status: CANCELLED | OUTPATIENT
Start: 2025-08-11

## 2025-08-12 ENCOUNTER — MYC MEDICAL ADVICE (OUTPATIENT)
Dept: FAMILY MEDICINE | Facility: CLINIC | Age: 45
End: 2025-08-12
Payer: COMMERCIAL

## 2025-08-14 ENCOUNTER — E-VISIT (OUTPATIENT)
Dept: FAMILY MEDICINE | Facility: CLINIC | Age: 45
End: 2025-08-14
Payer: COMMERCIAL

## 2025-08-14 DIAGNOSIS — E66.813 CLASS 3 SEVERE OBESITY DUE TO EXCESS CALORIES WITH SERIOUS COMORBIDITY AND BODY MASS INDEX (BMI) OF 40.0 TO 44.9 IN ADULT (H): ICD-10-CM

## 2025-08-14 DIAGNOSIS — F50.812 BINGE-EATING DISORDER, SEVERE: Primary | Chronic | ICD-10-CM

## 2025-08-18 RX ORDER — PHENTERMINE HYDROCHLORIDE 15 MG/1
15 CAPSULE ORAL EVERY MORNING
Qty: 30 CAPSULE | Refills: 1 | Status: SHIPPED | OUTPATIENT
Start: 2025-08-18